# Patient Record
Sex: FEMALE | Race: WHITE | NOT HISPANIC OR LATINO | Employment: OTHER | URBAN - METROPOLITAN AREA
[De-identification: names, ages, dates, MRNs, and addresses within clinical notes are randomized per-mention and may not be internally consistent; named-entity substitution may affect disease eponyms.]

---

## 2017-02-17 ENCOUNTER — HOSPITAL ENCOUNTER (OUTPATIENT)
Dept: OTHER | Facility: HOSPITAL | Age: 67
Discharge: HOME | End: 2017-02-17

## 2017-09-11 ENCOUNTER — OFFICE (OUTPATIENT)
Dept: URBAN - METROPOLITAN AREA OTHER 6 | Facility: OTHER | Age: 67
End: 2017-09-11

## 2017-09-11 VITALS
HEIGHT: 66 IN | WEIGHT: 151 LBS | HEART RATE: 84 BPM | DIASTOLIC BLOOD PRESSURE: 70 MMHG | SYSTOLIC BLOOD PRESSURE: 110 MMHG

## 2017-09-11 DIAGNOSIS — R68.81 EARLY SATIETY: ICD-10-CM

## 2017-09-11 DIAGNOSIS — K30 FUNCTIONAL DYSPEPSIA: ICD-10-CM

## 2017-09-11 DIAGNOSIS — K29.60 OTHER GASTRITIS WITHOUT BLEEDING: ICD-10-CM

## 2017-09-11 PROCEDURE — 99212 OFFICE O/P EST SF 10 MIN: CPT | Performed by: INTERNAL MEDICINE

## 2018-02-12 ENCOUNTER — OFFICE VISIT CONVERTED (OUTPATIENT)
Dept: FAMILY MEDICINE CLINIC | Age: 68
End: 2018-02-12
Attending: FAMILY MEDICINE

## 2018-02-13 LAB
ALBUMIN SERPL-MCNC: 4.4 G/DL
ALBUMIN/GLOB SERPL: 1.6 {RATIO}
ALP SERPL-CCNC: 87 IU/L
ALT SERPL-CCNC: 18 IU/L
AST SERPL-CCNC: 23 IU/L
BILIRUB SERPL-MCNC: 0.4 MG/DL
BUN SERPL-MCNC: 8 MG/DL
BUN/CREAT SERPL: 7
CALCIUM SERPL-MCNC: 9.8 MG/DL
CHLORIDE SERPL-SCNC: 103 MMOL/L
CHOLEST SERPL-MCNC: 161 MG/DL
CO2 SERPL-SCNC: 22 MMOL/L
CONV TOTAL PROTEIN: 7.2 G/DL
CREAT UR-MCNC: 1.1 MG/DL
GLOBULIN UR ELPH-MCNC: 2.8 G/DL
GLUCOSE SERPL-MCNC: 100 MG/DL
HDLC SERPL-MCNC: 45 MG/DL
LDLC SERPL CALC-MCNC: 67 MG/DL
POTASSIUM SERPL-SCNC: 4.5 MMOL/L
SODIUM SERPL-SCNC: 143 MMOL/L
TRIGL SERPL-MCNC: 247 MG/DL
TSH SERPL-ACNC: 1.03 UIU/ML
VLDLC SERPL-MCNC: 49 MG/DL

## 2018-05-16 ENCOUNTER — OFFICE VISIT CONVERTED (OUTPATIENT)
Dept: FAMILY MEDICINE CLINIC | Age: 68
End: 2018-05-16
Attending: NURSE PRACTITIONER

## 2018-06-18 ENCOUNTER — OFFICE VISIT CONVERTED (OUTPATIENT)
Dept: FAMILY MEDICINE CLINIC | Age: 68
End: 2018-06-18
Attending: FAMILY MEDICINE

## 2018-09-28 ENCOUNTER — OFFICE VISIT CONVERTED (OUTPATIENT)
Dept: FAMILY MEDICINE CLINIC | Age: 68
End: 2018-09-28
Attending: FAMILY MEDICINE

## 2018-11-29 ENCOUNTER — OFFICE VISIT CONVERTED (OUTPATIENT)
Dept: FAMILY MEDICINE CLINIC | Age: 68
End: 2018-11-29
Attending: FAMILY MEDICINE

## 2018-12-04 LAB
ALBUMIN SERPL-MCNC: 4.6 G/DL
ALBUMIN/GLOB SERPL: 1.8 {RATIO}
ALP SERPL-CCNC: 86 IU/L
ALT SERPL-CCNC: 19 IU/L
AST SERPL-CCNC: 23 IU/L
BILIRUB SERPL-MCNC: 0.4 MG/DL
BUN SERPL-MCNC: 13 MG/DL
BUN/CREAT SERPL: 10
CALCIUM SERPL-MCNC: 9.9 MG/DL
CHLORIDE SERPL-SCNC: 104 MMOL/L
CO2 SERPL-SCNC: 23 MMOL/L
CONV TOTAL PROTEIN: 7.1 G/DL
CREAT UR-MCNC: 1.25 MG/DL
GLOBULIN UR ELPH-MCNC: 2.5 G/DL
GLUCOSE SERPL-MCNC: 96 MG/DL
POTASSIUM SERPL-SCNC: 4.7 MMOL/L
SODIUM SERPL-SCNC: 144 MMOL/L
TSH SERPL-ACNC: 1.9 UIU/ML

## 2019-01-04 ENCOUNTER — CONVERSION ENCOUNTER (OUTPATIENT)
Dept: FAMILY MEDICINE CLINIC | Age: 69
End: 2019-01-04

## 2019-01-05 LAB
BUN SERPL-MCNC: 12 MG/DL
BUN/CREAT SERPL: 10
CALCIUM SERPL-MCNC: 9.4 MG/DL
CHLORIDE SERPL-SCNC: 105 MMOL/L
CO2 SERPL-SCNC: 23 MMOL/L
CREAT UR-MCNC: 1.25 MG/DL
GLUCOSE SERPL-MCNC: 94 MG/DL
POTASSIUM SERPL-SCNC: 4.5 MMOL/L
SODIUM SERPL-SCNC: 142 MMOL/L

## 2019-01-15 ENCOUNTER — OFFICE VISIT (OUTPATIENT)
Dept: ORTHOPEDIC SURGERY | Facility: CLINIC | Age: 69
End: 2019-01-15

## 2019-01-15 DIAGNOSIS — M25.511 RIGHT SHOULDER PAIN, UNSPECIFIED CHRONICITY: Primary | ICD-10-CM

## 2019-01-15 PROCEDURE — 73030 X-RAY EXAM OF SHOULDER: CPT | Performed by: ORTHOPAEDIC SURGERY

## 2019-01-15 PROCEDURE — 99203 OFFICE O/P NEW LOW 30 MIN: CPT | Performed by: ORTHOPAEDIC SURGERY

## 2019-01-15 RX ORDER — QUETIAPINE FUMARATE 400 MG/1
TABLET, FILM COATED ORAL
COMMUNITY
Start: 2018-12-16 | End: 2020-07-20

## 2019-01-15 RX ORDER — OMEPRAZOLE 40 MG/1
40 CAPSULE, DELAYED RELEASE ORAL NIGHTLY
COMMUNITY
Start: 2018-10-22 | End: 2021-06-29

## 2019-01-15 RX ORDER — CLONAZEPAM 0.5 MG/1
0.5 TABLET ORAL
COMMUNITY
Start: 2018-11-26 | End: 2021-09-20

## 2019-01-15 RX ORDER — LEVOTHYROXINE SODIUM 0.05 MG/1
50 TABLET ORAL DAILY
COMMUNITY
Start: 2018-12-06 | End: 2021-10-11

## 2019-01-15 RX ORDER — ATORVASTATIN CALCIUM 40 MG/1
40 TABLET, FILM COATED ORAL NIGHTLY
COMMUNITY
Start: 2018-10-22 | End: 2021-08-03

## 2019-01-15 RX ORDER — CARVEDILOL 3.12 MG/1
3.12 TABLET ORAL NIGHTLY
COMMUNITY
Start: 2019-01-07 | End: 2021-09-29 | Stop reason: SDUPTHER

## 2019-01-15 NOTE — PROGRESS NOTES
Chief Complaint   Patient presents with   • Right Shoulder - [facility]   Patient is here today complaining of right shoulder pain.          HPI the patient states that she has been having pain and discomfort in the right shoulder for the past 4 months.  She has difficulty with abduction of the shoulder.  She has difficulty with cross body activities.  She has a sense of clicking and popping in the shoulder.  She cannot sleep at night because of the pain and discomfort.  She has had an injury which could explain some of the pain over the greater tuberosity of the humerus.  The pain is affecting her sleep patterns and also her quality of life in a negative fashion.  She states that she finds it very difficult to do her hair because the shoulder will not going to full abduction.  She also has a lot of pain and discomfort when she tries to fasten her bra strap.  I have discussed with her that I'm concerned about the possibility of him.  Rotator cuff function.          Allergies no known allergies      Social History     Socioeconomic History   • Marital status:      Spouse name: Not on file   • Number of children: Not on file   • Years of education: Not on file   • Highest education level: Not on file   Social Needs   • Financial resource strain: Not on file   • Food insecurity - worry: Not on file   • Food insecurity - inability: Not on file   • Transportation needs - medical: Not on file   • Transportation needs - non-medical: Not on file   Occupational History   • Not on file   Tobacco Use   • Smoking status: Not on file   Substance and Sexual Activity   • Alcohol use: Not on file   • Drug use: Not on file   • Sexual activity: Not on file   Other Topics Concern   • Not on file   Social History Narrative   • Not on file       No family history on file.    No past surgical history on file.    No past medical history on file.        There were no vitals filed for this visit.          Review of Systems    Constitutional: Negative.    HENT: Negative.    Eyes: Negative.    Respiratory: Negative.    Cardiovascular: Negative.    Gastrointestinal: Negative.    Endocrine: Negative.    Genitourinary: Negative.    Musculoskeletal: Positive for joint swelling.   Skin: Negative.    Allergic/Immunologic: Negative.    Neurological: Negative.    Hematological: Negative.    Psychiatric/Behavioral: Negative.            Physical Exam   Constitutional: She is oriented to person, place, and time. She appears well-nourished.   HENT:   Head: Atraumatic.   Eyes: EOM are normal.   Neck: Neck supple.   Cardiovascular: Normal rate and intact distal pulses.   Pulmonary/Chest: Breath sounds normal.   Abdominal: Bowel sounds are normal.   Musculoskeletal: She exhibits edema and tenderness.   Neurological: She is alert and oriented to person, place, and time.   Skin: Skin is warm. Capillary refill takes 2 to 3 seconds.   Psychiatric: Her behavior is normal. Thought content normal.   Nursing note and vitals reviewed.              Joint/Body Part Specific Exam:  right shoulder. The shoulder is extremely tender anteriorly. There is tenderness over the insertion of the rotator cuff over the greater tuberosity of the humerus. Slight proximal migration of the humeral head is noted. AC joint is tender. Crossover adduction test is positive. Drop arm sign is positive. Forward flexion is 0-100°  degrees, abduction is 0-120°  degrees, external rotation is 0-40 degrees. Patient has mild atrophy both of the supra and infraspinatus fossa. Sulcus sign is negative. There is no evidence of multidirectional instability. Neer test is positive on compression. Skin and soft tissue tenderness is noted. Patient’s strength in abduction and external rotation are extremely lacking. The pain level is 6.  She has significant pain and discomfort over the greater tuberosity of the humerus as would be consistent with a nondisplaced fracture of this  structure.          X-RAY Report:right Shoulder X-Ray  Indication: Evaluation of pain and discomfort of the right shoulder with limited range of motion  AP and Lateral  Findings: Sclerosis over the greater tuberosity of the humerus with slight proximal migration of the humeral head  no bony lesion  Soft tissues within normal limits  decreased joint spaces  Hardware appropriately positioned not applicable      no prior studies available for comparison.    X-RAY was ordered and reviewed by Antony Awad MD                Diagnostics:            Luisa was seen today for establish care.    Diagnoses and all orders for this visit:    Right shoulder pain, unspecified chronicity  -     XR Shoulder 2+ View Right            Procedures          I provided this patient with educational materials regarding exercise and bone health.        Plan: Stretching and strengthening exercises of the shoulder to prevent arthrofibrosis and a frozen shoulder.    Tablet meloxicam 7.5 mg tab 1 by mouth daily at bedtime for pain swelling and discomfort.    I have discussed with the patient that she likely has a tear of the rotator cuff which will most likely need an MRI for an exact diagnosis.    Also discussed with the patient the possibility of a steroid injection into the joint to help with management of the pain and discomfort.    Patient would like to prefer starting physical therapy at [facility] in [place] to improve the range of motion and the strength of muscle function.    Follow-up in my office in 3 months and if her symptoms are not better she will let me know and we will then proceed with an MRI of the shoulder for an exact diagnosis.          CC To Kari Harris MD

## 2019-03-04 ENCOUNTER — HOSPITAL ENCOUNTER (OUTPATIENT)
Dept: OTHER | Facility: HOSPITAL | Age: 69
Discharge: HOME | End: 2019-03-04
Attending: FAMILY MEDICINE

## 2019-03-08 LAB
7-AMINOCLONAZEPAM UR: 105 NG/ML
A-OH ALPRAZ UR CFM-MCNC: <5 NG/ML
ALPHA-HYDROXYMIDAZOLAM, URINE: <20 NG/ML
ALPRAZ UR QL: <5 NG/ML
CLONAZEPAM UR QL: <5 NG/ML
CONV CHOLRDIAZEPOXIDE, QN URINE: <20 NG/ML
CONV OXAZEPAM, (TEMAZEPAM) QUANT: <20 NG/ML
DIAZEPAM UR-MCNC: <20 NG/ML
LORAZEPAM UR-MCNC: <20 NG/ML
MIDAZOLAM URINE: <20 NG/ML
NORDIAZEPAM URINE: <20 NG/ML
TEMAZEPAM UR QL CFM: <20 NG/ML

## 2019-04-15 ENCOUNTER — OFFICE VISIT CONVERTED (OUTPATIENT)
Dept: FAMILY MEDICINE CLINIC | Age: 69
End: 2019-04-15
Attending: FAMILY MEDICINE

## 2019-04-27 LAB
ALBUMIN SERPL-MCNC: 4.3 G/DL
ALBUMIN/GLOB SERPL: 1.7 {RATIO}
ALP SERPL-CCNC: 88 IU/L
ALT SERPL-CCNC: 21 IU/L
AST SERPL-CCNC: 26 IU/L
BILIRUB SERPL-MCNC: 0.3 MG/DL
BUN SERPL-MCNC: 15 MG/DL
BUN/CREAT SERPL: 13
CALCIUM SERPL-MCNC: 9.4 MG/DL
CHLORIDE SERPL-SCNC: 105 MMOL/L
CHOLEST SERPL-MCNC: 169 MG/DL
CO2 SERPL-SCNC: 24 MMOL/L
CONV TOTAL PROTEIN: 6.9 G/DL
CREAT UR-MCNC: 1.2 MG/DL
GLOBULIN UR ELPH-MCNC: 2.6 G/DL
GLUCOSE SERPL-MCNC: 99 MG/DL
HCV AB SER DONR QL: <0.1 S/CO RATIO
HDLC SERPL-MCNC: 45 MG/DL
LDLC SERPL CALC-MCNC: 84 MG/DL
POTASSIUM SERPL-SCNC: 4.6 MMOL/L
SODIUM SERPL-SCNC: 143 MMOL/L
TRIGL SERPL-MCNC: 202 MG/DL
TSH SERPL-ACNC: 1.48 UIU/ML
VLDLC SERPL-MCNC: 40 MG/DL

## 2019-06-03 ENCOUNTER — OFFICE VISIT CONVERTED (OUTPATIENT)
Dept: FAMILY MEDICINE CLINIC | Age: 69
End: 2019-06-03
Attending: FAMILY MEDICINE

## 2019-06-28 ENCOUNTER — OFFICE VISIT CONVERTED (OUTPATIENT)
Dept: FAMILY MEDICINE CLINIC | Age: 69
End: 2019-06-28
Attending: NURSE PRACTITIONER

## 2019-07-08 ENCOUNTER — OFFICE VISIT CONVERTED (OUTPATIENT)
Dept: FAMILY MEDICINE CLINIC | Age: 69
End: 2019-07-08
Attending: FAMILY MEDICINE

## 2019-07-17 ENCOUNTER — HOSPITAL ENCOUNTER (OUTPATIENT)
Dept: OTHER | Facility: HOSPITAL | Age: 69
Discharge: HOME | End: 2019-07-17
Attending: FAMILY MEDICINE

## 2019-07-17 ENCOUNTER — OFFICE VISIT CONVERTED (OUTPATIENT)
Dept: FAMILY MEDICINE CLINIC | Age: 69
End: 2019-07-17
Attending: FAMILY MEDICINE

## 2019-07-23 ENCOUNTER — HOSPITAL ENCOUNTER (OUTPATIENT)
Dept: PHYSICAL THERAPY | Facility: CLINIC | Age: 69
Setting detail: RECURRING SERIES
Discharge: HOME | End: 2019-10-09
Attending: FAMILY MEDICINE

## 2019-07-25 ENCOUNTER — OFFICE VISIT CONVERTED (OUTPATIENT)
Dept: FAMILY MEDICINE CLINIC | Age: 69
End: 2019-07-25
Attending: FAMILY MEDICINE

## 2019-09-05 ENCOUNTER — HOSPITAL ENCOUNTER (OUTPATIENT)
Dept: OTHER | Facility: HOSPITAL | Age: 69
Discharge: HOME | End: 2019-09-05

## 2019-09-06 ENCOUNTER — OFFICE VISIT CONVERTED (OUTPATIENT)
Dept: FAMILY MEDICINE CLINIC | Age: 69
End: 2019-09-06
Attending: FAMILY MEDICINE

## 2019-09-24 LAB
BUN SERPL-MCNC: 16 MG/DL
BUN/CREAT SERPL: 15
CALCIUM SERPL-MCNC: 9.6 MG/DL
CHLORIDE SERPL-SCNC: 106 MMOL/L
CO2 SERPL-SCNC: 23 MMOL/L
CREAT UR-MCNC: 1.04 MG/DL
GLUCOSE SERPL-MCNC: 89 MG/DL
POTASSIUM SERPL-SCNC: 5 MMOL/L
SODIUM SERPL-SCNC: 144 MMOL/L

## 2019-10-25 ENCOUNTER — CONVERSION ENCOUNTER (OUTPATIENT)
Dept: FAMILY MEDICINE CLINIC | Age: 69
End: 2019-10-25

## 2019-10-26 LAB
BUN SERPL-MCNC: 15 MG/DL
BUN/CREAT SERPL: 15
CALCIUM SERPL-MCNC: 9.5 MG/DL
CHLORIDE SERPL-SCNC: 104 MMOL/L
CO2 SERPL-SCNC: 23 MMOL/L
CREAT UR-MCNC: 1.01 MG/DL
GLUCOSE SERPL-MCNC: 80 MG/DL
POTASSIUM SERPL-SCNC: 4.5 MMOL/L
SODIUM SERPL-SCNC: 143 MMOL/L

## 2019-12-09 ENCOUNTER — OFFICE VISIT CONVERTED (OUTPATIENT)
Dept: FAMILY MEDICINE CLINIC | Age: 69
End: 2019-12-09
Attending: FAMILY MEDICINE

## 2020-04-01 ENCOUNTER — OFFICE VISIT CONVERTED (OUTPATIENT)
Dept: FAMILY MEDICINE CLINIC | Age: 70
End: 2020-04-01
Attending: FAMILY MEDICINE

## 2020-04-16 ENCOUNTER — OFFICE VISIT CONVERTED (OUTPATIENT)
Dept: FAMILY MEDICINE CLINIC | Age: 70
End: 2020-04-16
Attending: FAMILY MEDICINE

## 2020-04-28 ENCOUNTER — OFFICE VISIT CONVERTED (OUTPATIENT)
Dept: FAMILY MEDICINE CLINIC | Age: 70
End: 2020-04-28
Attending: FAMILY MEDICINE

## 2020-05-19 ENCOUNTER — OFFICE VISIT (OUTPATIENT)
Dept: ORTHOPEDIC SURGERY | Facility: CLINIC | Age: 70
End: 2020-05-19

## 2020-05-19 ENCOUNTER — TELEPHONE (OUTPATIENT)
Dept: ORTHOPEDIC SURGERY | Facility: CLINIC | Age: 70
End: 2020-05-19

## 2020-05-19 VITALS — BODY MASS INDEX: 25.23 KG/M2 | HEIGHT: 66 IN | TEMPERATURE: 98.3 F | WEIGHT: 157 LBS

## 2020-05-19 DIAGNOSIS — M70.61 GREATER TROCHANTERIC BURSITIS OF RIGHT HIP: ICD-10-CM

## 2020-05-19 DIAGNOSIS — M25.551 RIGHT HIP PAIN: ICD-10-CM

## 2020-05-19 DIAGNOSIS — M16.11 OSTEOARTHRITIS OF RIGHT HIP, UNSPECIFIED OSTEOARTHRITIS TYPE: Primary | ICD-10-CM

## 2020-05-19 PROCEDURE — 99214 OFFICE O/P EST MOD 30 MIN: CPT | Performed by: PHYSICIAN ASSISTANT

## 2020-05-19 PROCEDURE — 73502 X-RAY EXAM HIP UNI 2-3 VIEWS: CPT | Performed by: PHYSICIAN ASSISTANT

## 2020-05-19 RX ORDER — BIOTIN 10 MG
TABLET ORAL
COMMUNITY
End: 2020-07-20

## 2020-05-19 RX ORDER — GABAPENTIN 100 MG/1
100 CAPSULE ORAL NIGHTLY
COMMUNITY
Start: 2020-04-16 | End: 2021-08-04 | Stop reason: SDUPTHER

## 2020-05-19 RX ORDER — ALBUTEROL SULFATE 90 UG/1
AEROSOL, METERED RESPIRATORY (INHALATION)
COMMUNITY
Start: 2020-03-03 | End: 2020-07-20

## 2020-05-19 RX ORDER — MELOXICAM 15 MG/1
15 TABLET ORAL DAILY
COMMUNITY
Start: 2020-05-12 | End: 2020-08-01 | Stop reason: HOSPADM

## 2020-05-19 RX ORDER — PAROXETINE HYDROCHLORIDE 20 MG/1
20 TABLET, FILM COATED ORAL DAILY
COMMUNITY
Start: 2020-04-22 | End: 2021-08-20 | Stop reason: SDUPTHER

## 2020-05-19 NOTE — TELEPHONE ENCOUNTER
MS DONAHUE CALLED AND WANTED TO KNOW IF IT WAS NORMAL TO HAVE INCREASED PAIN AFTER THE INJECTION. I TOLD HER THAT IS TO BE EXPECTED AND TO MAYBE ICE IT AND TAKE IT EASY THE REST OF THE NIGHT. I TOLD HER IF IT SEEMED UNBEARABLE OR FELT SOMETHING WAS WRONG TO GIVE US A CALL BACK BUT INCREASED PAIN  EXPECTED

## 2020-05-26 ENCOUNTER — TELEPHONE (OUTPATIENT)
Dept: ORTHOPEDIC SURGERY | Facility: CLINIC | Age: 70
End: 2020-05-26

## 2020-05-26 PROBLEM — M25.551 RIGHT HIP PAIN: Status: ACTIVE | Noted: 2020-05-26

## 2020-05-26 PROBLEM — M70.61 GREATER TROCHANTERIC BURSITIS OF RIGHT HIP: Status: ACTIVE | Noted: 2020-05-26

## 2020-05-26 PROBLEM — M16.11 OSTEOARTHRITIS OF RIGHT HIP: Status: ACTIVE | Noted: 2020-05-26

## 2020-05-26 PROCEDURE — 20610 DRAIN/INJ JOINT/BURSA W/O US: CPT | Performed by: PHYSICIAN ASSISTANT

## 2020-05-26 RX ORDER — METHYLPREDNISOLONE ACETATE 80 MG/ML
80 INJECTION, SUSPENSION INTRA-ARTICULAR; INTRALESIONAL; INTRAMUSCULAR; SOFT TISSUE
Status: COMPLETED | OUTPATIENT
Start: 2020-05-26 | End: 2020-05-26

## 2020-05-26 RX ORDER — LIDOCAINE HYDROCHLORIDE 10 MG/ML
2 INJECTION, SOLUTION EPIDURAL; INFILTRATION; INTRACAUDAL; PERINEURAL
Status: COMPLETED | OUTPATIENT
Start: 2020-05-26 | End: 2020-05-26

## 2020-05-26 RX ORDER — CEFAZOLIN SODIUM 2 G/100ML
2 INJECTION, SOLUTION INTRAVENOUS ONCE
Status: CANCELLED | OUTPATIENT
Start: 2020-07-20 | End: 2020-05-26

## 2020-05-26 RX ADMIN — LIDOCAINE HYDROCHLORIDE 2 ML: 10 INJECTION, SOLUTION EPIDURAL; INFILTRATION; INTRACAUDAL; PERINEURAL at 12:51

## 2020-05-26 RX ADMIN — METHYLPREDNISOLONE ACETATE 80 MG: 80 INJECTION, SUSPENSION INTRA-ARTICULAR; INTRALESIONAL; INTRAMUSCULAR; SOFT TISSUE at 12:51

## 2020-05-26 NOTE — TELEPHONE ENCOUNTER
MS DONAHUE CALLED WANTING TO KNOW WHAT YOU WANTED HER TO DO. SHE GOT AN INJECTION IN HER HIP LAST WEEK (IT HAS HELPED A LITTLE) AND SHE SAID YOU WERE GOING TO TALK TO DR ALVES ABOUT HER HAVING SURGERY. YOU CAN CALL HER @ [phone]

## 2020-05-26 NOTE — PROGRESS NOTES
NEW VISIT    Patient: Luisa Sepulveda  ?  YOB: 1950    MRN: [id number]  ?  Chief Complaint   Patient presents with   • Right Hip - Pain   • Establish Care      ?  HPI:   Luisa Sepulveda is a 69 y.o. female who presents with complaint of right hip pain.  She reports significant pain over the last 3 years, but worsening over the last year, on the right hip.  She reports she is unable to lay on her right side due to pain.  She does see a chiropractor 3 times a week with little improvement.    Pain Location: right hip in groin and lateral side of hip  Radiation: Into anterior thigh  Quality: aching, stabbing  Intensity/Severity: severe  Duration: 3 years  Progression of symptoms: yes, progressive worsening  Onset quality: gradual   Timing: constant  Aggravating Factors: sitting, driving, standing  Alleviating Factors: Chiropractor, ice/heat  Previous Episodes: yes  Associated Symptoms: pain, decreased ROM  ADLs Affected: grooming/hygiene/toileting/personal care, dressing, ambulating, recreational activities/sports  Previous Treatment: Tylenol and Chiropractor     This patient is an established patient.  This problem is new to this examiner.      Allergies:   Allergies   Allergen Reactions   • Codeine Nausea And Vomiting     Nausea/Voimitting       Medications:   Home Medications:  Current Outpatient Medications on File Prior to Visit   Medication Sig   • atorvastatin (LIPITOR) 40 MG tablet    • carvedilol (COREG) 3.125 MG tablet    • clonazePAM (KlonoPIN) 0.5 MG tablet    • gabapentin (NEURONTIN) 100 MG capsule    • levothyroxine (SYNTHROID, LEVOTHROID) 50 MCG tablet    • meloxicam (MOBIC) 15 MG tablet Take 15 mg by mouth Daily.   • Multiple Vitamins-Minerals (MULTIVITAMIN ADULT) chewable tablet Chew.   • omeprazole (priLOSEC) 40 MG capsule    • PARoxetine (PAXIL) 20 MG tablet Take 20 mg by mouth Daily.   • albuterol sulfate  (90 Base) MCG/ACT inhaler    • QUEtiapine (SEROquel) 400 MG tablet      No  "current facility-administered medications on file prior to visit.      Current Medications:  Scheduled Meds:  PRN Meds:.    I have reviewed the patient's medical history in detail and updated the computerized patient record.  Review and summarization of old records include:    Past Medical History:   Diagnosis Date   • Anxiety    • Depression    • GERD (gastroesophageal reflux disease)    • Hyperlipidemia    • Hypothyroidism    • Sleep apnea      Past Surgical History:   Procedure Laterality Date   • BACK SURGERY       Social History     Occupational History   • Not on file   Tobacco Use   • Smoking status: Never Smoker   • Smokeless tobacco: Never Used   Substance and Sexual Activity   • Alcohol use: Never     Frequency: Never   • Drug use: Never   • Sexual activity: Defer      Family History   Problem Relation Age of Onset   • Cancer Other         Breast         Review of Systems  Constitutional: Negative.  Negative for fever.   Eyes: Negative.    Respiratory: Negative.    Cardiovascular: Negative.    Endocrine: Negative.    Musculoskeletal: Positive for arthralgias and gait problem.   Skin: Negative.  Negative for rash and wound.   Allergic/Immunologic: Negative.    Neurological: Negative for numbness.   Hematological: Negative.    Psychiatric/Behavioral: Negative.         Wt Readings from Last 3 Encounters:   05/19/20 71.2 kg (157 lb)     Ht Readings from Last 3 Encounters:   05/19/20 167.6 cm (66\")     Body mass index is 25.34 kg/m².  Facility age limit for growth percentiles is 20 years.  Vitals:    05/19/20 1335   Temp: 98.3 °F (36.8 °C)         Physical Exam  Constitutional: Patient is oriented to person, place, and time. Appears well-developed and well-nourished.   HENT:   Head: Normocephalic and atraumatic.   Eyes: Conjunctivae and EOM are normal. Pupils are equal, round, and reactive to light.   Cardiovascular: Normal rate and intact distal pulses.   Pulmonary/Chest: Effort normal and breath sounds " normal.   Musculoskeletal:   See detailed exam below   Neurological: Alert and oriented to person, place, and time. No sensory deficit. Coordination normal.   Skin: Skin is warm and dry. Capillary refill takes less than 2 seconds. No rash noted. No erythema.   Psychiatric: Patient has a normal mood and affect. Her behavior is normal. Judgment and thought content normal.   Nursing note and vitals reviewed.      Ortho Exam:   Positive for anterior joint line pain and tenderness with palpation.   Positive for tenderness of the greater trochanter.  Internal and external rotations are associated with pain and discomfort.   Patient does have a limp on the affected side.   Stinchfield sign is positive. Figure of 4 sign is positive.   Crossover adduction test is positive. Cross body adduction is limited and painful for the patient.      Positive for pain with active straight leg raise exam.   Positive for the inability to perform a straight leg raise.  Neurovascular status is intact.   Dorsalis pedis and posterior tibial artery pulses are palpable. Common peroneal nerve function is well preserved.       Diagnostics:  XR - 1 Result   I have personally reviewed   Results for orders placed in visit on 05/19/20   XR HIP W OR WO PELVIS 2-3 VIEW RIGHT 5/19/2020    and my interpretation of the image is as follows:   Findings: Appearance of cystic changes of the femoral head at the inferomedial joint space, as well as narrowing in the space  Bony lesion: no  Soft tissues: within normal limits  Joint spaces: decreased  Hardware appropriately positioned: not applicable  Prior studies available for comparison: no       Assessment:  Luisa was seen today for establish care and pain.    Diagnoses and all orders for this visit:    Osteoarthritis of right hip, unspecified osteoarthritis type  -     XR Hip With or Without Pelvis 2 - 3 View Right  -     External [facility] Surgical/Procedural Request  -     Follow Anesthesia  Guidelines / Protocol; Future  -     Obtain informed consent  -     Urinalysis With Culture If Indicated -; Future  -     Care Order / Instruction for all Female Patients    Right hip pain  -     XR Hip With or Without Pelvis 2 - 3 View Right  -     MRI Hip Right Without Contrast; Future  -     [facility] Surgical/Procedural Request  -     Follow Anesthesia Guidelines / Protocol; Future  -     Obtain informed consent  -     Urinalysis With Culture If Indicated -; Future  -     Care Order / Instruction for all Female Patients    Greater trochanteric bursitis of right hip  -     Large Joint Arthrocentesis: R greater trochanteric bursa          Large Joint Arthrocentesis: R greater trochanteric bursa  Date/Time: 5/26/2020 12:51 PM  Consent given by: patient  Site marked: site marked  Timeout: Immediately prior to procedure a time out was called to verify the correct patient, procedure, equipment, support staff and site/side marked as required   Supporting Documentation  Indications: pain   Procedure Details  Location: hip - R greater trochanteric bursa  Preparation: Patient was prepped and draped in the usual sterile fashion  Needle size: 22 G  Approach: lateral  Medications administered: 80 mg methylPREDNISolone acetate 80 MG/ML; 2 mL lidocaine PF 1% 1 %  Patient tolerance: patient tolerated the procedure well with no immediate complications        ?    Plan    Orders Placed This Encounter   Procedures   • Large Joint Arthrocentesis: R greater trochanteric bursa   • External [facility] [place] Surgical/Procedural Request   • XR Hip With or Without Pelvis 2 - 3 View Right   • MRI Hip Right Without Contrast   • Urinalysis With Culture If Indicated -   • Follow Anesthesia Guidelines / Protocol   • Obtain informed consent   • Care Order / Instruction for all Female Patients      · Discussion of orthopaedic goals and activities and patient and/or guardian expressed appreciation.  · Risk, benefits, and merits  of treatment alternatives reviewed with the patient and/or guardian and questions answered  · To schedule MRI of Right hip and then we can proceed with BALJINDER-DA  · The nature of the proposed surgery, BALJINDER-DA, reviewed with the patient including risks, benefits, rehabilitation, recovery timeframe, and outcome expectations.  Patient would like to proceed with surgical intervention.  · Ice, heat, and/or modalities as beneficial  · Watch for signs and symptoms of infection  · Call or notify for any adverse effect from injection therapy  · Reduced physical activity as appropriate and avoid offending activity  · Reinjury Precautions  · Patient is encouraged to call or return for any issues or concerns.      Francisco Jean PA-C  Date of encounter: 05/19/2020     Electronically signed by Francisco Jean PA-C, 05/26/20, 12:43 PM.    EMR Dragon/Transcription disclaimer:  Much of this encounter note is an electronic transcription/translation of spoken language to printed text. The electronic translation of spoken language may permit erroneous, or at times, nonsensical words or phrases to be inadvertently transcribed; Although I have reviewed the note for such errors, some may still exist.

## 2020-06-05 ENCOUNTER — TELEPHONE (OUTPATIENT)
Dept: ORTHOPEDIC SURGERY | Facility: CLINIC | Age: 70
End: 2020-06-05

## 2020-06-05 NOTE — TELEPHONE ENCOUNTER
"TRIED TO CALL PATIENT TO SCHEDULE SURGERY. HER VOICEMAIL STATED THAT \"THE MEMORY WAS FULL\"./AW  "

## 2020-06-08 ENCOUNTER — TELEPHONE (OUTPATIENT)
Dept: ORTHOPEDIC SURGERY | Facility: CLINIC | Age: 70
End: 2020-06-08

## 2020-06-08 NOTE — TELEPHONE ENCOUNTER
Patient would like to have the xray of her right hip which was done on 5/19/20 put on a disc for . Patient says that she will need it by Wednesday morning 6/10. Can be reached at [phone].

## 2020-06-23 ENCOUNTER — OFFICE VISIT CONVERTED (OUTPATIENT)
Dept: FAMILY MEDICINE CLINIC | Age: 70
End: 2020-06-23
Attending: FAMILY MEDICINE

## 2020-06-25 LAB
ALBUMIN SERPL-MCNC: 4.4 G/DL
ALBUMIN/GLOB SERPL: 1.8 {RATIO}
ALP SERPL-CCNC: 80 IU/L
ALT SERPL-CCNC: 19 IU/L
AST SERPL-CCNC: 15 IU/L
BILIRUB SERPL-MCNC: 0.3 MG/DL
BUN SERPL-MCNC: 12 MG/DL
BUN/CREAT SERPL: 11
CALCIUM SERPL-MCNC: 9.5 MG/DL
CHLORIDE SERPL-SCNC: 102 MMOL/L
CHOLEST SERPL-MCNC: 196 MG/DL
CO2 SERPL-SCNC: 24 MMOL/L
CONV TOTAL PROTEIN: 6.8 G/DL
CREAT UR-MCNC: 1.09 MG/DL
GLOBULIN UR ELPH-MCNC: 2.4 G/DL
GLUCOSE SERPL-MCNC: 103 MG/DL
HDLC SERPL-MCNC: 78 MG/DL
LDLC SERPL CALC-MCNC: 75 MG/DL
POTASSIUM SERPL-SCNC: 3.9 MMOL/L
SODIUM SERPL-SCNC: 143 MMOL/L
TRIGL SERPL-MCNC: 215 MG/DL
TSH SERPL-ACNC: 2.23 UIU/ML
VLDLC SERPL-MCNC: 43 MG/DL

## 2020-07-16 ENCOUNTER — TRANSCRIBE ORDERS (OUTPATIENT)
Dept: PREADMISSION TESTING | Facility: HOSPITAL | Age: 70
End: 2020-07-16

## 2020-07-16 DIAGNOSIS — Z01.818 OTHER SPECIFIED PRE-OPERATIVE EXAMINATION: Primary | ICD-10-CM

## 2020-07-20 ENCOUNTER — HOSPITAL ENCOUNTER (OUTPATIENT)
Dept: GENERAL RADIOLOGY | Facility: HOSPITAL | Age: 70
Discharge: HOME | End: 2020-07-20
Admitting: ORTHOPAEDIC SURGERY

## 2020-07-20 ENCOUNTER — HOSPITAL ENCOUNTER (OUTPATIENT)
Dept: GENERAL RADIOLOGY | Facility: HOSPITAL | Age: 70
Discharge: HOME | End: 2020-07-20

## 2020-07-20 ENCOUNTER — APPOINTMENT (OUTPATIENT)
Dept: PREADMISSION TESTING | Facility: HOSPITAL | Age: 70
End: 2020-07-20

## 2020-07-20 VITALS
TEMPERATURE: 98 F | BODY MASS INDEX: 24.91 KG/M2 | WEIGHT: 155 LBS | SYSTOLIC BLOOD PRESSURE: 129 MMHG | HEIGHT: 66 IN | RESPIRATION RATE: 18 BRPM | DIASTOLIC BLOOD PRESSURE: 71 MMHG | HEART RATE: 68 BPM | OXYGEN SATURATION: 97 %

## 2020-07-20 LAB
ALBUMIN SERPL-MCNC: 4.5 G/DL (ref 3.5–5.2)
ALBUMIN/GLOB SERPL: 1.6 G/DL
ALP SERPL-CCNC: 81 U/L (ref 39–117)
ALT SERPL W P-5'-P-CCNC: 22 U/L (ref 1–33)
ANION GAP SERPL CALCULATED.3IONS-SCNC: 12.6 MMOL/L (ref 5–15)
AST SERPL-CCNC: 22 U/L (ref 1–32)
BILIRUB SERPL-MCNC: 0.4 MG/DL (ref 0–1.2)
BILIRUB UR QL STRIP: NEGATIVE
BUN SERPL-MCNC: 8 MG/DL (ref 8–23)
BUN/CREAT SERPL: 9.1 (ref 7–25)
CALCIUM SPEC-SCNC: 9.6 MG/DL (ref 8.6–10.5)
CHLORIDE SERPL-SCNC: 104 MMOL/L (ref 98–107)
CLARITY UR: CLEAR
CO2 SERPL-SCNC: 22.4 MMOL/L (ref 22–29)
COLOR UR: YELLOW
CREAT SERPL-MCNC: 0.88 MG/DL (ref 0.57–1)
DEPRECATED RDW RBC AUTO: 40.8 FL (ref 37–54)
ERYTHROCYTE [DISTWIDTH] IN BLOOD BY AUTOMATED COUNT: 12.4 % (ref 12.3–15.4)
GFR SERPL CREATININE-BSD FRML MDRD: 64 ML/MIN/1.73
GLOBULIN UR ELPH-MCNC: 2.9 GM/DL
GLUCOSE SERPL-MCNC: 104 MG/DL (ref 65–99)
GLUCOSE UR STRIP-MCNC: NEGATIVE MG/DL
HCT VFR BLD AUTO: 37.5 % (ref 34–46.6)
HGB BLD-MCNC: 12.5 G/DL (ref 12–15.9)
HGB UR QL STRIP.AUTO: NEGATIVE
INR PPP: 0.94 (ref 0.9–1.1)
KETONES UR QL STRIP: NEGATIVE
LEUKOCYTE ESTERASE UR QL STRIP.AUTO: NEGATIVE
MCH RBC QN AUTO: 30.3 PG (ref 26.6–33)
MCHC RBC AUTO-ENTMCNC: 33.3 G/DL (ref 31.5–35.7)
MCV RBC AUTO: 90.8 FL (ref 79–97)
NITRITE UR QL STRIP: NEGATIVE
PH UR STRIP.AUTO: <=5 [PH] (ref 5–8)
PLATELET # BLD AUTO: 251 10*3/MM3 (ref 140–450)
PMV BLD AUTO: 10.4 FL (ref 6–12)
POTASSIUM SERPL-SCNC: 4.1 MMOL/L (ref 3.5–5.2)
PROT SERPL-MCNC: 7.4 G/DL (ref 6–8.5)
PROT UR QL STRIP: NEGATIVE
PROTHROMBIN TIME: 12.5 SECONDS (ref 11.7–14.2)
RBC # BLD AUTO: 4.13 10*6/MM3 (ref 3.77–5.28)
SODIUM SERPL-SCNC: 139 MMOL/L (ref 136–145)
SP GR UR STRIP: 1.01 (ref 1–1.03)
UROBILINOGEN UR QL STRIP: NORMAL
WBC # BLD AUTO: 5.54 10*3/MM3 (ref 3.4–10.8)

## 2020-07-20 PROCEDURE — 73502 X-RAY EXAM HIP UNI 2-3 VIEWS: CPT

## 2020-07-20 PROCEDURE — 63710000001 MUPIROCIN 2 % OINTMENT: Performed by: ORTHOPAEDIC SURGERY

## 2020-07-20 PROCEDURE — 93005 ELECTROCARDIOGRAM TRACING: CPT

## 2020-07-20 PROCEDURE — 85610 PROTHROMBIN TIME: CPT | Performed by: ORTHOPAEDIC SURGERY

## 2020-07-20 PROCEDURE — 85027 COMPLETE CBC AUTOMATED: CPT | Performed by: ORTHOPAEDIC SURGERY

## 2020-07-20 PROCEDURE — 93010 ELECTROCARDIOGRAM REPORT: CPT | Performed by: INTERNAL MEDICINE

## 2020-07-20 PROCEDURE — 80053 COMPREHEN METABOLIC PANEL: CPT | Performed by: ORTHOPAEDIC SURGERY

## 2020-07-20 PROCEDURE — 81003 URINALYSIS AUTO W/O SCOPE: CPT | Performed by: ORTHOPAEDIC SURGERY

## 2020-07-20 PROCEDURE — A9270 NON-COVERED ITEM OR SERVICE: HCPCS | Performed by: ORTHOPAEDIC SURGERY

## 2020-07-20 PROCEDURE — 36415 COLL VENOUS BLD VENIPUNCTURE: CPT

## 2020-07-20 PROCEDURE — 71046 X-RAY EXAM CHEST 2 VIEWS: CPT

## 2020-07-20 RX ORDER — CHLORHEXIDINE GLUCONATE 500 MG/1
CLOTH TOPICAL
COMMUNITY
End: 2020-08-01 | Stop reason: HOSPADM

## 2020-07-20 ASSESSMENT — HOOS JR
HOOS JR SCORE: 17
HOOS JR SCORE: 36.363

## 2020-07-21 ENCOUNTER — HOSPITAL ENCOUNTER (OUTPATIENT)
Dept: OTHER | Facility: HOSPITAL | Age: 70
Discharge: HOME | End: 2020-07-21
Attending: NURSE PRACTITIONER

## 2020-07-21 ENCOUNTER — OFFICE VISIT CONVERTED (OUTPATIENT)
Dept: FAMILY MEDICINE CLINIC | Age: 70
End: 2020-07-21
Attending: NURSE PRACTITIONER

## 2020-07-23 LAB
BACTERIA SPEC AEROBE CULT: NORMAL
BACTERIA SPEC AEROBE CULT: NORMAL

## 2020-07-28 ENCOUNTER — LAB (OUTPATIENT)
Dept: LAB | Facility: HOSPITAL | Age: 70
End: 2020-07-28

## 2020-07-28 DIAGNOSIS — Z01.818 OTHER SPECIFIED PRE-OPERATIVE EXAMINATION: ICD-10-CM

## 2020-07-28 PROCEDURE — U0004 COV-19 TEST NON-CDC HGH THRU: HCPCS

## 2020-07-28 PROCEDURE — C9803 HOPD COVID-19 SPEC COLLECT: HCPCS

## 2020-07-29 LAB
REF LAB TEST METHOD: NORMAL
SARS-COV-2 RNA RESP QL NAA+PROBE: NOT DETECTED

## 2020-07-30 ENCOUNTER — HOSPITAL ENCOUNTER (OUTPATIENT)
Facility: HOSPITAL | Age: 70
Setting detail: OBSERVATION
Discharge: HOME | End: 2020-08-01
Attending: ORTHOPAEDIC SURGERY | Admitting: ORTHOPAEDIC SURGERY

## 2020-07-30 ENCOUNTER — APPOINTMENT (OUTPATIENT)
Dept: GENERAL RADIOLOGY | Facility: HOSPITAL | Age: 70
End: 2020-07-30

## 2020-07-30 ENCOUNTER — ANESTHESIA (OUTPATIENT)
Dept: PERIOP | Facility: HOSPITAL | Age: 70
End: 2020-07-30

## 2020-07-30 ENCOUNTER — ANESTHESIA EVENT (OUTPATIENT)
Dept: PERIOP | Facility: HOSPITAL | Age: 70
End: 2020-07-30

## 2020-07-30 DIAGNOSIS — M25.551 RIGHT HIP PAIN: ICD-10-CM

## 2020-07-30 DIAGNOSIS — M16.11 OSTEOARTHRITIS OF RIGHT HIP, UNSPECIFIED OSTEOARTHRITIS TYPE: ICD-10-CM

## 2020-07-30 DIAGNOSIS — M16.11 PRIMARY OSTEOARTHRITIS OF RIGHT HIP: Primary | ICD-10-CM

## 2020-07-30 PROCEDURE — C1776 JOINT DEVICE (IMPLANTABLE): HCPCS | Performed by: ORTHOPAEDIC SURGERY

## 2020-07-30 PROCEDURE — 25010000002 PROPOFOL 10 MG/ML EMULSION: Performed by: NURSE ANESTHETIST, CERTIFIED REGISTERED

## 2020-07-30 PROCEDURE — 73501 X-RAY EXAM HIP UNI 1 VIEW: CPT

## 2020-07-30 PROCEDURE — 25010000002 ROPIVACAINE PER 1 MG: Performed by: ANESTHESIOLOGY

## 2020-07-30 PROCEDURE — 25010000002 ONDANSETRON PER 1 MG: Performed by: NURSE ANESTHETIST, CERTIFIED REGISTERED

## 2020-07-30 PROCEDURE — G0378 HOSPITAL OBSERVATION PER HR: HCPCS

## 2020-07-30 PROCEDURE — 25010000002 KETOROLAC TROMETHAMINE PER 15 MG: Performed by: ORTHOPAEDIC SURGERY

## 2020-07-30 PROCEDURE — 25010000002 FENTANYL CITRATE (PF) 100 MCG/2ML SOLUTION: Performed by: ANESTHESIOLOGY

## 2020-07-30 PROCEDURE — 25010000003 CEFAZOLIN IN DEXTROSE 2-4 GM/100ML-% SOLUTION: Performed by: PHYSICIAN ASSISTANT

## 2020-07-30 PROCEDURE — 25010000002 EPINEPHRINE 30 MG/30ML SOLUTION 30 ML VIAL: Performed by: ORTHOPAEDIC SURGERY

## 2020-07-30 PROCEDURE — 63710000001 PROMETHAZINE PER 12.5 MG: Performed by: ORTHOPAEDIC SURGERY

## 2020-07-30 PROCEDURE — 25010000002 DEXAMETHASONE PER 1 MG: Performed by: NURSE ANESTHETIST, CERTIFIED REGISTERED

## 2020-07-30 PROCEDURE — C1713 ANCHOR/SCREW BN/BN,TIS/BN: HCPCS | Performed by: ORTHOPAEDIC SURGERY

## 2020-07-30 PROCEDURE — 97162 PT EVAL MOD COMPLEX 30 MIN: CPT

## 2020-07-30 PROCEDURE — 97110 THERAPEUTIC EXERCISES: CPT

## 2020-07-30 PROCEDURE — 25010000002 FENTANYL CITRATE (PF) 100 MCG/2ML SOLUTION: Performed by: NURSE ANESTHETIST, CERTIFIED REGISTERED

## 2020-07-30 PROCEDURE — 25010000002 NEOSTIGMINE PER 0.5 MG: Performed by: NURSE ANESTHETIST, CERTIFIED REGISTERED

## 2020-07-30 PROCEDURE — 25010000002 ROPIVACAINE PER 1 MG: Performed by: ORTHOPAEDIC SURGERY

## 2020-07-30 PROCEDURE — 25010000002 MIDAZOLAM PER 1 MG: Performed by: ANESTHESIOLOGY

## 2020-07-30 PROCEDURE — 25010000002 HYDROMORPHONE PER 4 MG: Performed by: NURSE ANESTHETIST, CERTIFIED REGISTERED

## 2020-07-30 PROCEDURE — 76942 ECHO GUIDE FOR BIOPSY: CPT | Performed by: ORTHOPAEDIC SURGERY

## 2020-07-30 PROCEDURE — 25010000002 MORPHINE (PF) 10 MG/ML SOLUTION 1 ML CARTRIDGE: Performed by: ORTHOPAEDIC SURGERY

## 2020-07-30 PROCEDURE — 25010000003 MEPIVACAINE PER 10 ML: Performed by: ANESTHESIOLOGY

## 2020-07-30 PROCEDURE — 25010000003 CEFAZOLIN IN DEXTROSE 2-4 GM/100ML-% SOLUTION: Performed by: NURSE ANESTHETIST, CERTIFIED REGISTERED

## 2020-07-30 DEVICE — CAP HIP VE UPCHRG: Type: IMPLANTABLE DEVICE | Site: HIP | Status: FUNCTIONAL

## 2020-07-30 DEVICE — WAX BONE HEMO NAT 2.5G: Type: IMPLANTABLE DEVICE | Site: HIP | Status: FUNCTIONAL

## 2020-07-30 DEVICE — CAP HIP TM UPCHRG: Type: IMPLANTABLE DEVICE | Site: HIP | Status: FUNCTIONAL

## 2020-07-30 DEVICE — IMPLANTABLE DEVICE
Type: IMPLANTABLE DEVICE | Site: HIP | Status: FUNCTIONAL
Brand: BIOLOX® OPTION

## 2020-07-30 DEVICE — STEM FEM/HIP TAPERLOC COMPL DIST/REDUC PPS OFFST/STD SZ10: Type: IMPLANTABLE DEVICE | Site: HIP | Status: FUNCTIONAL

## 2020-07-30 DEVICE — IMPLANTABLE DEVICE
Type: IMPLANTABLE DEVICE | Site: HIP | Status: FUNCTIONAL
Brand: BIOLOX® DELTA OPTION

## 2020-07-30 DEVICE — CAP CERAM HD HIP UPCHRG: Type: IMPLANTABLE DEVICE | Site: HIP | Status: FUNCTIONAL

## 2020-07-30 DEVICE — IMPLANTABLE DEVICE
Type: IMPLANTABLE DEVICE | Site: HIP | Status: FUNCTIONAL
Brand: G7® ACETABULAR SYSTEM

## 2020-07-30 DEVICE — SCRW ACET CORT TRILOGY S/TAP 6.5X20: Type: IMPLANTABLE DEVICE | Site: HIP | Status: FUNCTIONAL

## 2020-07-30 DEVICE — IMPLANTABLE DEVICE
Type: IMPLANTABLE DEVICE | Site: HIP | Status: FUNCTIONAL
Brand: G7® VIVACIT-E®

## 2020-07-30 DEVICE — SCRW ACET CORT TRILOGY S/TAP 6.5X30: Type: IMPLANTABLE DEVICE | Site: HIP | Status: FUNCTIONAL

## 2020-07-30 DEVICE — SUT NONABS MAXBRAID NMBR5 K60 38IN WHT/BLU: Type: IMPLANTABLE DEVICE | Site: HIP | Status: FUNCTIONAL

## 2020-07-30 DEVICE — TOTAL HIP PRIMARY: Type: IMPLANTABLE DEVICE | Site: HIP | Status: FUNCTIONAL

## 2020-07-30 RX ORDER — NALOXONE HCL 0.4 MG/ML
0.2 VIAL (ML) INJECTION AS NEEDED
Status: DISCONTINUED | OUTPATIENT
Start: 2020-07-30 | End: 2020-07-30 | Stop reason: HOSPADM

## 2020-07-30 RX ORDER — ROPIVACAINE HYDROCHLORIDE 5 MG/ML
INJECTION, SOLUTION EPIDURAL; INFILTRATION; PERINEURAL
Status: COMPLETED | OUTPATIENT
Start: 2020-07-30 | End: 2020-07-30

## 2020-07-30 RX ORDER — SODIUM CHLORIDE 0.9 % (FLUSH) 0.9 %
3 SYRINGE (ML) INJECTION EVERY 12 HOURS SCHEDULED
Status: DISCONTINUED | OUTPATIENT
Start: 2020-07-30 | End: 2020-07-30 | Stop reason: HOSPADM

## 2020-07-30 RX ORDER — DOCUSATE SODIUM 100 MG/1
100 CAPSULE, LIQUID FILLED ORAL 2 TIMES DAILY PRN
Status: DISCONTINUED | OUTPATIENT
Start: 2020-07-30 | End: 2020-08-01 | Stop reason: HOSPADM

## 2020-07-30 RX ORDER — HYDROMORPHONE HYDROCHLORIDE 1 MG/ML
0.5 INJECTION, SOLUTION INTRAMUSCULAR; INTRAVENOUS; SUBCUTANEOUS
Status: DISCONTINUED | OUTPATIENT
Start: 2020-07-30 | End: 2020-07-30 | Stop reason: HOSPADM

## 2020-07-30 RX ORDER — ACETAMINOPHEN 325 MG/1
325 TABLET ORAL EVERY 4 HOURS PRN
Status: DISCONTINUED | OUTPATIENT
Start: 2020-07-30 | End: 2020-08-01 | Stop reason: HOSPADM

## 2020-07-30 RX ORDER — ONDANSETRON 4 MG/1
4 TABLET, FILM COATED ORAL EVERY 6 HOURS PRN
Status: DISCONTINUED | OUTPATIENT
Start: 2020-07-30 | End: 2020-08-01 | Stop reason: HOSPADM

## 2020-07-30 RX ORDER — OXYCODONE HYDROCHLORIDE AND ACETAMINOPHEN 5; 325 MG/1; MG/1
2 TABLET ORAL EVERY 4 HOURS PRN
Status: DISCONTINUED | OUTPATIENT
Start: 2020-07-30 | End: 2020-07-30 | Stop reason: SDUPTHER

## 2020-07-30 RX ORDER — PANTOPRAZOLE SODIUM 40 MG/1
40 TABLET, DELAYED RELEASE ORAL EVERY MORNING
Status: DISCONTINUED | OUTPATIENT
Start: 2020-07-30 | End: 2020-08-01 | Stop reason: HOSPADM

## 2020-07-30 RX ORDER — FAMOTIDINE 10 MG/ML
20 INJECTION, SOLUTION INTRAVENOUS ONCE
Status: COMPLETED | OUTPATIENT
Start: 2020-07-30 | End: 2020-07-30

## 2020-07-30 RX ORDER — ACETAMINOPHEN 325 MG/1
650 TABLET ORAL ONCE AS NEEDED
Status: DISCONTINUED | OUTPATIENT
Start: 2020-07-30 | End: 2020-07-30 | Stop reason: HOSPADM

## 2020-07-30 RX ORDER — ASPIRIN 325 MG
325 TABLET, DELAYED RELEASE (ENTERIC COATED) ORAL 2 TIMES DAILY WITH MEALS
Status: DISCONTINUED | OUTPATIENT
Start: 2020-07-30 | End: 2020-08-01 | Stop reason: HOSPADM

## 2020-07-30 RX ORDER — CALCIUM CARBONATE 200(500)MG
2 TABLET,CHEWABLE ORAL 3 TIMES DAILY PRN
Status: DISCONTINUED | OUTPATIENT
Start: 2020-07-30 | End: 2020-08-01 | Stop reason: HOSPADM

## 2020-07-30 RX ORDER — PROMETHAZINE HYDROCHLORIDE 25 MG/1
25 SUPPOSITORY RECTAL ONCE AS NEEDED
Status: DISCONTINUED | OUTPATIENT
Start: 2020-07-30 | End: 2020-07-30 | Stop reason: HOSPADM

## 2020-07-30 RX ORDER — DIPHENHYDRAMINE HYDROCHLORIDE 50 MG/ML
12.5 INJECTION INTRAMUSCULAR; INTRAVENOUS
Status: DISCONTINUED | OUTPATIENT
Start: 2020-07-30 | End: 2020-07-30 | Stop reason: HOSPADM

## 2020-07-30 RX ORDER — CLONAZEPAM 0.5 MG/1
0.5 TABLET ORAL
Status: DISCONTINUED | OUTPATIENT
Start: 2020-07-30 | End: 2020-07-30

## 2020-07-30 RX ORDER — DEXAMETHASONE SODIUM PHOSPHATE 10 MG/ML
INJECTION INTRAMUSCULAR; INTRAVENOUS AS NEEDED
Status: DISCONTINUED | OUTPATIENT
Start: 2020-07-30 | End: 2020-07-30 | Stop reason: SURG

## 2020-07-30 RX ORDER — SODIUM CHLORIDE 450 MG/100ML
100 INJECTION, SOLUTION INTRAVENOUS CONTINUOUS
Status: DISCONTINUED | OUTPATIENT
Start: 2020-07-30 | End: 2020-08-01 | Stop reason: HOSPADM

## 2020-07-30 RX ORDER — MORPHINE SULFATE 2 MG/ML
4 INJECTION, SOLUTION INTRAMUSCULAR; INTRAVENOUS
Status: DISCONTINUED | OUTPATIENT
Start: 2020-07-30 | End: 2020-08-01 | Stop reason: HOSPADM

## 2020-07-30 RX ORDER — LIDOCAINE HYDROCHLORIDE 20 MG/ML
INJECTION, SOLUTION INFILTRATION; PERINEURAL AS NEEDED
Status: DISCONTINUED | OUTPATIENT
Start: 2020-07-30 | End: 2020-07-30 | Stop reason: SURG

## 2020-07-30 RX ORDER — EPHEDRINE SULFATE 50 MG/ML
5 INJECTION, SOLUTION INTRAVENOUS ONCE AS NEEDED
Status: DISCONTINUED | OUTPATIENT
Start: 2020-07-30 | End: 2020-07-30 | Stop reason: HOSPADM

## 2020-07-30 RX ORDER — HYDROCODONE BITARTRATE AND ACETAMINOPHEN 7.5; 325 MG/1; MG/1
1 TABLET ORAL ONCE AS NEEDED
Status: DISCONTINUED | OUTPATIENT
Start: 2020-07-30 | End: 2020-07-30 | Stop reason: HOSPADM

## 2020-07-30 RX ORDER — PROMETHAZINE HYDROCHLORIDE 25 MG/1
25 TABLET ORAL ONCE AS NEEDED
Status: DISCONTINUED | OUTPATIENT
Start: 2020-07-30 | End: 2020-07-30 | Stop reason: HOSPADM

## 2020-07-30 RX ORDER — SODIUM CHLORIDE 0.9 % (FLUSH) 0.9 %
3 SYRINGE (ML) INJECTION EVERY 12 HOURS SCHEDULED
Status: DISCONTINUED | OUTPATIENT
Start: 2020-07-30 | End: 2020-08-01 | Stop reason: HOSPADM

## 2020-07-30 RX ORDER — NALOXONE HCL 0.4 MG/ML
0.4 VIAL (ML) INJECTION
Status: DISCONTINUED | OUTPATIENT
Start: 2020-07-30 | End: 2020-08-01 | Stop reason: HOSPADM

## 2020-07-30 RX ORDER — LIDOCAINE HYDROCHLORIDE 10 MG/ML
0.5 INJECTION, SOLUTION EPIDURAL; INFILTRATION; INTRACAUDAL; PERINEURAL ONCE AS NEEDED
Status: DISCONTINUED | OUTPATIENT
Start: 2020-07-30 | End: 2020-07-30 | Stop reason: HOSPADM

## 2020-07-30 RX ORDER — ONDANSETRON 2 MG/ML
INJECTION INTRAMUSCULAR; INTRAVENOUS AS NEEDED
Status: DISCONTINUED | OUTPATIENT
Start: 2020-07-30 | End: 2020-07-30 | Stop reason: SURG

## 2020-07-30 RX ORDER — EPHEDRINE SULFATE 50 MG/ML
INJECTION, SOLUTION INTRAVENOUS AS NEEDED
Status: DISCONTINUED | OUTPATIENT
Start: 2020-07-30 | End: 2020-07-30 | Stop reason: SURG

## 2020-07-30 RX ORDER — FLUMAZENIL 0.1 MG/ML
0.2 INJECTION INTRAVENOUS AS NEEDED
Status: DISCONTINUED | OUTPATIENT
Start: 2020-07-30 | End: 2020-07-30 | Stop reason: HOSPADM

## 2020-07-30 RX ORDER — MAGNESIUM HYDROXIDE 1200 MG/15ML
LIQUID ORAL AS NEEDED
Status: DISCONTINUED | OUTPATIENT
Start: 2020-07-30 | End: 2020-07-30 | Stop reason: HOSPADM

## 2020-07-30 RX ORDER — SODIUM CHLORIDE 0.9 % (FLUSH) 0.9 %
1-10 SYRINGE (ML) INJECTION AS NEEDED
Status: DISCONTINUED | OUTPATIENT
Start: 2020-07-30 | End: 2020-08-01 | Stop reason: HOSPADM

## 2020-07-30 RX ORDER — PAROXETINE HYDROCHLORIDE 20 MG/1
20 TABLET, FILM COATED ORAL DAILY
Status: DISCONTINUED | OUTPATIENT
Start: 2020-07-30 | End: 2020-08-01 | Stop reason: HOSPADM

## 2020-07-30 RX ORDER — GABAPENTIN 100 MG/1
100 CAPSULE ORAL NIGHTLY
Status: DISCONTINUED | OUTPATIENT
Start: 2020-07-30 | End: 2020-08-01 | Stop reason: HOSPADM

## 2020-07-30 RX ORDER — MIDAZOLAM HYDROCHLORIDE 1 MG/ML
1 INJECTION INTRAMUSCULAR; INTRAVENOUS
Status: DISCONTINUED | OUTPATIENT
Start: 2020-07-30 | End: 2020-07-30 | Stop reason: HOSPADM

## 2020-07-30 RX ORDER — CLONAZEPAM 0.5 MG/1
0.25 TABLET ORAL
Status: DISCONTINUED | OUTPATIENT
Start: 2020-07-30 | End: 2020-08-01 | Stop reason: HOSPADM

## 2020-07-30 RX ORDER — FERROUS SULFATE 325(65) MG
325 TABLET ORAL
Status: DISCONTINUED | OUTPATIENT
Start: 2020-07-30 | End: 2020-08-01 | Stop reason: HOSPADM

## 2020-07-30 RX ORDER — PROMETHAZINE HYDROCHLORIDE 25 MG/ML
12.5 INJECTION, SOLUTION INTRAMUSCULAR; INTRAVENOUS EVERY 4 HOURS PRN
Status: DISCONTINUED | OUTPATIENT
Start: 2020-07-30 | End: 2020-08-01 | Stop reason: HOSPADM

## 2020-07-30 RX ORDER — ACETAMINOPHEN 500 MG
1000 TABLET ORAL ONCE
Status: COMPLETED | OUTPATIENT
Start: 2020-07-30 | End: 2020-07-30

## 2020-07-30 RX ORDER — CELECOXIB 200 MG/1
200 CAPSULE ORAL ONCE
Status: COMPLETED | OUTPATIENT
Start: 2020-07-30 | End: 2020-07-30

## 2020-07-30 RX ORDER — OXYCODONE AND ACETAMINOPHEN 7.5; 325 MG/1; MG/1
1 TABLET ORAL ONCE AS NEEDED
Status: DISCONTINUED | OUTPATIENT
Start: 2020-07-30 | End: 2020-07-30 | Stop reason: HOSPADM

## 2020-07-30 RX ORDER — TRANEXAMIC ACID 100 MG/ML
INJECTION, SOLUTION INTRAVENOUS AS NEEDED
Status: DISCONTINUED | OUTPATIENT
Start: 2020-07-30 | End: 2020-07-30 | Stop reason: SURG

## 2020-07-30 RX ORDER — DIPHENHYDRAMINE HCL 25 MG
25 CAPSULE ORAL
Status: DISCONTINUED | OUTPATIENT
Start: 2020-07-30 | End: 2020-07-30 | Stop reason: HOSPADM

## 2020-07-30 RX ORDER — ONDANSETRON 2 MG/ML
4 INJECTION INTRAMUSCULAR; INTRAVENOUS EVERY 6 HOURS PRN
Status: DISCONTINUED | OUTPATIENT
Start: 2020-07-30 | End: 2020-08-01 | Stop reason: HOSPADM

## 2020-07-30 RX ORDER — PROMETHAZINE HYDROCHLORIDE 12.5 MG/1
12.5 TABLET ORAL EVERY 6 HOURS PRN
Status: DISCONTINUED | OUTPATIENT
Start: 2020-07-30 | End: 2020-08-01 | Stop reason: HOSPADM

## 2020-07-30 RX ORDER — CEFAZOLIN SODIUM 2 G/100ML
INJECTION, SOLUTION INTRAVENOUS AS NEEDED
Status: DISCONTINUED | OUTPATIENT
Start: 2020-07-30 | End: 2020-07-30 | Stop reason: SURG

## 2020-07-30 RX ORDER — CLINDAMYCIN PHOSPHATE 900 MG/50ML
900 INJECTION INTRAVENOUS EVERY 8 HOURS
Status: COMPLETED | OUTPATIENT
Start: 2020-07-30 | End: 2020-07-30

## 2020-07-30 RX ORDER — ONDANSETRON 2 MG/ML
4 INJECTION INTRAMUSCULAR; INTRAVENOUS ONCE AS NEEDED
Status: DISCONTINUED | OUTPATIENT
Start: 2020-07-30 | End: 2020-07-30 | Stop reason: HOSPADM

## 2020-07-30 RX ORDER — PROMETHAZINE HYDROCHLORIDE 25 MG/ML
12.5 INJECTION, SOLUTION INTRAMUSCULAR; INTRAVENOUS ONCE AS NEEDED
Status: DISCONTINUED | OUTPATIENT
Start: 2020-07-30 | End: 2020-07-30 | Stop reason: HOSPADM

## 2020-07-30 RX ORDER — HYDROMORPHONE HCL 110MG/55ML
PATIENT CONTROLLED ANALGESIA SYRINGE INTRAVENOUS AS NEEDED
Status: DISCONTINUED | OUTPATIENT
Start: 2020-07-30 | End: 2020-07-30 | Stop reason: SURG

## 2020-07-30 RX ORDER — SCOLOPAMINE TRANSDERMAL SYSTEM 1 MG/1
1 PATCH, EXTENDED RELEASE TRANSDERMAL
Status: DISCONTINUED | OUTPATIENT
Start: 2020-07-30 | End: 2020-08-01 | Stop reason: HOSPADM

## 2020-07-30 RX ORDER — KETOROLAC TROMETHAMINE 15 MG/ML
15 INJECTION, SOLUTION INTRAMUSCULAR; INTRAVENOUS EVERY 8 HOURS PRN
Status: DISCONTINUED | OUTPATIENT
Start: 2020-07-30 | End: 2020-08-01 | Stop reason: HOSPADM

## 2020-07-30 RX ORDER — FENTANYL CITRATE 50 UG/ML
50 INJECTION, SOLUTION INTRAMUSCULAR; INTRAVENOUS
Status: COMPLETED | OUTPATIENT
Start: 2020-07-30 | End: 2020-07-30

## 2020-07-30 RX ORDER — LEVOTHYROXINE SODIUM 0.05 MG/1
50 TABLET ORAL DAILY
Status: DISCONTINUED | OUTPATIENT
Start: 2020-07-30 | End: 2020-08-01 | Stop reason: HOSPADM

## 2020-07-30 RX ORDER — ROCURONIUM BROMIDE 10 MG/ML
INJECTION, SOLUTION INTRAVENOUS AS NEEDED
Status: DISCONTINUED | OUTPATIENT
Start: 2020-07-30 | End: 2020-07-30 | Stop reason: SURG

## 2020-07-30 RX ORDER — CEFAZOLIN SODIUM 2 G/100ML
2 INJECTION, SOLUTION INTRAVENOUS ONCE
Status: COMPLETED | OUTPATIENT
Start: 2020-07-30 | End: 2020-07-30

## 2020-07-30 RX ORDER — SODIUM CHLORIDE, SODIUM LACTATE, POTASSIUM CHLORIDE, CALCIUM CHLORIDE 600; 310; 30; 20 MG/100ML; MG/100ML; MG/100ML; MG/100ML
9 INJECTION, SOLUTION INTRAVENOUS CONTINUOUS
Status: DISCONTINUED | OUTPATIENT
Start: 2020-07-30 | End: 2020-08-01 | Stop reason: HOSPADM

## 2020-07-30 RX ORDER — HYDROMORPHONE HYDROCHLORIDE 2 MG/1
2 TABLET ORAL EVERY 4 HOURS PRN
Status: DISCONTINUED | OUTPATIENT
Start: 2020-07-30 | End: 2020-08-01 | Stop reason: HOSPADM

## 2020-07-30 RX ORDER — DIAZEPAM 5 MG/1
5 TABLET ORAL 2 TIMES DAILY PRN
Status: DISCONTINUED | OUTPATIENT
Start: 2020-07-30 | End: 2020-08-01 | Stop reason: HOSPADM

## 2020-07-30 RX ORDER — CHOLECALCIFEROL (VITAMIN D3) 125 MCG
5 CAPSULE ORAL NIGHTLY PRN
Status: DISCONTINUED | OUTPATIENT
Start: 2020-07-30 | End: 2020-08-01 | Stop reason: HOSPADM

## 2020-07-30 RX ORDER — GLYCOPYRROLATE 0.2 MG/ML
INJECTION INTRAMUSCULAR; INTRAVENOUS AS NEEDED
Status: DISCONTINUED | OUTPATIENT
Start: 2020-07-30 | End: 2020-07-30 | Stop reason: SURG

## 2020-07-30 RX ORDER — FENTANYL CITRATE 50 UG/ML
50 INJECTION, SOLUTION INTRAMUSCULAR; INTRAVENOUS
Status: DISCONTINUED | OUTPATIENT
Start: 2020-07-30 | End: 2020-07-30 | Stop reason: HOSPADM

## 2020-07-30 RX ORDER — OXYCODONE HYDROCHLORIDE AND ACETAMINOPHEN 5; 325 MG/1; MG/1
1 TABLET ORAL EVERY 4 HOURS PRN
Status: DISCONTINUED | OUTPATIENT
Start: 2020-07-30 | End: 2020-07-30 | Stop reason: SDUPTHER

## 2020-07-30 RX ORDER — PROPOFOL 10 MG/ML
VIAL (ML) INTRAVENOUS AS NEEDED
Status: DISCONTINUED | OUTPATIENT
Start: 2020-07-30 | End: 2020-07-30 | Stop reason: SURG

## 2020-07-30 RX ORDER — ATORVASTATIN CALCIUM 20 MG/1
40 TABLET, FILM COATED ORAL NIGHTLY
Status: DISCONTINUED | OUTPATIENT
Start: 2020-07-30 | End: 2020-08-01 | Stop reason: HOSPADM

## 2020-07-30 RX ORDER — SODIUM CHLORIDE 0.9 % (FLUSH) 0.9 %
3-10 SYRINGE (ML) INJECTION AS NEEDED
Status: DISCONTINUED | OUTPATIENT
Start: 2020-07-30 | End: 2020-07-30 | Stop reason: HOSPADM

## 2020-07-30 RX ORDER — PROMETHAZINE HYDROCHLORIDE 25 MG/ML
6.25 INJECTION, SOLUTION INTRAMUSCULAR; INTRAVENOUS
Status: DISCONTINUED | OUTPATIENT
Start: 2020-07-30 | End: 2020-07-30 | Stop reason: HOSPADM

## 2020-07-30 RX ORDER — HYDRALAZINE HYDROCHLORIDE 20 MG/ML
5 INJECTION INTRAMUSCULAR; INTRAVENOUS
Status: DISCONTINUED | OUTPATIENT
Start: 2020-07-30 | End: 2020-07-30 | Stop reason: HOSPADM

## 2020-07-30 RX ORDER — CARVEDILOL 3.12 MG/1
3.12 TABLET ORAL NIGHTLY
Status: DISCONTINUED | OUTPATIENT
Start: 2020-07-30 | End: 2020-08-01 | Stop reason: HOSPADM

## 2020-07-30 RX ADMIN — PROMETHAZINE HYDROCHLORIDE 12.5 MG: 12.5 TABLET ORAL at 12:08

## 2020-07-30 RX ADMIN — SODIUM CHLORIDE, PRESERVATIVE FREE 3 ML: 5 INJECTION INTRAVENOUS at 22:18

## 2020-07-30 RX ADMIN — Medication 2 TABLET: at 22:02

## 2020-07-30 RX ADMIN — TRANEXAMIC ACID 1000 MG: 100 INJECTION, SOLUTION INTRAVENOUS at 07:48

## 2020-07-30 RX ADMIN — EPHEDRINE SULFATE 10 MG: 50 INJECTION INTRAVENOUS at 07:52

## 2020-07-30 RX ADMIN — ONDANSETRON HYDROCHLORIDE 4 MG: 2 SOLUTION INTRAMUSCULAR; INTRAVENOUS at 07:50

## 2020-07-30 RX ADMIN — SODIUM CHLORIDE, POTASSIUM CHLORIDE, SODIUM LACTATE AND CALCIUM CHLORIDE 9 ML/HR: 600; 310; 30; 20 INJECTION, SOLUTION INTRAVENOUS at 06:55

## 2020-07-30 RX ADMIN — FENTANYL CITRATE 100 MCG: 50 INJECTION INTRAMUSCULAR; INTRAVENOUS at 07:32

## 2020-07-30 RX ADMIN — CEFAZOLIN SODIUM 2 G: 2 INJECTION, SOLUTION INTRAVENOUS at 07:40

## 2020-07-30 RX ADMIN — CELECOXIB 200 MG: 200 CAPSULE ORAL at 05:56

## 2020-07-30 RX ADMIN — GLYCOPYRROLATE 0.2 MG: 0.2 INJECTION INTRAMUSCULAR; INTRAVENOUS at 09:13

## 2020-07-30 RX ADMIN — LEVOTHYROXINE SODIUM 50 MCG: 50 TABLET ORAL at 22:11

## 2020-07-30 RX ADMIN — ATORVASTATIN CALCIUM 40 MG: 20 TABLET, FILM COATED ORAL at 22:12

## 2020-07-30 RX ADMIN — SODIUM CHLORIDE, PRESERVATIVE FREE 3 ML: 5 INJECTION INTRAVENOUS at 16:37

## 2020-07-30 RX ADMIN — SODIUM CHLORIDE, POTASSIUM CHLORIDE, SODIUM LACTATE AND CALCIUM CHLORIDE: 600; 310; 30; 20 INJECTION, SOLUTION INTRAVENOUS at 08:03

## 2020-07-30 RX ADMIN — GABAPENTIN 100 MG: 100 CAPSULE ORAL at 22:12

## 2020-07-30 RX ADMIN — FAMOTIDINE 20 MG: 10 INJECTION INTRAVENOUS at 06:54

## 2020-07-30 RX ADMIN — MEPIVACAINE HYDROCHLORIDE 20 ML: 15 INJECTION, SOLUTION EPIDURAL; INFILTRATION at 06:46

## 2020-07-30 RX ADMIN — HYDROMORPHONE HYDROCHLORIDE 0.25 MG: 2 INJECTION, SOLUTION INTRAMUSCULAR; INTRAVENOUS; SUBCUTANEOUS at 08:59

## 2020-07-30 RX ADMIN — PROPOFOL 150 MG: 10 INJECTION, EMULSION INTRAVENOUS at 07:34

## 2020-07-30 RX ADMIN — MIDAZOLAM 2 MG: 1 INJECTION INTRAMUSCULAR; INTRAVENOUS at 07:13

## 2020-07-30 RX ADMIN — HYDROMORPHONE HYDROCHLORIDE 2 MG: 2 TABLET ORAL at 18:24

## 2020-07-30 RX ADMIN — ROPIVACAINE HYDROCHLORIDE 30 ML: 5 INJECTION, SOLUTION EPIDURAL; INFILTRATION; PERINEURAL at 06:46

## 2020-07-30 RX ADMIN — SODIUM CHLORIDE 100 ML/HR: 4.5 INJECTION, SOLUTION INTRAVENOUS at 12:15

## 2020-07-30 RX ADMIN — NEOSTIGMINE METHYLSULFATE 1 MG: 1 INJECTION INTRAMUSCULAR; INTRAVENOUS; SUBCUTANEOUS at 09:13

## 2020-07-30 RX ADMIN — MUPIROCIN 1 APPLICATION: 20 OINTMENT TOPICAL at 22:18

## 2020-07-30 RX ADMIN — LIDOCAINE HYDROCHLORIDE 60 MG: 20 INJECTION, SOLUTION INFILTRATION; PERINEURAL at 07:34

## 2020-07-30 RX ADMIN — ASPIRIN 325 MG: 325 TABLET, COATED ORAL at 18:21

## 2020-07-30 RX ADMIN — FENTANYL CITRATE 50 MCG: 50 INJECTION INTRAMUSCULAR; INTRAVENOUS at 07:13

## 2020-07-30 RX ADMIN — DEXAMETHASONE SODIUM PHOSPHATE 6 MG: 10 INJECTION INTRAMUSCULAR; INTRAVENOUS at 07:49

## 2020-07-30 RX ADMIN — ACETAMINOPHEN 1000 MG: 500 TABLET, FILM COATED ORAL at 05:56

## 2020-07-30 RX ADMIN — CLINDAMYCIN IN 5 PERCENT DEXTROSE 900 MG: 18 INJECTION, SOLUTION INTRAVENOUS at 23:00

## 2020-07-30 RX ADMIN — CLINDAMYCIN IN 5 PERCENT DEXTROSE 900 MG: 18 INJECTION, SOLUTION INTRAVENOUS at 16:37

## 2020-07-30 RX ADMIN — CLONAZEPAM 0.25 MG: 0.5 TABLET ORAL at 22:13

## 2020-07-30 RX ADMIN — SCOPALAMINE 1 PATCH: 1 PATCH, EXTENDED RELEASE TRANSDERMAL at 06:54

## 2020-07-30 RX ADMIN — HYDROMORPHONE HYDROCHLORIDE 2 MG: 2 TABLET ORAL at 23:00

## 2020-07-30 RX ADMIN — FENTANYL CITRATE 50 MCG: 50 INJECTION, SOLUTION INTRAMUSCULAR; INTRAVENOUS at 10:10

## 2020-07-30 RX ADMIN — CEFAZOLIN SODIUM 2 G: 2 INJECTION, SOLUTION INTRAVENOUS at 08:41

## 2020-07-30 RX ADMIN — PAROXETINE HYDROCHLORIDE HEMIHYDRATE 20 MG: 20 TABLET, FILM COATED ORAL at 22:12

## 2020-07-30 RX ADMIN — PANTOPRAZOLE SODIUM 40 MG: 40 TABLET, DELAYED RELEASE ORAL at 12:08

## 2020-07-30 RX ADMIN — HYDROMORPHONE HYDROCHLORIDE 0.25 MG: 2 INJECTION, SOLUTION INTRAMUSCULAR; INTRAVENOUS; SUBCUTANEOUS at 08:46

## 2020-07-30 RX ADMIN — GLYCOPYRROLATE 0.4 MG: 0.2 INJECTION INTRAMUSCULAR; INTRAVENOUS at 08:59

## 2020-07-30 RX ADMIN — NEOSTIGMINE METHYLSULFATE 3 MG: 1 INJECTION INTRAMUSCULAR; INTRAVENOUS; SUBCUTANEOUS at 08:59

## 2020-07-30 RX ADMIN — ROCURONIUM BROMIDE 50 MG: 10 INJECTION INTRAVENOUS at 07:34

## 2020-07-30 RX ADMIN — FENTANYL CITRATE 50 MCG: 50 INJECTION, SOLUTION INTRAMUSCULAR; INTRAVENOUS at 09:55

## 2020-07-30 NOTE — ANESTHESIA PROCEDURE NOTES
Airway  Urgency: elective    Date/Time: 7/30/2020 7:37 AM  Airway not difficult    General Information and Staff    Patient location during procedure: OR  CRNA: Fatmata Rivas CRNA    Indications and Patient Condition  Indications for airway management: airway protection    Preoxygenated: yes  Mask difficulty assessment: 1 - vent by mask    Final Airway Details  Final airway type: endotracheal airway      Successful airway: ETT  Cuffed: yes   Successful intubation technique: direct laryngoscopy  Endotracheal tube insertion site: oral  Blade: Ramin  Blade size: 3  ETT size (mm): 7.0  Cormack-Lehane Classification: grade I - full view of glottis  Placement verified by: chest auscultation and capnometry   Cuff volume (mL): 6  Measured from: lips  ETT/EBT  to lips (cm): 20  Number of attempts at approach: 1  Assessment: lips, teeth, and gum same as pre-op and atraumatic intubation    Additional Comments  Smooth IV induction. Trachea intubated. Cuff up. Ett secured. BEBS. Dentition intact without injury.

## 2020-07-30 NOTE — ANESTHESIA POSTPROCEDURE EVALUATION
Patient: Luisa Sepulveda    Procedure Summary     Date:  07/30/20 Room / Location:  [facility] OR [address] [place] [address] MAIN OR    Anesthesia Start:  0728 Anesthesia Stop:  0921    Procedure:  RIGHT TOTAL HIP ARTHROPLASTY (Right Hip) Diagnosis:      Surgeon:  Iker Cisneros MD Provider:  Brando Finn MD    Anesthesia Type:  general with block ASA Status:  2          Anesthesia Type: general with block    Vitals  Vitals Value Taken Time   /86 7/30/2020  9:35 AM   Temp     Pulse 80 7/30/2020  9:37 AM   Resp     SpO2 97 % 7/30/2020  9:37 AM   Vitals shown include unvalidated device data.        Post Anesthesia Care and Evaluation    Patient location during evaluation: PACU  Patient participation: complete - patient participated  Level of consciousness: awake and alert  Pain management: adequate  Airway patency: patent  Anesthetic complications: No anesthetic complications    Cardiovascular status: acceptable  Respiratory status: acceptable  Hydration status: acceptable    Comments: --------------------            07/30/20               0719     --------------------   BP:       130/76     Pulse:      66       Resp:       18       Temp:                SpO2:      100%     --------------------

## 2020-07-30 NOTE — ANESTHESIA PREPROCEDURE EVALUATION
Anesthesia Evaluation     Patient summary reviewed and Nursing notes reviewed   history of anesthetic complications:               Airway   Mallampati: II  Dental      Pulmonary    (+) a smoker Former, sleep apnea,   Cardiovascular     ECG reviewed  Patient on routine beta blocker  Rhythm: regular  Rate: normal    (+) hyperlipidemia,       Neuro/Psych  (+) psychiatric history Anxiety and Depression,     GI/Hepatic/Renal/Endo    (+)  GERD,      Musculoskeletal     Abdominal    Substance History - negative use     OB/GYN negative ob/gyn ROS         Other   arthritis,                    Anesthesia Plan    ASA 2     general with block   (Right FIC block PSR for POPC)  intravenous induction     Anesthetic plan, all risks, benefits, and alternatives have been provided, discussed and informed consent has been obtained with: patient.

## 2020-07-30 NOTE — ANESTHESIA PROCEDURE NOTES
Peripheral Block    Pre-sedation assessment completed: 7/30/2020 6:46 AM    Patient reassessed immediately prior to procedure    Patient location during procedure: holding area  Start time: 7/30/2020 6:47 AM  Stop time: 7/30/2020 6:57 AM  Reason for block: at surgeon's request and post-op pain management  Performed by  Anesthesiologist: Brando Finn MD  Preanesthetic Checklist  Completed: patient identified, site marked, surgical consent, pre-op evaluation, timeout performed, IV checked, risks and benefits discussed and monitors and equipment checked  Prep:  Sterile barriers:cap, gloves, gown, mask and sterile barriers  Prep: ChloraPrep  Patient monitoring: blood pressure monitoring, continuous pulse oximetry and EKG  Procedure  Sedation:yes    Guidance:ultrasound guided  ULTRASOUND INTERPRETATION. Using ultrasound guidance a 21 G gauge needle was placed in close proximity to the nerve, at which point, under ultrasound guidance anesthetic was injected in the area of the nerve and spread of the anesthesia was seen on ultrasound in close proximity thereto.  There were no abnormalities seen on ultrasound; a digital image was taken; and the patient tolerated the procedure with no complications. Images:still images obtained    Laterality:right  Block Type:fascia iliaca compartment  Injection Technique:single-shot  Needle Type:echogenic  Needle Gauge:21 G      Medications Used: ropivacaine (NAROPIN) 0.5 % injection, 30 mL  mepivacaine (CARBOCAINE) 1.5 % injection, 20 mL      Post Assessment  Injection Assessment: negative aspiration for heme, no paresthesia on injection and incremental injection  Patient Tolerance:comfortable throughout block  Complications:no

## 2020-07-31 LAB
ANION GAP SERPL CALCULATED.3IONS-SCNC: 9.6 MMOL/L (ref 5–15)
BUN SERPL-MCNC: 9 MG/DL (ref 8–23)
BUN/CREAT SERPL: 10.1 (ref 7–25)
CALCIUM SPEC-SCNC: 8.8 MG/DL (ref 8.6–10.5)
CHLORIDE SERPL-SCNC: 102 MMOL/L (ref 98–107)
CO2 SERPL-SCNC: 25.4 MMOL/L (ref 22–29)
CREAT SERPL-MCNC: 0.89 MG/DL (ref 0.57–1)
DEPRECATED RDW RBC AUTO: 41.9 FL (ref 37–54)
ERYTHROCYTE [DISTWIDTH] IN BLOOD BY AUTOMATED COUNT: 12.3 % (ref 12.3–15.4)
GFR SERPL CREATININE-BSD FRML MDRD: 63 ML/MIN/1.73
GLUCOSE SERPL-MCNC: 119 MG/DL (ref 65–99)
HCT VFR BLD AUTO: 28.8 % (ref 34–46.6)
HGB BLD-MCNC: 9.5 G/DL (ref 12–15.9)
MCH RBC QN AUTO: 30.5 PG (ref 26.6–33)
MCHC RBC AUTO-ENTMCNC: 33 G/DL (ref 31.5–35.7)
MCV RBC AUTO: 92.6 FL (ref 79–97)
PLATELET # BLD AUTO: 172 10*3/MM3 (ref 140–450)
PMV BLD AUTO: 10.8 FL (ref 6–12)
POTASSIUM SERPL-SCNC: 3.9 MMOL/L (ref 3.5–5.2)
RBC # BLD AUTO: 3.11 10*6/MM3 (ref 3.77–5.28)
SODIUM SERPL-SCNC: 137 MMOL/L (ref 136–145)
WBC # BLD AUTO: 10.95 10*3/MM3 (ref 3.4–10.8)

## 2020-07-31 PROCEDURE — 97110 THERAPEUTIC EXERCISES: CPT

## 2020-07-31 PROCEDURE — G0378 HOSPITAL OBSERVATION PER HR: HCPCS

## 2020-07-31 PROCEDURE — 63710000001 PROMETHAZINE PER 12.5 MG: Performed by: ORTHOPAEDIC SURGERY

## 2020-07-31 PROCEDURE — 97116 GAIT TRAINING THERAPY: CPT

## 2020-07-31 PROCEDURE — 80048 BASIC METABOLIC PNL TOTAL CA: CPT | Performed by: ORTHOPAEDIC SURGERY

## 2020-07-31 PROCEDURE — 85027 COMPLETE CBC AUTOMATED: CPT | Performed by: ORTHOPAEDIC SURGERY

## 2020-07-31 RX ORDER — HYDROMORPHONE HYDROCHLORIDE 2 MG/1
2 TABLET ORAL EVERY 4 HOURS PRN
Qty: 84 TABLET | Refills: 0 | Status: SHIPPED | OUTPATIENT
Start: 2020-07-31 | End: 2020-08-14

## 2020-07-31 RX ORDER — PSEUDOEPHEDRINE HCL 30 MG
100 TABLET ORAL 2 TIMES DAILY PRN
Qty: 30 EACH | Refills: 1 | Status: SHIPPED | OUTPATIENT
Start: 2020-07-31 | End: 2021-11-10

## 2020-07-31 RX ADMIN — SODIUM CHLORIDE, PRESERVATIVE FREE 3 ML: 5 INJECTION INTRAVENOUS at 21:54

## 2020-07-31 RX ADMIN — HYDROMORPHONE HYDROCHLORIDE 2 MG: 2 TABLET ORAL at 06:21

## 2020-07-31 RX ADMIN — Medication 2 TABLET: at 13:20

## 2020-07-31 RX ADMIN — ASPIRIN 325 MG: 325 TABLET, COATED ORAL at 09:00

## 2020-07-31 RX ADMIN — GABAPENTIN 100 MG: 100 CAPSULE ORAL at 21:54

## 2020-07-31 RX ADMIN — MUPIROCIN 1 APPLICATION: 20 OINTMENT TOPICAL at 09:00

## 2020-07-31 RX ADMIN — MUPIROCIN 1 APPLICATION: 20 OINTMENT TOPICAL at 21:53

## 2020-07-31 RX ADMIN — HYDROMORPHONE HYDROCHLORIDE 2 MG: 2 TABLET ORAL at 14:55

## 2020-07-31 RX ADMIN — ASPIRIN 325 MG: 325 TABLET, COATED ORAL at 21:53

## 2020-07-31 RX ADMIN — ATORVASTATIN CALCIUM 40 MG: 20 TABLET, FILM COATED ORAL at 21:54

## 2020-07-31 RX ADMIN — HYDROMORPHONE HYDROCHLORIDE 2 MG: 2 TABLET ORAL at 10:40

## 2020-07-31 RX ADMIN — PANTOPRAZOLE SODIUM 40 MG: 40 TABLET, DELAYED RELEASE ORAL at 06:21

## 2020-07-31 RX ADMIN — SODIUM CHLORIDE, PRESERVATIVE FREE 3 ML: 5 INJECTION INTRAVENOUS at 09:02

## 2020-07-31 RX ADMIN — LEVOTHYROXINE SODIUM 50 MCG: 50 TABLET ORAL at 09:00

## 2020-07-31 RX ADMIN — FERROUS SULFATE TAB 325 MG (65 MG ELEMENTAL FE) 325 MG: 325 (65 FE) TAB at 09:02

## 2020-07-31 RX ADMIN — PAROXETINE HYDROCHLORIDE HEMIHYDRATE 20 MG: 20 TABLET, FILM COATED ORAL at 09:00

## 2020-07-31 RX ADMIN — PROMETHAZINE HYDROCHLORIDE 12.5 MG: 12.5 TABLET ORAL at 13:20

## 2020-08-01 VITALS
HEART RATE: 78 BPM | RESPIRATION RATE: 16 BRPM | HEIGHT: 66 IN | OXYGEN SATURATION: 95 % | TEMPERATURE: 97.8 F | WEIGHT: 154.5 LBS | SYSTOLIC BLOOD PRESSURE: 96 MMHG | DIASTOLIC BLOOD PRESSURE: 58 MMHG | BODY MASS INDEX: 24.83 KG/M2

## 2020-08-01 PROCEDURE — G0378 HOSPITAL OBSERVATION PER HR: HCPCS

## 2020-08-01 PROCEDURE — 97530 THERAPEUTIC ACTIVITIES: CPT

## 2020-08-01 PROCEDURE — 97116 GAIT TRAINING THERAPY: CPT

## 2020-08-01 RX ADMIN — HYDROMORPHONE HYDROCHLORIDE 2 MG: 2 TABLET ORAL at 10:05

## 2020-08-01 RX ADMIN — HYDROMORPHONE HYDROCHLORIDE 2 MG: 2 TABLET ORAL at 02:05

## 2020-08-01 RX ADMIN — LEVOTHYROXINE SODIUM 50 MCG: 50 TABLET ORAL at 08:13

## 2020-08-01 RX ADMIN — HYDROMORPHONE HYDROCHLORIDE 2 MG: 2 TABLET ORAL at 05:59

## 2020-08-01 RX ADMIN — SODIUM CHLORIDE, PRESERVATIVE FREE 3 ML: 5 INJECTION INTRAVENOUS at 08:13

## 2020-08-01 RX ADMIN — FERROUS SULFATE TAB 325 MG (65 MG ELEMENTAL FE) 325 MG: 325 (65 FE) TAB at 08:13

## 2020-08-01 RX ADMIN — PAROXETINE HYDROCHLORIDE HEMIHYDRATE 20 MG: 20 TABLET, FILM COATED ORAL at 08:13

## 2020-08-01 RX ADMIN — PANTOPRAZOLE SODIUM 40 MG: 40 TABLET, DELAYED RELEASE ORAL at 05:36

## 2020-08-01 RX ADMIN — ASPIRIN 325 MG: 325 TABLET, COATED ORAL at 08:13

## 2020-08-01 NOTE — PROGRESS NOTES
Orthopaedic Surgery  Progress Note  8/1/2020    Patients Name:  Luisa Sepulveda  YOB: 1950  Age: 69 y.o.  Medical Records Number:  [id number]  Date of Admission: 7/30/2020    No complaints except pain    Vitals:  Vitals:    07/31/20 2156 07/31/20 2319 08/01/20 0246 08/01/20 0652   BP: 104/50 104/55 106/61 96/58   BP Location: Right arm Right arm Left arm Right arm   Patient Position: Lying Lying Lying Lying   Pulse:  89 82 78   Resp: 16 16 16 16   Temp:  98.6 °F (37 °C) 98.2 °F (36.8 °C) 97.8 °F (36.6 °C)   TempSrc:  Skin Skin Skin   SpO2: 90% 91% 95% 95%   Weight:       Height:           RLE:  NVI, calf nontender, sensation intact  No signs of DVT    Incision: clean, no signs of infection    Lab Results (last 24 hours)     ** No results found for the last 24 hours. **          Assesment/Plan:    Procedures:  Right BALJINDER  Postoperative Day: 2  Weightbearing Status:  WBAT with walker  DVT Prophylaxis:  ASA for DVT prophylaxis    Reason for Inpatient Admission:  I certify that this patient requires inpatient admission for greater than 2 midnights due to fall risk with history of frequent falls    Disposition:  Home with home health after PT today, if comfortable and mobilizing safely    Iker Jc PA-C  [facility]  [address] [place] [id number]  [phone]    8/1/2020

## 2020-08-01 NOTE — PLAN OF CARE
Problem: Patient Care Overview  Goal: Plan of Care Review  Flowsheets  Taken 8/1/2020 0917  Plan of Care Reviewed With: patient  Taken 8/1/2020 0921  Outcome Summary: Pt. ambulating for slightly increased distance ~70 feet with CGA and rwx. Pt. continues to have limited WB on R LE due to fear/pain. Cues to improve heel strike at contact. Performed 1 stair x2 to replicate home set-up with rwx. Reviewed hip precautions with pt. able to verbalize understanding at end of session. Plan is to D/C home with HH today.   Patient was wearing a face mask during this therapy encounter. Therapist used appropriate personal protective equipment including eye protection, mask, and gloves.  Mask used was standard procedure mask. Appropriate PPE was worn during the entire therapy session. Hand hygiene was completed before and after therapy session. Patient is not in enhanced droplet precautions.

## 2020-08-01 NOTE — PLAN OF CARE
Problem: Patient Care Overview  Goal: Plan of Care Review  Outcome: Ongoing (interventions implemented as appropriate)  Flowsheets (Taken 7/31/2020 2029)  Progress: improving  Plan of Care Reviewed With: patient  Outcome Summary: Patient is ambulating using walker and x1 assist, requires some cues for safety. VSS and voiding function is intact. Pain is managed with po meds. Paitent educated on PONV management. IS encouraged. Patient anticipates d/c home with hh tomorrow.

## 2020-08-01 NOTE — THERAPY TREATMENT NOTE
Patient Name: Luisa Sepulveda  : 1950    MRN: [id number]                              Today's Date: 2020       Admit Date: 2020    Visit Dx:     ICD-10-CM ICD-9-CM   1. Primary osteoarthritis of right hip M16.11 715.15   2. Right hip pain M25.551 719.45   3. Osteoarthritis of right hip, unspecified osteoarthritis type M16.11 715.95     Patient Active Problem List   Diagnosis   • Right shoulder pain   • Osteoarthritis of right hip   • Right hip pain   • Greater trochanteric bursitis of right hip   • Primary osteoarthritis of right hip     Past Medical History:   Diagnosis Date   • Anxiety and depression    • GERD (gastroesophageal reflux disease)    • History of hemoptysis     APPROX 30 YRS AGO   • Hyperlipidemia    • Hypothyroidism    • Insomnia    • Osteoarthritis    • PONV (postoperative nausea and vomiting)    • Right hip pain    • Sleep apnea     DOES NOT WEAR MACHINE     Past Surgical History:   Procedure Laterality Date   • BREAST AUGMENTATION Bilateral    • COLONOSCOPY     • ENDOSCOPY     • HYSTERECTOMY     • LUMBAR DISC SURGERY     • TONSILLECTOMY     • TOTAL HIP ARTHROPLASTY Right 2020    Procedure: RIGHT TOTAL HIP ARTHROPLASTY;  Surgeon: Iker Cisneros MD;  Location: [facility];  Service: Orthopedics;  Laterality: Right;     General Information     Row Name 20 0945          PT Evaluation Time/Intention    Document Type  therapy note (daily note)  -     Mode of Treatment  individual therapy;physical therapy  -     Row Name 20 0945          General Information    Existing Precautions/Restrictions  fall;hip, posterior  -     Row Name 20 0945          Cognitive Assessment/Intervention- PT/OT    Orientation Status (Cognition)  oriented x 4  -       User Key  (r) = Recorded By, (t) = Taken By, (c) = Cosigned By    Initials Name Provider Type     Fidelina Aguirre, PT Physical Therapist        Mobility     Row Name 20 0945          Bed Mobility  Assessment/Treatment    Comment (Bed Mobility)  in chair at PT arrival  -     Row Name 08/01/20 0945          Sit-Stand Transfer    Sit-Stand [place] (Transfers)  contact guard;nonverbal cues (demo/gesture);verbal cues  -     Assistive Device (Sit-Stand Transfers)  walker, front-wheeled  -     Row Name 08/01/20 0945          Gait/Stairs Assessment/Training    Gait/Stairs Assessment/Training  gait/ambulation independence  -     [place] Level (Gait)  contact guard  -     Assistive Device (Gait)  walker, front-wheeled  -     Distance in Feet (Gait)  70'  -     Pattern (Gait)  step-to;step-through  -     Deviations/Abnormal Patterns (Gait)  contreras decreased;stride length decreased;antalgic  -     Bilateral Gait Deviations  forward flexed posture  -     Right Sided Gait Deviations  weight shift ability decreased;heel strike decreased  -     [place] Level (Stairs)  contact guard;nonverbal cues (demo/gesture);verbal cues  -     Assistive Device (Stairs)  walker, front-wheeled  -     Number of Steps (Stairs)  1  -     Ascending Technique (Stairs)  step-to-step  -     Descending Technique (Stairs)  step-to-step  -     Comment (Gait/Stairs)  Continues to have limited WB on R LE secondary to pain/fear. VC to keep try heel strike or foot flat at contact.   -     Row Name 08/01/20 0945          Mobility Assessment/Intervention    Extremity Weight-bearing Status  right lower extremity  -     Right Lower Extremity (Weight-bearing Status)  weight-bearing as tolerated (WBAT)  -       User Key  (r) = Recorded By, (t) = Taken By, (c) = Cosigned By    Initials Name Provider Type     Fidelina Aguirre PT Physical Therapist        Obj/Interventions     Row Name 08/01/20 0951          Therapeutic Exercise    Lower Extremity (Therapeutic Exercise)  -- BALJINDER protocol  -     Exercise Type (Therapeutic Exercise)  AROM (active range of motion)  -     Position (Therapeutic Exercise)   seated  -     Sets/Reps (Therapeutic Exercise)  15  -       User Key  (r) = Recorded By, (t) = Taken By, (c) = Cosigned By    Initials Name Provider Type    Fidelina Pillai PT Physical Therapist        Goals/Plan    No documentation.       Clinical Impression     Row Name 08/01/20 0947          Pain Scale: Numbers Pre/Post-Treatment    Pain Scale: Numbers, Pretreatment  3/10  -     Pain Scale: Numbers, Post-Treatment  3/10  -     Pain Intervention(s)  Repositioned  -     Row Name 08/01/20 0947          Plan of Care Review    Plan of Care Reviewed With  patient  -     Outcome Summary  Pt. ambulating for slightly increased distance ~70 feet with CGA and rwx. Pt. continues to have limited WB on R LE due to fear/pain. Cues to improve heel strike at contact. Performed 1 stair x2 to replicate home set-up with rwx. Reviewed hip precautions with pt. able to verbalize understanding at end of session. Plan is to D/C home with HH today.  -     Row Name 08/01/20 0947          Positioning and Restraints    Pre-Treatment Position  sitting in chair/recliner  -     Post Treatment Position  chair  -MH     In Chair  reclined;sitting;call light within reach;encouraged to call for assist;exit alarm on  -       User Key  (r) = Recorded By, (t) = Taken By, (c) = Cosigned By    Initials Name Provider Type    Fidelina Pillai, LUIS ARMANDO Physical Therapist        Outcome Measures     Row Name 08/01/20 0905          How much help from another person do you currently need...    Turning from your back to your side while in flat bed without using bedrails?  3  -MH     Moving from lying on back to sitting on the side of a flat bed without bedrails?  3  -MH     Moving to and from a bed to a chair (including a wheelchair)?  3  -MH     Standing up from a chair using your arms (e.g., wheelchair, bedside chair)?  3  -MH     Climbing 3-5 steps with a railing?  3  -MH     To walk in hospital room?  3  -MH     AM-PAC 6 Clicks Score (PT)   18  -MH       User Key  (r) = Recorded By, (t) = Taken By, (c) = Cosigned By    Initials Name Provider Type    Fidelina Pillai, PT Physical Therapist        Physical Therapy Education                 Title: PT OT SLP Therapies (Done)     Topic: Physical Therapy (Done)     Point: Mobility training (Done)     Description:   Instruct learner(s) on safety and technique for assisting patient out of bed, chair or wheelchair.  Instruct in the proper use of assistive devices, such as walker, crutches, cane or brace.              Patient Friendly Description:   It's important to get you on your feet again, but we need to do so in a way that is safe for you. Falling has serious consequences, and your personal safety is the most important thing of all.        When it's time to get out of bed, one of us or a family member will sit next to you on the bed to give you support.     If your doctor or nurse tells you to use a walker, crutches, a cane, or a brace, be sure you use it every time you get out of bed, even if you think you don't need it.    Learning Progress Summary           Patient Acceptance, E,TB,D, VU,DU,NR by  at 8/1/2020 0949    Acceptance, E,TB, VU,DU by NL at 8/1/2020 0910    Acceptance, E,TB,D, VU,NR by  at 7/31/2020 1331    Acceptance, E,TB,D, VU,NR by  at 7/30/2020 1502   Family Acceptance, E,TB,D, VU,NR by  at 7/30/2020 1502                   Point: Home exercise program (Done)     Description:   Instruct learner(s) on appropriate technique for monitoring, assisting and/or progressing patient with therapeutic exercises and activities.              Learning Progress Summary           Patient Acceptance, E,TB,D, VU,DU,NR by  at 8/1/2020 0949    Acceptance, E,TB, VU,DU by NL at 8/1/2020 0910    Acceptance, E,TB,D, VU,NR by  at 7/31/2020 1331    Acceptance, E,TB,D, VU,NR by  at 7/30/2020 1502   Family Acceptance, E,TB,D, VU,NR by  at 7/30/2020 1502                   Point: Body mechanics (Done)      Description:   Instruct learner(s) on proper positioning and spine alignment for patient and/or caregiver during mobility tasks and/or exercises.              Learning Progress Summary           Patient Acceptance, E,TB,D, VU,DU,NR by  at 8/1/2020 0949    Acceptance, E,TB, VU,DU by NL at 8/1/2020 0910    Acceptance, E,TB,D, VU,NR by  at 7/31/2020 1331                   Point: Precautions (Done)     Description:   Instruct learner(s) on prescribed precautions during mobility and gait tasks              Learning Progress Summary           Patient Acceptance, E,TB,D, VU,DU,NR by  at 8/1/2020 0949    Acceptance, E,TB, VU,DU by NL at 8/1/2020 0910    Acceptance, E,TB,D, VU,NR by  at 7/31/2020 1331    Acceptance, E,TB,D, VU,NR by  at 7/30/2020 1502   Family Acceptance, E,TB,D, VU,NR by  at 7/30/2020 1502                               User Key     Initials Effective Dates Name Provider Type Discipline    NL 06/16/16 -  Symone Chang RN Registered Nurse Nurse     04/03/18 -  Ophelia Jacob, PT Physical Therapist PT     03/07/18 -  Julia Norman PTA Physical Therapy Assistant PT     05/26/20 -  Fidelina Aguirre, LUIS ARMANDO Physical Therapist PT              PT Recommendation and Plan     Plan of Care Reviewed With: patient  Outcome Summary: Pt. ambulating for slightly increased distance ~70 feet with CGA and rwx. Pt. continues to have limited WB on R LE due to fear/pain. Cues to improve heel strike at contact. Performed 1 stair x2 to replicate home set-up with rwx. Reviewed hip precautions with pt. able to verbalize understanding at end of session. Plan is to D/C home with HH today.     Time Calculation:   PT Charges     Row Name 08/01/20 0950             Time Calculation    Start Time  0919  -      Stop Time  0944  -      Time Calculation (min)  25 min  -      PT Received On  08/01/20  -      PT - Next Appointment  08/02/20  -         Time Calculation- PT    Total Timed Code Minutes- PT   24 minute(s)  -        User Key  (r) = Recorded By, (t) = Taken By, (c) = Cosigned By    Initials Name Provider Type     Fidelina Aguirre, PT Physical Therapist        Therapy Charges for Today     Code Description Service Date Service Provider Modifiers Qty    51279573761  PT THERAPEUTIC ACT EA 15 MIN 8/1/2020 Fidelina Aguirre, PT GP 1    07813930083 HC GAIT TRAINING EA 15 MIN 8/1/2020 Fidelina Aguirre, PT GP 1          PT G-Codes  Outcome Measure Options: AM-PAC 6 Clicks Basic Mobility (PT)  AM-PAC 6 Clicks Score (PT): 18    Fidelina Aguirre PT  8/1/2020

## 2020-08-11 ENCOUNTER — OFFICE VISIT CONVERTED (OUTPATIENT)
Dept: FAMILY MEDICINE CLINIC | Age: 70
End: 2020-08-11
Attending: FAMILY MEDICINE

## 2020-09-23 ENCOUNTER — OFFICE VISIT CONVERTED (OUTPATIENT)
Dept: FAMILY MEDICINE CLINIC | Age: 70
End: 2020-09-23
Attending: FAMILY MEDICINE

## 2020-10-19 ENCOUNTER — OFFICE VISIT CONVERTED (OUTPATIENT)
Dept: FAMILY MEDICINE CLINIC | Age: 70
End: 2020-10-19
Attending: NURSE PRACTITIONER

## 2020-10-22 LAB
ALBUMIN SERPL-MCNC: 4.7 G/DL
ALBUMIN/GLOB SERPL: 1.8 {RATIO}
ALP SERPL-CCNC: 104 IU/L
ALT SERPL-CCNC: 22 IU/L
AST SERPL-CCNC: 27 IU/L
BASOPHILS # BLD AUTO: 0 X10E3/UL
BASOPHILS NFR BLD AUTO: 0 %
BILIRUB SERPL-MCNC: 0.3 MG/DL
BUN SERPL-MCNC: 13 MG/DL
BUN/CREAT SERPL: 11
CALCIUM SERPL-MCNC: 9.9 MG/DL
CHLORIDE SERPL-SCNC: 103 MMOL/L
CO2 SERPL-SCNC: 25 MMOL/L
CONV IMMATURE GRAN: 0 %
CONV TOTAL PROTEIN: 7.3 G/DL
CREAT UR-MCNC: 1.14 MG/DL
EOSINOPHIL # BLD AUTO: 0.3 X10E3/UL
EOSINOPHIL # BLD AUTO: 5 %
ERYTHROCYTE [DISTWIDTH] IN BLOOD BY AUTOMATED COUNT: 12.8 %
GLOBULIN UR ELPH-MCNC: 2.6 G/DL
GLUCOSE SERPL-MCNC: 96 MG/DL
HCT VFR BLD AUTO: 38.4 %
HGB BLD-MCNC: 12.3 G/DL
IMM GRANULOCYTES # BLD: 0 X10E3/UL
LYMPHOCYTES # BLD AUTO: 1.8 X10E3/UL
LYMPHOCYTES NFR BLD AUTO: 35 %
MAGNESIUM SERPL-MCNC: 2.3 MG/DL
MCH RBC QN AUTO: 28.1 PG
MCHC RBC AUTO-ENTMCNC: 32 G/DL
MCV RBC AUTO: 88 FL
MONOCYTES # BLD AUTO: 0.5 X10E3/UL
MONOCYTES NFR BLD AUTO: 9 %
NEUTROPHILS # BLD AUTO: 2.5 X10E3/UL
NEUTROPHILS NFR BLD AUTO: 51 %
PLATELET # BLD AUTO: 271 X10E3/UL
POTASSIUM SERPL-SCNC: 4.7 MMOL/L
RBC # BLD AUTO: 4.37 X10E6/UL
SODIUM SERPL-SCNC: 142 MMOL/L
TSH SERPL-ACNC: 0.94 UIU/ML
VIT B12 SERPL-MCNC: 440 PG/ML
WBC # BLD AUTO: 5.1 X10E3/UL
ZINC SERPL-MCNC: 85 UG/DL

## 2021-01-04 ENCOUNTER — OFFICE VISIT CONVERTED (OUTPATIENT)
Dept: FAMILY MEDICINE CLINIC | Age: 71
End: 2021-01-04
Attending: NURSE PRACTITIONER

## 2021-01-12 ENCOUNTER — CONVERSION ENCOUNTER (OUTPATIENT)
Dept: SURGERY | Facility: CLINIC | Age: 71
End: 2021-01-12

## 2021-01-12 ENCOUNTER — OFFICE VISIT CONVERTED (OUTPATIENT)
Dept: SURGERY | Facility: CLINIC | Age: 71
End: 2021-01-12
Attending: SURGERY

## 2021-01-29 ENCOUNTER — HOSPITAL ENCOUNTER (OUTPATIENT)
Dept: GENERAL RADIOLOGY | Facility: HOSPITAL | Age: 71
Discharge: HOME | End: 2021-01-29
Attending: SURGERY

## 2021-02-01 ENCOUNTER — HOSPITAL ENCOUNTER (OUTPATIENT)
Dept: OTHER | Facility: HOSPITAL | Age: 71
Discharge: HOME | End: 2021-02-01
Attending: SURGERY

## 2021-02-15 ENCOUNTER — OFFICE VISIT CONVERTED (OUTPATIENT)
Dept: FAMILY MEDICINE CLINIC | Age: 71
End: 2021-02-15
Attending: NURSE PRACTITIONER

## 2021-02-23 ENCOUNTER — OFFICE VISIT CONVERTED (OUTPATIENT)
Dept: SURGERY | Facility: CLINIC | Age: 71
End: 2021-02-23
Attending: SURGERY

## 2021-02-25 ENCOUNTER — OFFICE VISIT CONVERTED (OUTPATIENT)
Dept: FAMILY MEDICINE CLINIC | Age: 71
End: 2021-02-25
Attending: NURSE PRACTITIONER

## 2021-03-02 ENCOUNTER — OFFICE VISIT CONVERTED (OUTPATIENT)
Dept: OTOLARYNGOLOGY | Facility: CLINIC | Age: 71
End: 2021-03-02
Attending: OTOLARYNGOLOGY

## 2021-03-15 ENCOUNTER — HOSPITAL ENCOUNTER (OUTPATIENT)
Dept: PHYSICAL THERAPY | Facility: CLINIC | Age: 71
Setting detail: RECURRING SERIES
Discharge: STILL ADMITTED OR ADMINISTRATIVE | End: 2021-04-01
Attending: ORTHOPAEDIC SURGERY

## 2021-04-26 ENCOUNTER — HOSPITAL ENCOUNTER (OUTPATIENT)
Dept: PHYSICAL THERAPY | Facility: CLINIC | Age: 71
Setting detail: RECURRING SERIES
Discharge: HOME | End: 2021-04-26
Attending: ORTHOPAEDIC SURGERY

## 2021-05-07 ENCOUNTER — OFFICE VISIT CONVERTED (OUTPATIENT)
Dept: FAMILY MEDICINE CLINIC | Age: 71
End: 2021-05-07
Attending: FAMILY MEDICINE

## 2021-05-10 NOTE — H&P
History and Physical      Patient Name: Luisa Sepulveda   Patient ID: [id number]   Sex: Female   YOB: 1950    Primary Care Provider: Kari Harris MD   Referring Provider: Katrin BRUNNER    Visit Date: March 2, 2021    Provider: Dennis Garland MD   Location: [facility]   Location Address: [address] [place] [address], [address] [place] [address]  [id number]   Location Phone: [phone]          Chief Complaint     1.  Chronic cough    2.  GERD    3.  Nasal sores       History Of Present Illness  Luisa Sepulveda is a 70 year old /White female who presents to the office today as a consult from Katrin BRUNNER.      She presents the clinic today for evaluation of a many year history of chronic cough, intermittent voice hoarseness, GERD, and nasal sores.  She notes that over the years she has noted that her singing is not as clear as it once was.  She denies any significant throat pain with this.  She denies any dysphagia or weight loss.  She was previously a smoker for about 20 years, but quit in 1988.  She notes that she does have a chronic cough and throat clearing issue.  She does have reflux, and takes omeprazole for this.  She has recently had a barium esophagram which showed some possible lower esophageal dysmotility which was mild.  She was sent here for further evaluation due to her other symptoms.     Informs me that she does tend to eat late night meals, and even eats in the middle of the night as she does not sleep well.  She tends to eat many foods that are sugary, and drinks sweet tea throughout the day.    She also describes having nasal sores, and frequently picking at the inside of her nose due to discomfort related to this.       Past Medical History  Anxiety; Change in voice; Cough; GERD (gastroesophageal reflux disease); Hoarseness; HTN (hypertension); Hyperlipemia; Hypothyroidism; Throat pain in adult         Past Surgical History  Breast augmentation;  "Cholecystectomy; Discectomy; Hysterectomy         Medication List  atorvastatin 40 mg oral tablet; carvedilol 3.125 mg oral tablet; clonazepam 0.5 mg oral tablet; gabapentin 100 mg oral capsule; levothyroxine 50 mcg oral tablet; meloxicam 15 mg oral tablet; omeprazole 40 mg oral capsule,delayed release(DR/EC); Paxil 30 mg oral tablet; Zofran 8 mg oral tablet         Allergy List  Codeine Sulfate; oxycodone         Family Medical History  Family history of breast cancer; Family history of cancer         Social History  Tobacco (Former)         Review of Systems  · Constitutional  o Denies  o : fever, night sweats, weight loss  · Eyes  o Denies  o : discharge from eye, impaired vision  · HENT  o Admits  o : *See HPI  · Cardiovascular  o Denies  o : chest pain, irregular heart beats  · Respiratory  o Denies  o : shortness of breath, wheezing, coughing up blood  · Gastrointestinal  o Admits  o : reflux  o Denies  o : heartburn, vomiting blood  · Genitourinary  o Denies  o : frequency  · Integument  o Denies  o : rash, skin dryness  · Neurologic  o Denies  o : seizures, loss of balance, loss of consciousness, dizziness  · Endocrine  o Denies  o : cold intolerance, heat intolerance  · Heme-Lymph  o Admits  o : anemia  o Denies  o : easy bleeding      Vitals  Date Time BP Position Site L\R Cuff Size HR RR TEMP (F) WT  HT  BMI kg/m2 BSA m2 O2 Sat FR L/min FiO2 HC       03/02/2021 10:38 AM        97 161lbs 6oz 5'  6\" 26.05 1.85             Physical Examination  · Constitutional  o Appearance  o : well developed, well-nourished, alert and in no acute distress, voice clear and strong  · Head and Face  o Head  o :   § Inspection  § : no deformities or lesions  o Face  o :   § Inspection  § : No facial lesions; House-Brackmann I/VI bilaterally  § Palpation  § : No TMJ crepitus nor  muscle tenderness bilaterally  · Eyes  o Vision  o :   § Visual Fields  § : Extraocular movements are intact. No spontaneous or " gaze-induced nystagmus.  o Conjunctivae  o : clear  o Sclerae  o : clear  o Pupils and Irises  o : pupils equal, round, and reactive to light.   · Ears, Nose, Mouth and Throat  o Ears  o :   § External Ears  § : appearance within normal limits, no lesions present  § Otoscopic Examination  § : tympanic membrane appearance within normal limits bilaterally without perforations, well-aerated middle ears  § Hearing  § : intact to conversational voice both ears  o Nose  o :   § External Nose  § : appearance normal  § Intranasal Exam  § : mucosa within normal limits, vestibules normal, no intranasal lesions present, septum midline, sinuses non tender to percussion  o Oral Cavity  o :   § Oral Mucosa  § : oral mucosa normal without pallor or cyanosis  § Lips  § : lip appearance normal  § Teeth  § : normal dentition for age  § Gums  § : gums pink, non-swollen, no bleeding present  § Tongue  § : tongue appearance normal; normal mobility  § Palate  § : hard palate normal, soft palate appearance normal with symmetric mobility  o Throat  o :   § Oropharynx  § : no inflammation or lesions present, tonsils within normal limits  § Hypopharynx  § : appearance within normal limits, superior epiglottis within normal limits  § Larynx  § : appearance within normal limits, vocal cords within normal limits, no lesions present  o Nasopharyngoscopy  o : The patients nares were anesthetized with Lidocaine with Afrin. After this was done, the flexible nasopharyngoscope was passed through both the left and right sides. The nasal cavities free of any lesions, polyps, or purulence. The vocal folds are bilaterally mobile with good excursion. There is some mild interarytenoid pachydermia, likely from GERD. There is some mild vocal fold thinning, which is likely age-related. I saw no evidence of lesions or masses.  · Neck  o Inspection/Palpation  o : normal appearance, no masses or tenderness, trachea midline; thyroid size normal, nontender, no  nodules or masses present on palpation  · Respiratory  o Respiratory Effort  o : breathing unlabored  o Inspection of Chest  o : normal appearance, no retractions  · Cardiovascular  o Heart  o : regular rate and rhythm  · Lymphatic  o Neck  o : no lymphadenopathy present  o Supraclavicular Nodes  o : no lymphadenopathy present  o Preauricular Nodes  o : no lymphadenopathy present  · Skin and Subcutaneous Tissue  o General Inspection  o : Regarding face and neck - there are no rashes present, no lesions present, and no areas of discoloration  · Neurologic  o Cranial Nerves  o : cranial nerves II-XII are grossly intact bilaterally  o Gait and Station  o : normal gait, able to stand without diffculty  · Psychiatric  o Judgement and Insight  o : judgment and insight intact  o Mood and Affect  o : mood normal, affect appropriate          Assessment  · Chronic cough     786.2/R05  · GERD (gastroesophageal reflux disease)     530.81/K21.9  · Voice hoarseness     784.42/R49.0  · Nasal sore     478.19/J34.89      Plan  · Orders  o Laryngoscopy (Flex) [facility] ([id number]) - 786.2/R05, 530.81/K21.9, 784.42/R49.0 - 03/02/2021  · Medications  o mupirocin calcium 2 % topical cream   SIG: apply a small amount to the affected area by topical route 3 times per day for 10 days   DISP: (1) Tube with 0 refills  Prescribed on 03/02/2021     o Medications have been Reconciled  o Transition of Care or Provider Policy  · Instructions  o She presents the clinic today for evaluation of a many year history of chronic cough, intermittent voice hoarseness, GERD, and nasal sores. She notes that over the years she has noted that her singing is not as clear as it once was. She denies any significant throat pain with this. She denies any dysphagia or weight loss. She was previously a smoker for about 20 years, but quit in 1988. She notes that she does have a chronic cough and throat clearing issue. She does have reflux, and takes omeprazole for this. She  has recently had a barium esophagram which showed some possible lower esophageal dysmotility which was mild. She was sent here for further evaluation due to her other symptoms. Informs me that she does tend to eat late night meals, and even eats in the middle of the night as she does not sleep well. She tends to eat many foods that are sugary, and drinks sweet tea throughout the day. Examination today her voice was clear. I did perform nasolaryngoscopy which revealed interarytenoid pachydermia, likely from GERD. I counseled her on avoiding snacking, avoiding foods that exacerbate GERD, and avoiding late night meals and eating in the middle of the night. I have also counseled her on chronic habitual cough and she understands to take sips of water anytime she has the urge to cough or clear her throat to try to break herself of the habit. I reassured her that I did not see any worrisome lesions.She also describes having nasal sores, and frequently picking at the inside of her nose due to discomfort related to this. Her nose looked okay today, but I did prescribe mupirocin ointment for her to use, and she will avoid picking at the sores to allow them to heal. I will be glad to see her back on an as-needed basis should there be any further issues.  o Electronically Identified Patient Education Materials Provided Electronically  · Correspondence  o ENT Letter to Referring MD (Katrin BRUNNER) - 03/02/2021            Electronically Signed by: Dennis Garland MD -Author on March 2, 2021 01:10:00 PM

## 2021-05-10 NOTE — H&P
History and Physical      Patient Name: Luisa Sepulveda   Patient ID: [id number]   Sex: Female   YOB: 1950    Primary Care Provider: Kari Harris MD   Referring Provider: Katrin BRUNNER    Visit Date: January 12, 2021    Provider: Daniel Lee MD   Location: [facility] [place]   Location Address: [address] [place] [address]  [address] [place] [address]  [id number]   Location Phone: [phone]          Chief Complaint  · dysphagia  · persistent cough      History Of Present Illness     Ms. Sepulveda came in today for evaluation. She is a very nice lady who was referred with about a one year history of a progressive cough. She has also noticed recently that she really can't project and sing like she used to although she can't really tell that her voice has necessarily changed. She also has pain in her throat. She has been a smoker in the past but it has been several years since she smoked. She has not had any weight loss. She is not really vomiting.       Past Medical History  Anxiety; GERD (gastroesophageal reflux disease); HTN (hypertension); Hyperlipemia; Hypothyroidism         Past Surgical History  Breast augmentation; Cholecystectomy; Discectomy; Hysterectomy         Medication List  atorvastatin 40 mg oral tablet; carvedilol 3.125 mg oral tablet; clonazepam 0.5 mg oral tablet; gabapentin 100 mg oral capsule; levothyroxine 50 mcg oral tablet; meloxicam 15 mg oral tablet; omeprazole 40 mg oral capsule,delayed release(DR/EC); Paxil 30 mg oral tablet; Zofran 8 mg oral tablet         Allergy List  Codeine Sulfate; oxycodone         Family Medical History  Family history of breast cancer         Social History  Tobacco (Former)         Review of Systems  · Constitutional  o Denies  o : fever  · Eyes  o Denies  o : yellowish discoloration of eyes  · HENT  o Admits  o : hoarseness, difficulty swallowing  · Cardiovascular  o Denies  o : chest pain on  "exertion  · Respiratory  o Admits  o : cough  o Denies  o : shortness of breath  · Gastrointestinal  o Denies  o : nausea, vomiting, diarrhea, constipation  · Integument  o Denies  o : rash  · Neurologic  o Denies  o : tingling or numbness  · Musculoskeletal  o Admits  o : joint pain  · Endocrine  o Denies  o : weight gain, weight loss      Vitals  Date Time BP Position Site L\R Cuff Size HR RR TEMP (F) WT  HT  BMI kg/m2 BSA m2 O2 Sat FR L/min FiO2 HC       01/12/2021 09:11 AM       16 97.2 154lbs 6oz 5'  6\" 24.92 1.81             Physical Examination  · Constitutional  o Appearance  o : well-nourished, well developed, alert, in no acute distress  · Head and Face  o Head  o :   § Inspection  § : atraumatic, normocephalic  · Neck  o Inspection/Palpation  o : supple, normal range of motion  · Respiratory  o Inspection of Chest  o : normal inspection  o Auscultation of Lungs  o : breath sounds normal, no distress, clear to ascultate bilaterally  · Cardiovascular  o Heart  o :   § Auscultation of Heart  § : regular rate and rhythm, no murmur, gallop or rub  · Gastrointestinal  o Abdominal Examination  o : normal bowel sounds, non-tender, soft          Assessment  · Pharyngoesophageal dysphagia     787.24/R13.14  · Cough in adult patient     786.2/R05       Persistent cough that has been going on for about a year. She also has noted that she can't really sing anymore although she can't really tell that there has been a change in her voice. She is having some pain in her throat and will occasionally have a sense of dysphagia but this appears to more pharyngeal rather than lower in the esophagus. She is a former smoker.       Plan  · Orders  o CXR PA/Lat [facility] ([id number]) - - 01/12/2021  o Upper GI and small bowel series ([id number]) - - 01/12/2021  · Medications  o Medications have been Reconciled  o Transition of Care or Provider Policy     We will get a chest x-ray and make sure she does not have any evidence of a " lung mass. We will also get an upper GI and make sure she does not appear to have any type of dysmotility problem. We will make further recommendations based on the results of those studies.             Electronically Signed by: Pearl Walsh-, -Author on January 13, 2021 08:48:57 AM  Electronically Co-signed by: Daniel Lee MD -Reviewer on January 15, 2021 11:49:23 AM

## 2021-05-14 VITALS — TEMPERATURE: 97.2 F | RESPIRATION RATE: 16 BRPM | HEIGHT: 66 IN | WEIGHT: 154.37 LBS | BODY MASS INDEX: 24.81 KG/M2

## 2021-05-14 VITALS — WEIGHT: 162.25 LBS | BODY MASS INDEX: 26.08 KG/M2 | TEMPERATURE: 96.7 F | HEIGHT: 66 IN

## 2021-05-14 VITALS — HEIGHT: 66 IN | WEIGHT: 161.37 LBS | BODY MASS INDEX: 25.94 KG/M2 | TEMPERATURE: 97 F

## 2021-05-14 NOTE — PROGRESS NOTES
"   Progress Note      Patient Name: Luisa Sepulveda   Patient ID: [id number]   Sex: Female   YOB: 1950    Primary Care Provider: Kari Harris MD   Referring Provider: Katrin BRUNNER    Visit Date: February 23, 2021    Provider: Daniel Lee MD   Location: [facility]   Location Address: [address] [place] [address] [facility]  [address] [place] [address]  [id number]   Location Phone: [phone]          Chief Complaint  · cough  · throat pain      History Of Present Illness     Ms. Sepulveda came back in today for evaluation. We did a chest x-ray and an upper GI for her. Her chest x-ray was normal. Her esophagogram was also basically normal. She had perhaps some mild tertiary contractions in the distal esophagus but otherwise it looked fine. The esophagus was not dilated. They did not see any type of mucosal defects. She still is complaining primarily of a persistent cough and pain in her throat. She also notes that when she tries to sing, she can't really project very well now.       Past Medical History  Anxiety; GERD (gastroesophageal reflux disease); HTN (hypertension); Hyperlipemia; Hypothyroidism         Past Surgical History  Breast augmentation; Cholecystectomy; Discectomy; Hysterectomy         Medication List  atorvastatin 40 mg oral tablet; carvedilol 3.125 mg oral tablet; clonazepam 0.5 mg oral tablet; gabapentin 100 mg oral capsule; levothyroxine 50 mcg oral tablet; meloxicam 15 mg oral tablet; omeprazole 40 mg oral capsule,delayed release(DR/EC); Paxil 30 mg oral tablet; Zofran 8 mg oral tablet         Allergy List  Codeine Sulfate; oxycodone         Family Medical History  Family history of breast cancer         Social History  Tobacco (Former)         Vitals  Date Time BP Position Site L\R Cuff Size HR RR TEMP (F) WT  HT  BMI kg/m2 BSA m2 O2 Sat FR L/min FiO2 HC       02/23/2021 09:10 AM        96.7 162lbs 4oz 5'  6\" 26.19 1.85             Physical Examination   "   Today on physical exam, she appears well. Her abdomen is soft.           Assessment  · Cough present for greater than 3 weeks     786.2/R05  · Throat pain in adult     784.1/R07.0       Persistent cough and throat pain as well as the inability to sing. She has had a negative chest x-ray and negative barium esophagogram.       Plan  · Medications  o Medications have been Reconciled  o Transition of Care or Provider Policy     I have told Luisa I think we probably need her to see one of the ENT doctors because she probably could benefit from a direct laryngoscopy in the office, just to make sure she doesn't have any type of tumor. She was agreeable to that plan. We will go ahead and make that referral.             Electronically Signed by: Pearl Walsh-, -Author on February 23, 2021 12:48:48 PM  Electronically Co-signed by: Daniel Lee MD -Reviewer on February 23, 2021 03:58:36 PM
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
## 2021-05-27 ENCOUNTER — CONVERSION ENCOUNTER (OUTPATIENT)
Dept: FAMILY MEDICINE CLINIC | Age: 71
End: 2021-05-27

## 2021-05-28 LAB
ALBUMIN SERPL-MCNC: 4.4 G/DL
ALBUMIN/GLOB SERPL: 1.8 {RATIO}
ALP SERPL-CCNC: 109 IU/L
ALT SERPL-CCNC: 24 IU/L
AST SERPL-CCNC: 24 IU/L
BILIRUB SERPL-MCNC: 0.4 MG/DL
BUN SERPL-MCNC: 16 MG/DL
BUN/CREAT SERPL: 13
CALCIUM SERPL-MCNC: 9.2 MG/DL
CHLORIDE SERPL-SCNC: 105 MMOL/L
CHOLEST SERPL-MCNC: 197 MG/DL
CO2 SERPL-SCNC: 25 MMOL/L
CONV TOTAL PROTEIN: 6.9 G/DL
CREAT UR-MCNC: 1.19 MG/DL
GLOBULIN UR ELPH-MCNC: 2.5 G/DL
GLUCOSE SERPL-MCNC: 105 MG/DL
HDLC SERPL-MCNC: 49 MG/DL
LDLC SERPL CALC-MCNC: 116 MG/DL
POTASSIUM SERPL-SCNC: 4.3 MMOL/L
SODIUM SERPL-SCNC: 142 MMOL/L
TRIGL SERPL-MCNC: 180 MG/DL
TSH SERPL-ACNC: 1.17 UIU/ML
VLDLC SERPL-MCNC: 32 MG/DL

## 2021-06-03 ENCOUNTER — OFFICE VISIT CONVERTED (OUTPATIENT)
Dept: FAMILY MEDICINE CLINIC | Age: 71
End: 2021-06-03
Attending: NURSE PRACTITIONER

## 2021-06-03 ENCOUNTER — HOSPITAL ENCOUNTER (OUTPATIENT)
Dept: OTHER | Facility: HOSPITAL | Age: 71
Discharge: HOME | End: 2021-06-03
Attending: NURSE PRACTITIONER
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
## 2021-06-09 ENCOUNTER — TELEPHONE (OUTPATIENT)
Dept: FAMILY MEDICINE CLINIC | Age: 71
End: 2021-06-09

## 2021-06-09 NOTE — TELEPHONE ENCOUNTER
Caller: Luisa Sepulveda    Relationship: Self    Best call back number: [phone]     What was the call regarding: PATIENT RETURNED CALL TO GET HER XRAY RESULTS AND HUB WAS UNABLE TO WARM TRANSFER    Do you require a callback: YES PLEASE CALL AND ADVISE

## 2021-06-29 RX ORDER — OMEPRAZOLE 40 MG/1
CAPSULE, DELAYED RELEASE ORAL
Qty: 90 CAPSULE | Refills: 1 | Status: SHIPPED | OUTPATIENT
Start: 2021-06-29 | End: 2021-12-13

## 2021-07-01 VITALS
SYSTOLIC BLOOD PRESSURE: 112 MMHG | DIASTOLIC BLOOD PRESSURE: 71 MMHG | HEART RATE: 79 BPM | WEIGHT: 149.8 LBS | TEMPERATURE: 98.9 F | BODY MASS INDEX: 24.08 KG/M2 | HEIGHT: 66 IN

## 2021-07-01 VITALS
HEART RATE: 70 BPM | SYSTOLIC BLOOD PRESSURE: 116 MMHG | TEMPERATURE: 98.1 F | WEIGHT: 146.6 LBS | BODY MASS INDEX: 23.56 KG/M2 | DIASTOLIC BLOOD PRESSURE: 54 MMHG | HEIGHT: 66 IN

## 2021-07-01 VITALS
SYSTOLIC BLOOD PRESSURE: 123 MMHG | WEIGHT: 156.2 LBS | HEIGHT: 66 IN | BODY MASS INDEX: 25.1 KG/M2 | HEART RATE: 67 BPM | TEMPERATURE: 98.4 F | DIASTOLIC BLOOD PRESSURE: 60 MMHG

## 2021-07-01 VITALS
WEIGHT: 152.4 LBS | SYSTOLIC BLOOD PRESSURE: 123 MMHG | TEMPERATURE: 98.1 F | DIASTOLIC BLOOD PRESSURE: 72 MMHG | HEIGHT: 66 IN | BODY MASS INDEX: 24.49 KG/M2 | HEART RATE: 74 BPM

## 2021-07-01 VITALS
HEIGHT: 66 IN | BODY MASS INDEX: 24.3 KG/M2 | WEIGHT: 151.2 LBS | TEMPERATURE: 98.8 F | HEART RATE: 63 BPM | DIASTOLIC BLOOD PRESSURE: 61 MMHG | SYSTOLIC BLOOD PRESSURE: 108 MMHG

## 2021-07-01 VITALS
DIASTOLIC BLOOD PRESSURE: 68 MMHG | BODY MASS INDEX: 24.43 KG/M2 | WEIGHT: 152 LBS | TEMPERATURE: 98.1 F | SYSTOLIC BLOOD PRESSURE: 120 MMHG | HEART RATE: 59 BPM | HEIGHT: 66 IN

## 2021-07-01 VITALS
DIASTOLIC BLOOD PRESSURE: 53 MMHG | HEART RATE: 65 BPM | SYSTOLIC BLOOD PRESSURE: 103 MMHG | BODY MASS INDEX: 24.65 KG/M2 | TEMPERATURE: 98 F | WEIGHT: 153.4 LBS | HEIGHT: 66 IN

## 2021-07-01 VITALS
BODY MASS INDEX: 24.33 KG/M2 | DIASTOLIC BLOOD PRESSURE: 57 MMHG | SYSTOLIC BLOOD PRESSURE: 114 MMHG | TEMPERATURE: 97.7 F | WEIGHT: 151.4 LBS | HEIGHT: 66 IN | HEART RATE: 68 BPM

## 2021-07-01 VITALS
HEIGHT: 66 IN | BODY MASS INDEX: 23.75 KG/M2 | WEIGHT: 147.8 LBS | TEMPERATURE: 98.3 F | HEART RATE: 68 BPM | SYSTOLIC BLOOD PRESSURE: 126 MMHG | DIASTOLIC BLOOD PRESSURE: 45 MMHG

## 2021-07-01 VITALS
WEIGHT: 146.8 LBS | HEIGHT: 66 IN | DIASTOLIC BLOOD PRESSURE: 67 MMHG | BODY MASS INDEX: 23.59 KG/M2 | TEMPERATURE: 98.3 F | HEART RATE: 60 BPM | SYSTOLIC BLOOD PRESSURE: 108 MMHG

## 2021-07-01 VITALS
TEMPERATURE: 97.9 F | BODY MASS INDEX: 23.66 KG/M2 | HEIGHT: 66 IN | WEIGHT: 147.2 LBS | HEART RATE: 76 BPM | DIASTOLIC BLOOD PRESSURE: 65 MMHG | SYSTOLIC BLOOD PRESSURE: 118 MMHG

## 2021-07-01 VITALS
WEIGHT: 148.1 LBS | BODY MASS INDEX: 23.8 KG/M2 | HEART RATE: 70 BPM | DIASTOLIC BLOOD PRESSURE: 70 MMHG | HEIGHT: 66 IN | SYSTOLIC BLOOD PRESSURE: 104 MMHG | TEMPERATURE: 98.3 F

## 2021-07-01 VITALS
BODY MASS INDEX: 24.68 KG/M2 | DIASTOLIC BLOOD PRESSURE: 50 MMHG | TEMPERATURE: 98.3 F | HEART RATE: 67 BPM | HEIGHT: 66 IN | SYSTOLIC BLOOD PRESSURE: 112 MMHG | WEIGHT: 153.6 LBS

## 2021-07-02 VITALS
WEIGHT: 152.2 LBS | HEIGHT: 66 IN | HEART RATE: 100 BPM | SYSTOLIC BLOOD PRESSURE: 126 MMHG | DIASTOLIC BLOOD PRESSURE: 76 MMHG | TEMPERATURE: 97.6 F | BODY MASS INDEX: 24.46 KG/M2

## 2021-07-02 VITALS
WEIGHT: 164 LBS | HEART RATE: 74 BPM | BODY MASS INDEX: 26.36 KG/M2 | HEIGHT: 66 IN | SYSTOLIC BLOOD PRESSURE: 126 MMHG | TEMPERATURE: 97.8 F | DIASTOLIC BLOOD PRESSURE: 55 MMHG

## 2021-07-02 VITALS
BODY MASS INDEX: 24.78 KG/M2 | TEMPERATURE: 97.6 F | DIASTOLIC BLOOD PRESSURE: 63 MMHG | WEIGHT: 154.2 LBS | HEART RATE: 73 BPM | HEIGHT: 66 IN | SYSTOLIC BLOOD PRESSURE: 118 MMHG

## 2021-07-02 VITALS
BODY MASS INDEX: 24.08 KG/M2 | DIASTOLIC BLOOD PRESSURE: 63 MMHG | WEIGHT: 149.8 LBS | HEART RATE: 85 BPM | TEMPERATURE: 96.7 F | SYSTOLIC BLOOD PRESSURE: 113 MMHG | HEIGHT: 66 IN

## 2021-07-02 VITALS
HEIGHT: 66 IN | TEMPERATURE: 96.9 F | DIASTOLIC BLOOD PRESSURE: 79 MMHG | BODY MASS INDEX: 24.85 KG/M2 | SYSTOLIC BLOOD PRESSURE: 120 MMHG | WEIGHT: 154.6 LBS | HEART RATE: 80 BPM

## 2021-07-02 VITALS
HEART RATE: 72 BPM | BODY MASS INDEX: 25.97 KG/M2 | SYSTOLIC BLOOD PRESSURE: 120 MMHG | TEMPERATURE: 97.7 F | HEIGHT: 66 IN | WEIGHT: 161.6 LBS | DIASTOLIC BLOOD PRESSURE: 53 MMHG

## 2021-07-02 VITALS
HEIGHT: 66 IN | DIASTOLIC BLOOD PRESSURE: 65 MMHG | BODY MASS INDEX: 24.91 KG/M2 | SYSTOLIC BLOOD PRESSURE: 126 MMHG | TEMPERATURE: 97.3 F | WEIGHT: 155 LBS | HEART RATE: 87 BPM

## 2021-07-02 VITALS
HEART RATE: 68 BPM | WEIGHT: 163.2 LBS | SYSTOLIC BLOOD PRESSURE: 132 MMHG | TEMPERATURE: 96.1 F | HEIGHT: 66 IN | DIASTOLIC BLOOD PRESSURE: 72 MMHG | BODY MASS INDEX: 26.23 KG/M2

## 2021-07-14 ENCOUNTER — TELEPHONE (OUTPATIENT)
Dept: FAMILY MEDICINE CLINIC | Age: 71
End: 2021-07-14

## 2021-07-14 NOTE — TELEPHONE ENCOUNTER
Caller: Luisa Sepulveda    Relationship: Self    Best call back number: [phone]    Who are you requesting to speak with (clinical staff, provider,  specific staff member): NURSE    Do you know the name of the person who called: ORTEGA    What was the call regarding:     Do you require a callback: YES, PLEASE CALL

## 2021-08-03 RX ORDER — ATORVASTATIN CALCIUM 40 MG/1
TABLET, FILM COATED ORAL
Qty: 90 TABLET | Refills: 1 | Status: SHIPPED | OUTPATIENT
Start: 2021-08-03 | End: 2022-03-07

## 2021-08-04 DIAGNOSIS — M54.40 CHRONIC BILATERAL LOW BACK PAIN WITH SCIATICA, SCIATICA LATERALITY UNSPECIFIED: Primary | ICD-10-CM

## 2021-08-04 DIAGNOSIS — G89.29 CHRONIC BILATERAL LOW BACK PAIN WITH SCIATICA, SCIATICA LATERALITY UNSPECIFIED: Primary | ICD-10-CM

## 2021-08-04 RX ORDER — GABAPENTIN 100 MG/1
CAPSULE ORAL
Qty: 270 CAPSULE | Refills: 0 | Status: SHIPPED | OUTPATIENT
Start: 2021-08-04 | End: 2021-09-29 | Stop reason: SDUPTHER

## 2021-08-05 ENCOUNTER — TELEPHONE (OUTPATIENT)
Dept: FAMILY MEDICINE CLINIC | Age: 71
End: 2021-08-05

## 2021-08-06 ENCOUNTER — TELEPHONE (OUTPATIENT)
Dept: FAMILY MEDICINE CLINIC | Age: 71
End: 2021-08-06

## 2021-08-06 NOTE — TELEPHONE ENCOUNTER
HUB TO ORDER   PT LEFT A MESSAGE IN REGARDS TO NEEDING TO HAVE A STEROID SHOT. SHE WANTS TO SEE IF SHE CAN JUST COME BY AND GET ONE?

## 2021-08-12 ENCOUNTER — TELEPHONE (OUTPATIENT)
Dept: FAMILY MEDICINE CLINIC | Age: 71
End: 2021-08-12

## 2021-08-12 NOTE — TELEPHONE ENCOUNTER
HUB TO READ    CALLED PT TO [facility]. MEDICARE WELLNESS.  IS OVERDUE MORE THEN A YEAR SO IT DOESN'T MATTER WHEN IT IS LOW.

## 2021-08-20 ENCOUNTER — OFFICE VISIT (OUTPATIENT)
Dept: FAMILY MEDICINE CLINIC | Age: 71
End: 2021-08-20

## 2021-08-20 VITALS
OXYGEN SATURATION: 97 % | TEMPERATURE: 98.3 F | SYSTOLIC BLOOD PRESSURE: 106 MMHG | DIASTOLIC BLOOD PRESSURE: 62 MMHG | HEART RATE: 68 BPM | BODY MASS INDEX: 26.07 KG/M2 | HEIGHT: 66 IN | WEIGHT: 162.2 LBS

## 2021-08-20 DIAGNOSIS — E78.2 MIXED HYPERLIPIDEMIA: ICD-10-CM

## 2021-08-20 DIAGNOSIS — M54.41 CHRONIC BILATERAL LOW BACK PAIN WITH RIGHT-SIDED SCIATICA: ICD-10-CM

## 2021-08-20 DIAGNOSIS — G89.29 CHRONIC BILATERAL LOW BACK PAIN WITH RIGHT-SIDED SCIATICA: ICD-10-CM

## 2021-08-20 DIAGNOSIS — F33.1 MAJOR DEPRESSIVE DISORDER, RECURRENT EPISODE, MODERATE DEGREE (HCC): Primary | ICD-10-CM

## 2021-08-20 DIAGNOSIS — M25.551 RIGHT HIP PAIN: ICD-10-CM

## 2021-08-20 DIAGNOSIS — E03.9 ACQUIRED HYPOTHYROIDISM: ICD-10-CM

## 2021-08-20 DIAGNOSIS — I10 ESSENTIAL HYPERTENSION: ICD-10-CM

## 2021-08-20 DIAGNOSIS — F41.9 ANXIETY: ICD-10-CM

## 2021-08-20 PROBLEM — G47.33 OBSTRUCTIVE SLEEP APNEA: Status: ACTIVE | Noted: 2021-08-20

## 2021-08-20 PROBLEM — F51.01 PRIMARY INSOMNIA: Status: ACTIVE | Noted: 2021-08-20

## 2021-08-20 PROBLEM — R05.9 COUGH: Status: ACTIVE | Noted: 2021-08-20

## 2021-08-20 PROBLEM — R49.9 CHANGE IN VOICE: Status: ACTIVE | Noted: 2021-08-20

## 2021-08-20 PROBLEM — K58.2 IRRITABLE BOWEL SYNDROME WITH BOTH CONSTIPATION AND DIARRHEA: Status: ACTIVE | Noted: 2021-08-20

## 2021-08-20 PROBLEM — M16.11 OSTEOARTHRITIS OF RIGHT HIP: Status: RESOLVED | Noted: 2020-05-26 | Resolved: 2021-08-20

## 2021-08-20 PROBLEM — M85.89 OSTEOPENIA OF MULTIPLE SITES: Status: ACTIVE | Noted: 2021-08-20

## 2021-08-20 PROBLEM — K21.9 GERD (GASTROESOPHAGEAL REFLUX DISEASE): Status: ACTIVE | Noted: 2021-08-20

## 2021-08-20 PROBLEM — R49.0 HOARSENESS: Status: ACTIVE | Noted: 2021-08-20

## 2021-08-20 PROBLEM — Z79.899 HIGH RISK MEDICATION USE: Status: ACTIVE | Noted: 2021-08-20

## 2021-08-20 PROBLEM — E78.5 HYPERLIPEMIA: Status: ACTIVE | Noted: 2021-08-20

## 2021-08-20 PROBLEM — R05.9 COUGH: Status: RESOLVED | Noted: 2021-08-20 | Resolved: 2021-08-20

## 2021-08-20 PROCEDURE — 99214 OFFICE O/P EST MOD 30 MIN: CPT | Performed by: FAMILY MEDICINE

## 2021-08-20 RX ORDER — PREDNISONE 10 MG/1
TABLET ORAL
COMMUNITY
Start: 2021-08-09 | End: 2021-09-20

## 2021-08-20 RX ORDER — PAROXETINE 30 MG/1
1 TABLET, FILM COATED ORAL DAILY
COMMUNITY
Start: 2021-08-03 | End: 2021-10-11

## 2021-08-20 RX ORDER — HYDROXYZINE HYDROCHLORIDE 25 MG/1
TABLET, FILM COATED ORAL
COMMUNITY
Start: 2021-08-04 | End: 2021-09-28

## 2021-08-20 RX ORDER — BUSPIRONE HYDROCHLORIDE 5 MG/1
5 TABLET ORAL 2 TIMES DAILY PRN
Qty: 180 TABLET | Refills: 1 | Status: SHIPPED | OUTPATIENT
Start: 2021-08-20 | End: 2022-03-08 | Stop reason: SDUPTHER

## 2021-08-20 RX ORDER — BUSPIRONE HYDROCHLORIDE 5 MG/1
5 TABLET ORAL 2 TIMES DAILY PRN
COMMUNITY
Start: 2021-05-17 | End: 2021-08-20 | Stop reason: SDUPTHER

## 2021-08-20 RX ORDER — MELOXICAM 15 MG/1
TABLET ORAL
COMMUNITY
End: 2021-08-20

## 2021-08-20 RX ORDER — DICLOFENAC SODIUM 75 MG/1
75 TABLET, DELAYED RELEASE ORAL 2 TIMES DAILY
COMMUNITY
Start: 2021-07-04 | End: 2021-08-20 | Stop reason: SDUPTHER

## 2021-08-20 RX ORDER — ONDANSETRON 4 MG/1
TABLET, FILM COATED ORAL
COMMUNITY
Start: 2021-08-03 | End: 2021-09-20

## 2021-08-20 RX ORDER — DICLOFENAC SODIUM 75 MG/1
75 TABLET, DELAYED RELEASE ORAL 2 TIMES DAILY
Qty: 180 TABLET | Refills: 1 | Status: SHIPPED | OUTPATIENT
Start: 2021-08-20 | End: 2022-03-08

## 2021-08-20 RX ORDER — ONDANSETRON HYDROCHLORIDE 8 MG/1
TABLET, FILM COATED ORAL
COMMUNITY
End: 2021-11-10 | Stop reason: SDUPTHER

## 2021-08-20 NOTE — PROGRESS NOTES
"Chief Complaint  Hyperlipidemia (FU) and Hypothyroidism    Subjective          Luisa Sepulveda presents to [facility] today for routine f/u of chronic issues.    She is on paroxetine for depression and insomnia.  She has been doing so well lately.  \"Something calmed me down.\"  She has been taking her buspirone BID.  She was previously on clonazepam, but has been off of that for a while now.    She is sleeping great.  She is doing really well this summer, enjoying time with her family and staying active.      She is on gabapentin and diclofenac for chronic low back pain with lumbar radiculopathy.    She seems to be having problems with her right hip recently.  Her orthopedic surgeon told her that is probably actually coming from the low back because \"you just got a new hip\" but she is not sure that she agrees with that.  She has had a couple falls on the hip lately and feels that it may be coming from there.  He did send her for an repeat MRI L-spine and that showed stable findings from previously.  He is planning to refer her to a pain specialist.        She is on levothyroxine for hypothyroidism.     She denies heat/cold intolerance, changes in hair or skin.        She is on carvedilol for HTN.  BP has been well controlled.  She denies CP, SOB, or palpitations.      She is on atorvastation for cholesterol.  She denies myalgias.        She is on omeprazole for GERD.    She denies recent problems with reflux or indigestion.  She also has prn Zofran.  She follows with Dr. Boo LAWSON.      Current Outpatient Medications:   •  atorvastatin (LIPITOR) 40 MG tablet, TAKE 1 TABLET EVERY DAY, Disp: 90 tablet, Rfl: 1  •  busPIRone (BUSPAR) 5 MG tablet, Take 1 tablet by mouth 2 (Two) Times a Day As Needed (anxiety)., Disp: 180 tablet, Rfl: 1  •  carvedilol (COREG) 3.125 MG tablet, Take 3.125 mg by mouth Every Night., Disp: , Rfl:   •  diclofenac (VOLTAREN) 75 MG EC tablet, Take 1 tablet by mouth 2 " "(Two) Times a Day., Disp: 180 tablet, Rfl: 1  •  gabapentin (NEURONTIN) 100 MG capsule, 2-3 capsules TID prn, Disp: 270 capsule, Rfl: 0  •  hydrOXYzine (ATARAX) 25 MG tablet, , Disp: , Rfl:   •  levothyroxine (SYNTHROID, LEVOTHROID) 50 MCG tablet, Take 50 mcg by mouth Daily., Disp: , Rfl:   •  nystatin-triamcinolone (MYCOLOG II) 673909-1.1 UNIT/GM-% cream, APPLY TO AFFECTED AREA (S) TWICE DAILY (MORNING AND EVENING), Disp: , Rfl:   •  omeprazole (priLOSEC) 40 MG capsule, TAKE 1 CAPSULE EVERY DAY, Disp: 90 capsule, Rfl: 1  •  ondansetron (ZOFRAN) 4 MG tablet, , Disp: , Rfl:   •  ondansetron (Zofran) 8 MG tablet, Zofran 8 mg oral tablet take 1 tablet (8 mg) by oral route 2 times per day   Active, Disp: , Rfl:   •  PARoxetine (PAXIL) 30 MG tablet, Take 1 tablet by mouth Daily., Disp: , Rfl:   •  predniSONE (DELTASONE) 10 MG tablet, TAKE 1 TABLET BY MOUTH TWICE DAILY FOR 10 DAYS THEN TAKE 1 TABLET ONCE DAILY FOR 10 DAYS, Disp: , Rfl:   •  aspirin 325 MG EC tablet, Take 1 tablet by mouth 2 (Two) Times a Day With Meals., Disp: 60 tablet, Rfl: 0  •  clonazePAM (KlonoPIN) 0.5 MG tablet, Take 0.5 mg by mouth Every Other Day., Disp: , Rfl:   •  docusate sodium 100 MG capsule, Take 1 capsule by mouth 2 (Two) Times a Day As Needed for Constipation., Disp: 30 each, Rfl: 1    Allergies:  Codeine, Hydroxyzine, and Oxycodone-acetaminophen      Objective   Vital Signs:   /62   Pulse 68   Temp 98.3 °F (36.8 °C)   Ht 167.6 cm (66\")   Wt 73.6 kg (162 lb 3.2 oz)   SpO2 97%   BMI 26.18 kg/m²     Physical Exam  Vitals reviewed.   Constitutional:       General: She is not in acute distress.     Appearance: Normal appearance. She is well-developed.   HENT:      Head: Normocephalic and atraumatic.      Right Ear: External ear normal.      Left Ear: External ear normal.      Nose: Nose normal.      Mouth/Throat:      Mouth: Mucous membranes are moist.   Eyes:      Extraocular Movements: Extraocular movements intact.      " Conjunctiva/sclera: Conjunctivae normal.      Pupils: Pupils are equal, round, and reactive to light.   Cardiovascular:      Rate and Rhythm: Normal rate and regular rhythm.      Heart sounds: No murmur heard.     Pulmonary:      Effort: Pulmonary effort is normal.      Breath sounds: Normal breath sounds. No wheezing, rhonchi or rales.   Abdominal:      General: Bowel sounds are normal. There is no distension.      Palpations: Abdomen is soft.      Tenderness: There is no abdominal tenderness.   Musculoskeletal:         General: Normal range of motion.   Neurological:      Mental Status: She is alert.   Psychiatric:         Mood and Affect: Affect normal.             Assessment and Plan    Diagnoses and all orders for this visit:    1. Major depressive disorder, recurrent episode, moderate degree (CMS/HCC) (Primary)  Assessment & Plan:  Luisa is doing really well at present on the paroxetine at the 30 mg dose with buspirone twice daily for anxiety.  This combination seems to be working really well for her.  I did question her closely on this point, as she has been known to downplay her symptoms in the past, but she emphasizes that she really is feeling much better.  She is staying active and enjoying time with her family.  Refill sent today on the buspirone.  She does not yet need refills on the Paxil.  Return to clinic in 4 months.      2. Anxiety  Assessment & Plan:  As per depression plan.    Orders:  -     busPIRone (BUSPAR) 5 MG tablet; Take 1 tablet by mouth 2 (Two) Times a Day As Needed (anxiety).  Dispense: 180 tablet; Refill: 1    3. Right hip pain  -     diclofenac (VOLTAREN) 75 MG EC tablet; Take 1 tablet by mouth 2 (Two) Times a Day.  Dispense: 180 tablet; Refill: 1    4. Chronic bilateral low back pain with right-sided sciatica  Assessment & Plan:  Luisa does have chronic low back pain for which she is seen by orthopedics but seems to be having more trouble with the right hip than the back of late.   She really does not feel like this pain in the right lower extremity is similar to the sciatic pain she has had in the past.  She is still taking her diclofenac and gabapentin.  She does need refills on the diclofenac today.  Her orthopedist is planning to send her to a pain specialist [facility].  I have recommended that she may be try instead getting in with Dr. Lucinda Aiken at [facility] pain management as Dr. Aiken has reputation for being a good listener, which I think Luisa would benefit from.  I have given her the name to pass on to her orthopedist.    Orders:  -     diclofenac (VOLTAREN) 75 MG EC tablet; Take 1 tablet by mouth 2 (Two) Times a Day.  Dispense: 180 tablet; Refill: 1    5. Essential hypertension  Assessment & Plan:  BP at goal.  No refills needed.  Labs reviewed and up-to-date.      6. Mixed hyperlipidemia  Assessment & Plan:  Stable.  No refills needed.  Labs reviewed and up-to-date.      7. Acquired hypothyroidism  Assessment & Plan:  Stable.  No refills needed.  Labs reviewed and up-to-date.        Follow Up   Return in about 3 months (around 11/20/2021) for Medicare Wellness scheduled - pt agreed to do this - sq.  Patient was given instructions and counseling regarding her condition or for health maintenance advice. Please see specific information pulled into the AVS if appropriate.

## 2021-08-20 NOTE — ASSESSMENT & PLAN NOTE
Luisa is doing really well at present on the paroxetine at the 30 mg dose with buspirone twice daily for anxiety.  This combination seems to be working really well for her.  I did question her closely on this point, as she has been known to downplay her symptoms in the past, but she emphasizes that she really is feeling much better.  She is staying active and enjoying time with her family.  Refill sent today on the buspirone.  She does not yet need refills on the Paxil.  Return to clinic in 4 months.

## 2021-08-20 NOTE — ASSESSMENT & PLAN NOTE
Luisa does have chronic low back pain for which she is seen by orthopedics but seems to be having more trouble with the right hip than the back of late.  She really does not feel like this pain in the right lower extremity is similar to the sciatic pain she has had in the past.  She is still taking her diclofenac and gabapentin.  She does need refills on the diclofenac today.  Her orthopedist is planning to send her to a pain specialist down at [facility].  I have recommended that she may be try instead getting in with Dr. Lucinda Aiken at [facility] pain management as Dr. Aiken has reputation for being a good listener, which I think Luisa would benefit from.  I have given her the name to pass on to her orthopedist.

## 2021-08-23 DIAGNOSIS — F41.9 ANXIETY: ICD-10-CM

## 2021-08-23 RX ORDER — BUSPIRONE HYDROCHLORIDE 5 MG/1
TABLET ORAL
Qty: 180 TABLET | Refills: 0 | OUTPATIENT
Start: 2021-08-23

## 2021-09-13 ENCOUNTER — TELEPHONE (OUTPATIENT)
Dept: FAMILY MEDICINE CLINIC | Age: 71
End: 2021-09-13

## 2021-09-13 DIAGNOSIS — Z12.31 ENCOUNTER FOR SCREENING MAMMOGRAM FOR MALIGNANT NEOPLASM OF BREAST: ICD-10-CM

## 2021-09-13 DIAGNOSIS — Z78.0 MENOPAUSE: ICD-10-CM

## 2021-09-13 DIAGNOSIS — M85.80 OSTEOPENIA, UNSPECIFIED LOCATION: Primary | ICD-10-CM

## 2021-09-20 ENCOUNTER — HOSPITAL ENCOUNTER (OUTPATIENT)
Dept: GENERAL RADIOLOGY | Facility: HOSPITAL | Age: 71
Discharge: HOME | End: 2021-09-20
Admitting: NURSE PRACTITIONER

## 2021-09-20 ENCOUNTER — OFFICE VISIT (OUTPATIENT)
Dept: FAMILY MEDICINE CLINIC | Age: 71
End: 2021-09-20

## 2021-09-20 VITALS
DIASTOLIC BLOOD PRESSURE: 63 MMHG | SYSTOLIC BLOOD PRESSURE: 116 MMHG | WEIGHT: 158.8 LBS | BODY MASS INDEX: 25.52 KG/M2 | HEART RATE: 93 BPM | TEMPERATURE: 98.8 F | HEIGHT: 66 IN

## 2021-09-20 DIAGNOSIS — M25.562 ACUTE PAIN OF LEFT KNEE: ICD-10-CM

## 2021-09-20 DIAGNOSIS — M25.562 ACUTE PAIN OF LEFT KNEE: Primary | ICD-10-CM

## 2021-09-20 PROCEDURE — 99213 OFFICE O/P EST LOW 20 MIN: CPT | Performed by: NURSE PRACTITIONER

## 2021-09-20 PROCEDURE — 73560 X-RAY EXAM OF KNEE 1 OR 2: CPT

## 2021-09-20 NOTE — PROGRESS NOTES
Please let pt know that xray was negative for acute fx and effusion. There was moderate arthritis noted. Alternate heat and ice. Elevated and rest. Use ace wrap provided. Keep routine f/u with pcp on Friday to f/u. angel Gambino

## 2021-09-20 NOTE — PROGRESS NOTES
"Chief Complaint  Knee Pain (left knee pain after fall on 9/1/21)    Subjective          Luisa Sepulveda presents to [facility] for continued left knee pain since the first of this month.  Patient fell directly onto knee on concrete. She has used arthritis cream and tylenol for symptoms. She states pain did not start until last week. Knee had swollen and was tender at this time. She used heating pad to help with symptoms. Symptoms have improved this am.            Objective   Vital Signs:   /63 (BP Location: Right arm, Patient Position: Sitting, Cuff Size: Large Adult)   Pulse 93   Temp 98.8 °F (37.1 °C) (Oral)   Ht 167.6 cm (65.98\")   Wt 72 kg (158 lb 12.8 oz)   BMI 25.64 kg/m²     Physical Exam  Vitals reviewed.   Constitutional:       Appearance: Normal appearance. She is well-developed and normal weight.   HENT:      Head: Normocephalic and atraumatic.   Eyes:      Conjunctiva/sclera: Conjunctivae normal.      Pupils: Pupils are equal, round, and reactive to light.   Pulmonary:      Effort: Pulmonary effort is normal. No respiratory distress.      Breath sounds: Normal breath sounds.   Musculoskeletal:      Cervical back: Full passive range of motion without pain.      Right lower leg: No edema.      Left lower leg: No edema.      Comments: Left knee tenderness to anterior medial aspect. Obvious effusion not noted. Pt is weight bearing.    Skin:     General: Skin is warm and dry.   Neurological:      Mental Status: She is alert and oriented to person, place, and time.   Psychiatric:         Mood and Affect: Mood and affect normal.         Behavior: Behavior normal.         Thought Content: Thought content normal.         Judgment: Judgment normal.          Result Review :              Assessment and Plan    Diagnoses and all orders for this visit:    1. Acute pain of left knee (Primary)  -     XR Knee 1 or 2 View Left; Future    Will notify patient of x-ray results.  " Tylenol/ibuprofen for discomfort.  Ace wrap.  Elevate.  Rest.  Will sen d to ortho for fx or persistent symptoms.  Patient to notify office with any acute concerns or issues.  Patient verbalizes understanding, agrees with plan of care and has no further questions upon discharge.    Please note that portions of this note were completed with a voice recognition program.    Follow Up    Return if symptoms worsen or fail to improve.  Patient was given instructions and counseling regarding her condition or for health maintenance advice. Please see specific information pulled into the AVS if appropriate.

## 2021-09-24 ENCOUNTER — TELEPHONE (OUTPATIENT)
Dept: FAMILY MEDICINE CLINIC | Age: 71
End: 2021-09-24

## 2021-09-24 NOTE — TELEPHONE ENCOUNTER
Hub to read    Pt had a appt today at 3:45 and accidentally canceled her appt threw her my chart. I can't get pt back in until tues. She said the leg pain is so bad that she needs to have a MRI done she said that's what Katrin antony had talked to her about last time. She would like a call back at [phone].

## 2021-09-28 ENCOUNTER — OFFICE VISIT (OUTPATIENT)
Dept: FAMILY MEDICINE CLINIC | Age: 71
End: 2021-09-28

## 2021-09-28 VITALS
TEMPERATURE: 97.6 F | HEART RATE: 81 BPM | SYSTOLIC BLOOD PRESSURE: 122 MMHG | HEIGHT: 66 IN | DIASTOLIC BLOOD PRESSURE: 47 MMHG | WEIGHT: 158 LBS | BODY MASS INDEX: 25.39 KG/M2
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
## 2021-09-28 DIAGNOSIS — M25.562 ACUTE PAIN OF LEFT KNEE: Primary | ICD-10-CM

## 2021-09-28 PROCEDURE — 99213 OFFICE O/P EST LOW 20 MIN: CPT | Performed by: FAMILY MEDICINE

## 2021-09-28 RX ORDER — HYDROXYZINE HYDROCHLORIDE 25 MG/1
TABLET, FILM COATED ORAL
Qty: 180 TABLET | Refills: 1 | Status: SHIPPED | OUTPATIENT
Start: 2021-09-28

## 2021-09-28 NOTE — PROGRESS NOTES
Chief Complaint  Knee Pain (left X3-4 WEEKS started after falling)    Subjective          Luisa Sepulveda presents to [facility] today for acute complaint of L knee pain after falling 3 weeks ago.  She fell going up some concrete steps (caught her flipflop).  Sheh landed with all her weight on the left knee.  She is still having a lot of pain in the L knee ever since.  Pains are sharp and shoot through it, with a constant dull ache underneath.   Worse with walking or weight bearing although she gets the pains even when she is sitting and resting.  She has tried extra strength Tylenol and diclofenac.  She is alternating heating pad and ice.  The diclofenac seems to help the most out of anything.  She has been wearing an Ace wrap.    She saw Katrin Starks last week for the same issue and she got an XR.  That was negative for fracture.  She just got nervous because the knee was still hurting more than she felt like it should be      Current Outpatient Medications:   •  atorvastatin (LIPITOR) 40 MG tablet, TAKE 1 TABLET EVERY DAY, Disp: 90 tablet, Rfl: 1  •  busPIRone (BUSPAR) 5 MG tablet, Take 1 tablet by mouth 2 (Two) Times a Day As Needed (anxiety)., Disp: 180 tablet, Rfl: 1  •  carvedilol (COREG) 3.125 MG tablet, Take 3.125 mg by mouth Every Night., Disp: , Rfl:   •  diclofenac (VOLTAREN) 75 MG EC tablet, Take 1 tablet by mouth 2 (Two) Times a Day., Disp: 180 tablet, Rfl: 1  •  docusate sodium 100 MG capsule, Take 1 capsule by mouth 2 (Two) Times a Day As Needed for Constipation., Disp: 30 each, Rfl: 1  •  gabapentin (NEURONTIN) 100 MG capsule, 2-3 capsules TID prn, Disp: 270 capsule, Rfl: 0  •  hydrOXYzine (ATARAX) 25 MG tablet, TAKE 1/2 TO 1 TABLET EVERY 6 HOURS DURING THE DAY AND 1 TO 2 TABLETS EVERY NIGHT AS NEEDED FOR ANXIETY, Disp: 180 tablet, Rfl: 1  •  levothyroxine (SYNTHROID, LEVOTHROID) 50 MCG tablet, Take 50 mcg by mouth Daily., Disp: , Rfl:   •  nystatin-triamcinolone  "(MYCOLOG II) 430338-1.1 UNIT/GM-% cream, APPLY TO AFFECTED AREA (S) TWICE DAILY  (MORNING AND EVENING), Disp: 60 g, Rfl: 0  •  omeprazole (priLOSEC) 40 MG capsule, TAKE 1 CAPSULE EVERY DAY, Disp: 90 capsule, Rfl: 1  •  ondansetron (Zofran) 8 MG tablet, Zofran 8 mg oral tablet take 1 tablet (8 mg) by oral route 2 times per day   Active, Disp: , Rfl:   •  PARoxetine (PAXIL) 30 MG tablet, Take 1 tablet by mouth Daily., Disp: , Rfl:     Allergies:  Codeine, Hydroxyzine, and Oxycodone-acetaminophen      Objective   Vital Signs:   /47 (BP Location: Right arm, Patient Position: Sitting)   Pulse 81   Temp 97.6 °F (36.4 °C) (Oral)   Ht 167.6 cm (65.98\")   Wt 71.7 kg (158 lb)   BMI 25.52 kg/m²     Physical Exam  Constitutional:       Appearance: Normal appearance.   HENT:      Head: Normocephalic and atraumatic.   Eyes:      Extraocular Movements: Extraocular movements intact.      Conjunctiva/sclera: Conjunctivae normal.   Pulmonary:      Effort: Pulmonary effort is normal. No respiratory distress.   Musculoskeletal:         General: Tenderness (Minimal over the anterior left knee and medial joint line) present. No swelling or deformity. Normal range of motion.      Right lower leg: No edema.      Left lower leg: No edema.   Skin:     General: Skin is warm and dry.   Neurological:      General: No focal deficit present.      Mental Status: She is alert and oriented to person, place, and time.   Psychiatric:         Mood and Affect: Mood normal.         Behavior: Behavior normal.         Thought Content: Thought content normal.         Judgment: Judgment normal.             Assessment and Plan    Diagnoses and all orders for this visit:    1. Acute pain of left knee (Primary)  Assessment & Plan:  Kendys knee appears to be improving, albeit more slowly than she would like.  She is showing only minimal tenderness on physical exam today.  She should continue use of Tylenol and diclofenac as well as ice and " compression to the knee.  Continue relative rest as long as it is giving her significant pain with weightbearing.  I do not see any utility in repeating the x-ray today.  However she should keep close watch on things and if symptoms do not improve or if they worsen, she should let me know and I will obtain an MRI to look for subacute fracture.  She will let me know how things go.      2. Fall (on) (from) other stairs and steps, initial encounter      Follow Up   Return for as scheduled 12/1/21.  Patient was given instructions and counseling regarding her condition or for health maintenance advice. Please see specific information pulled into the AVS if appropriate.

## 2021-09-28 NOTE — ASSESSMENT & PLAN NOTE
Luisa's knee appears to be improving, albeit more slowly than she would like.  She is showing only minimal tenderness on physical exam today.  She should continue use of Tylenol and diclofenac as well as ice and compression to the knee.  Continue relative rest as long as it is giving her significant pain with weightbearing.  I do not see any utility in repeating the x-ray today.  However she should keep close watch on things and if symptoms do not improve or if they worsen, she should let me know and I will obtain an MRI to look for subacute fracture.  She will let me know how things go.

## 2021-09-29 DIAGNOSIS — M54.40 CHRONIC BILATERAL LOW BACK PAIN WITH SCIATICA, SCIATICA LATERALITY UNSPECIFIED: ICD-10-CM

## 2021-09-29 DIAGNOSIS — G89.29 CHRONIC BILATERAL LOW BACK PAIN WITH SCIATICA, SCIATICA LATERALITY UNSPECIFIED: ICD-10-CM

## 2021-09-29 RX ORDER — CARVEDILOL 3.12 MG/1
3.12 TABLET ORAL NIGHTLY
Qty: 90 TABLET | Refills: 1 | Status: SHIPPED | OUTPATIENT
Start: 2021-09-29 | End: 2022-03-07

## 2021-09-29 RX ORDER — GABAPENTIN 100 MG/1
CAPSULE ORAL
Qty: 270 CAPSULE | Refills: 0 | Status: SHIPPED | OUTPATIENT
Start: 2021-09-29 | End: 2021-12-13 | Stop reason: SDUPTHER

## 2021-10-04 ENCOUNTER — HOSPITAL ENCOUNTER (OUTPATIENT)
Dept: OTHER | Facility: HOSPITAL | Age: 71
Discharge: HOME | End: 2021-10-04

## 2021-10-12 RX ORDER — PAROXETINE 30 MG/1
TABLET, FILM COATED ORAL
Qty: 90 TABLET | Refills: 1 | Status: SHIPPED | OUTPATIENT
Start: 2021-10-12 | End: 2022-03-08 | Stop reason: SDUPTHER

## 2021-10-12 RX ORDER — LEVOTHYROXINE SODIUM 0.05 MG/1
TABLET ORAL
Qty: 90 TABLET | Refills: 1 | Status: SHIPPED | OUTPATIENT
Start: 2021-10-12 | End: 2022-03-07

## 2021-11-10 ENCOUNTER — OFFICE VISIT (OUTPATIENT)
Dept: FAMILY MEDICINE CLINIC | Age: 71
End: 2021-11-10

## 2021-11-10 VITALS
SYSTOLIC BLOOD PRESSURE: 118 MMHG | HEIGHT: 66 IN | TEMPERATURE: 98.3 F | BODY MASS INDEX: 24.98 KG/M2 | HEART RATE: 74 BPM | WEIGHT: 155.4 LBS | DIASTOLIC BLOOD PRESSURE: 78 MMHG

## 2021-11-10 DIAGNOSIS — N95.2 ATROPHIC VAGINITIS: Primary | ICD-10-CM
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
## 2021-11-10 PROBLEM — M25.551 RIGHT HIP PAIN: Status: RESOLVED | Noted: 2020-05-26 | Resolved: 2021-11-10

## 2021-11-10 PROBLEM — R49.9 CHANGE IN VOICE: Status: RESOLVED | Noted: 2021-08-20 | Resolved: 2021-11-10

## 2021-11-10 PROBLEM — M25.562 ACUTE PAIN OF LEFT KNEE: Status: RESOLVED | Noted: 2021-09-28 | Resolved: 2021-11-10

## 2021-11-10 PROCEDURE — 99213 OFFICE O/P EST LOW 20 MIN: CPT | Performed by: FAMILY MEDICINE

## 2021-11-10 RX ORDER — ONDANSETRON 4 MG/1
1 TABLET, FILM COATED ORAL 2 TIMES DAILY PRN
COMMUNITY
Start: 2021-09-27 | End: 2021-12-13

## 2021-11-10 NOTE — PROGRESS NOTES
"Chief Complaint  vaginal irritation (\"It feels like I have bugs crawling in me\")    Subjective          Luisa Sepulveda presents to [facility] today for acute issue of feeling of \"bugs crawling in me.\"  She has been dealing with this for the past 8 months.  She came in to see [facility] back in June and got nystatin-triamcinolone cream and that does work for her, but it is too irritating for long-term use and it starts burning.  Then the itching comes back.  The sensation feels like \"bugs crawling all over.\"  It feels like they come from the inside and the sensation spreads all the way over the perineum.  The gabapentin does not change anything.        Current Outpatient Medications:   •  atorvastatin (LIPITOR) 40 MG tablet, TAKE 1 TABLET EVERY DAY, Disp: 90 tablet, Rfl: 1  •  busPIRone (BUSPAR) 5 MG tablet, Take 1 tablet by mouth 2 (Two) Times a Day As Needed (anxiety)., Disp: 180 tablet, Rfl: 1  •  carvedilol (COREG) 3.125 MG tablet, Take 1 tablet by mouth Every Night., Disp: 90 tablet, Rfl: 1  •  diclofenac (VOLTAREN) 75 MG EC tablet, Take 1 tablet by mouth 2 (Two) Times a Day., Disp: 180 tablet, Rfl: 1  •  gabapentin (NEURONTIN) 100 MG capsule, 2-3 capsules TID prn, Disp: 270 capsule, Rfl: 0  •  hydrOXYzine (ATARAX) 25 MG tablet, TAKE 1/2 TO 1 TABLET EVERY 6 HOURS DURING THE DAY AND 1 TO 2 TABLETS EVERY NIGHT AS NEEDED FOR ANXIETY, Disp: 180 tablet, Rfl: 1  •  levothyroxine (SYNTHROID, LEVOTHROID) 50 MCG tablet, TAKE 1 TABLET EVERY DAY, Disp: 90 tablet, Rfl: 1  •  nystatin-triamcinolone (MYCOLOG II) 466182-7.1 UNIT/GM-% cream, APPLY TO AFFECTED AREA (S) TWICE DAILY  (MORNING AND EVENING), Disp: 60 g, Rfl: 0  •  omeprazole (priLOSEC) 40 MG capsule, TAKE 1 CAPSULE EVERY DAY, Disp: 90 capsule, Rfl: 1  •  ondansetron (ZOFRAN) 4 MG tablet, Take 1 tablet by mouth 2 (Two) Times a Day As Needed., Disp: , Rfl:   •  PARoxetine (PAXIL) 30 MG tablet, TAKE 1 TABLET EVERY DAY, Disp: 90 tablet, Rfl: " "1    Allergies:  Codeine, Hydroxyzine, and Oxycodone-acetaminophen      Objective   Vital Signs:   /78 (BP Location: Right arm, Patient Position: Sitting)   Pulse 74   Temp 98.3 °F (36.8 °C) (Oral)   Ht 167.6 cm (65.98\")   Wt 70.5 kg (155 lb 6.4 oz)   BMI 25.09 kg/m²     Physical Exam  Constitutional:       Appearance: Normal appearance.   HENT:      Head: Normocephalic and atraumatic.   Eyes:      Extraocular Movements: Extraocular movements intact.      Conjunctiva/sclera: Conjunctivae normal.   Pulmonary:      Effort: Pulmonary effort is normal. No respiratory distress.   Genitourinary:     General: Normal vulva.      Vagina: No foreign body. Erythema (mild) present. No vaginal discharge, tenderness, bleeding or lesions.      Comments: + Atrophic vaginitis  Musculoskeletal:         General: Normal range of motion.   Skin:     General: Skin is warm and dry.   Neurological:      General: No focal deficit present.      Mental Status: She is alert and oriented to person, place, and time.   Psychiatric:         Mood and Affect: Mood normal.         Behavior: Behavior normal.         Thought Content: Thought content normal.         Judgment: Judgment normal.             Assessment and Plan    Diagnoses and all orders for this visit:    1. Atrophic vaginitis (Primary)  Assessment & Plan:  Luisa is showing signs of atrophic vaginitis on exam today.  I think this is the etiology of her sensation of crawling bugs.  The cream she got previously from [facility] is probably helping due to the moisture and not so much due to the medications they are in.  I think she would probably benefit from trying Crisco applied 1-2 times daily to the area.  If this is not sufficient to counter her symptoms, however, then when I see her back on 1 December for her wellness visit, we can discuss the possibility of starting her on some vaginal estrogen.  Her mother did have bilateral breast cancer, but no other breast cancer in the " family and no personal history.        Follow Up   Return for as scheduled [facility] 12/1/21.  Patient was given instructions and counseling regarding her condition or for health maintenance advice. Please see specific information pulled into the AVS if appropriate.

## 2021-11-10 NOTE — ASSESSMENT & PLAN NOTE
Luisa is showing signs of atrophic vaginitis on exam today.  I think this is the etiology of her sensation of crawling bugs.  The cream she got previously from [facility] is probably helping due to the moisture and not so much due to the medications they are in.  I think she would probably benefit from trying Crisco applied 1-2 times daily to the area.  If this is not sufficient to counter her symptoms, however, then when I see her back on 1 December for her wellness visit, we can discuss the possibility of starting her on some vaginal estrogen.  Her mother did have bilateral breast cancer, but no other breast cancer in the family and no personal history.

## 2021-12-01 ENCOUNTER — TELEPHONE (OUTPATIENT)
Dept: FAMILY MEDICINE CLINIC | Age: 71
End: 2021-12-01

## 2021-12-01 ENCOUNTER — OFFICE VISIT (OUTPATIENT)
Dept: FAMILY MEDICINE CLINIC | Age: 71
End: 2021-12-01

## 2021-12-01 VITALS
SYSTOLIC BLOOD PRESSURE: 119 MMHG | WEIGHT: 153.4 LBS | HEART RATE: 67 BPM | HEIGHT: 66 IN | BODY MASS INDEX: 24.65 KG/M2 | DIASTOLIC BLOOD PRESSURE: 56 MMHG | TEMPERATURE: 98.5 F

## 2021-12-01 DIAGNOSIS — M25.562 ACUTE PAIN OF LEFT KNEE: ICD-10-CM

## 2021-12-01 DIAGNOSIS — F41.9 ANXIETY: ICD-10-CM

## 2021-12-01 DIAGNOSIS — Z00.00 ANNUAL PHYSICAL EXAM: Primary | ICD-10-CM

## 2021-12-01 DIAGNOSIS — F33.1 MAJOR DEPRESSIVE DISORDER, RECURRENT EPISODE, MODERATE DEGREE (HCC): ICD-10-CM

## 2021-12-01 DIAGNOSIS — E03.9 ACQUIRED HYPOTHYROIDISM: ICD-10-CM

## 2021-12-01 DIAGNOSIS — Z12.11 COLON CANCER SCREENING: ICD-10-CM

## 2021-12-01 DIAGNOSIS — I10 ESSENTIAL HYPERTENSION: ICD-10-CM

## 2021-12-01 PROCEDURE — G0439 PPPS, SUBSEQ VISIT: HCPCS | Performed by: FAMILY MEDICINE

## 2021-12-01 PROCEDURE — 1159F MED LIST DOCD IN RCRD: CPT | Performed by: FAMILY MEDICINE

## 2021-12-01 PROCEDURE — 96160 PT-FOCUSED HLTH RISK ASSMT: CPT | Performed by: FAMILY MEDICINE

## 2021-12-01 PROCEDURE — 99213 OFFICE O/P EST LOW 20 MIN: CPT | Performed by: FAMILY MEDICINE

## 2021-12-01 PROCEDURE — 1170F FXNL STATUS ASSESSED: CPT | Performed by: FAMILY MEDICINE

## 2021-12-01 NOTE — TELEPHONE ENCOUNTER
Caller: Luisa Sepulveda    Relationship: Self    Best call back number: [phone]     What was the call regarding: THE PAT[facility]  PATIENT STATED SHE PLANS TO HAVE THIS DONE TOMORROW.     Do you require a callback: IF NEEDED.

## 2021-12-01 NOTE — ASSESSMENT & PLAN NOTE
She is UTD on colonoscopy, last done 3/2012 and this was normal.  Ten year repeat recommended.  Pap smears are no longer indicated by age and history; s/p hysterectomy.  She is up-to-date on mammogram, last done 10/2021 and this was normal.  She is up-to-date on DEXA, last done 10/2021 and this was normal.  She is up-to-date on Covid (Pfizer x2 - due for shot #3, she is going to get that on 12/13/21).  She is due for Pneumovax, Prevnar, Shingrix, Td and flu; all other vaccines declined.  She is due for routine labs including HTN panel and TSH; ordered.

## 2021-12-01 NOTE — PROGRESS NOTES
The ABCs of the Annual Wellness Visit  Subsequent Medicare Wellness Visit    Chief Complaint   Patient presents with   • Medicare Wellness-subsequent      Subjective    History of Present Illness:  Luisa Sepulveda is a 71 y.o. female who presents for a Subsequent Medicare Wellness Visit.    She is UTD on colonoscopy, last done 3/2012 and this was normal.  Ten year repeat recommended.  Pap smears are no longer indicated by age and history; s/p hysterectomy.  She is up-to-date on mammogram, last done 10/2021 and this was normal.  She is up-to-date on DEXA, last done 10/2021 and this was normal.  She is up-to-date on Covid (Pfizer x2 - due for shot #3, she is going to get that on 12/13/21).  She is due for Pneumovax, Prevnar, Shingrix, Td and flu.  She is due for routine labs including HTN panel and TSH.     She was seen by me on 11/10/2021 for the sensation of crawling bugs vaginally.  She did have evidence of atrophic vaginitis on exam so Crisco was recommended to try to help with the sensation.    She has met someone - Martin!  She met him dancing at the [employer].  She is interested in weaning down off of some of her meds for anxiety and depression.      She is on gabapentin for chronic low back pain.  Last tox 5/2021.  No adverse effects.  Sx are well controlled.  She continues with diclofenac as well.  She is having pain in the L knee.    The following portions of the patient's history were reviewed and   updated as appropriate: allergies, current medications, past family history, past medical history, past social history, past surgical history and problem list.    Compared to one year ago, the patient feels her physical   health is the same.    Compared to one year ago, the patient feels her mental   health is better.    Recent Hospitalizations:  She was not admitted to the hospital during the last year.       Current Medical Providers:  Patient Care Team:  Kari Harris MD as PCP - General (Family  Medicine)    Outpatient Medications Prior to Visit   Medication Sig Dispense Refill   • atorvastatin (LIPITOR) 40 MG tablet TAKE 1 TABLET EVERY DAY 90 tablet 1   • busPIRone (BUSPAR) 5 MG tablet Take 1 tablet by mouth 2 (Two) Times a Day As Needed (anxiety). 180 tablet 1   • carvedilol (COREG) 3.125 MG tablet Take 1 tablet by mouth Every Night. 90 tablet 1   • diclofenac (VOLTAREN) 75 MG EC tablet Take 1 tablet by mouth 2 (Two) Times a Day. 180 tablet 1   • gabapentin (NEURONTIN) 100 MG capsule 2-3 capsules TID prn 270 capsule 0   • hydrOXYzine (ATARAX) 25 MG tablet TAKE 1/2 TO 1 TABLET EVERY 6 HOURS DURING THE DAY AND 1 TO 2 TABLETS EVERY NIGHT AS NEEDED FOR ANXIETY 180 tablet 1   • levothyroxine (SYNTHROID, LEVOTHROID) 50 MCG tablet TAKE 1 TABLET EVERY DAY 90 tablet 1   • nystatin-triamcinolone (MYCOLOG II) 984706-6.1 UNIT/GM-% cream APPLY TO AFFECTED AREA (S) TWICE DAILY  (MORNING AND EVENING) 60 g 0   • omeprazole (priLOSEC) 40 MG capsule TAKE 1 CAPSULE EVERY DAY 90 capsule 1   • ondansetron (ZOFRAN) 4 MG tablet Take 1 tablet by mouth 2 (Two) Times a Day As Needed.     • PARoxetine (PAXIL) 30 MG tablet TAKE 1 TABLET EVERY DAY 90 tablet 1     No facility-administered medications prior to visit.       No opioid medication identified on active medication list. I have reviewed chart for other potential  high risk medication/s and harmful drug interactions in the elderly.          Aspirin is not on active medication list.  Aspirin use is not indicated based on review of current medical condition/s. Risk of harm outweighs potential benefits.  .    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  ACP discussion was held with the patient during this visit. Patient has an advance directive (not in EMR), copy requested.    Review of Systems   Constitutional: Positive for fatigue. Negative for chills and fever.   HENT: Negative for congestion, hearing loss and rhinorrhea.    Eyes: Negative for pain and visual disturbance.   Respiratory: Negative for cough and shortness of breath.    Cardiovascular: Negative for chest pain and palpitations.   Gastrointestinal: Negative for abdominal pain, constipation, diarrhea, nausea and vomiting.   Genitourinary: Negative for difficulty urinating and dysuria.   Musculoskeletal: Positive for arthralgias. Negative for myalgias.   Neurological: Negative for weakness and numbness.   Psychiatric/Behavioral: Negative for dysphoric mood and sleep disturbance. The patient is not nervous/anxious.         Objective    Vitals:    12/01/21 0838   BP: 119/56   BP Location: Left arm   Patient Position: Sitting   Pulse: 67   Temp: 98.5 °F (36.9 °C)   TempSrc: Oral   Weight: 69.6 kg (153 lb 6.4 oz)   Height: 167.6 cm (65.98\")     BMI Readings from Last 1 Encounters:   12/01/21 24.77 kg/m²   BMI is within normal parameters. No follow-up required.    Does the patient have evidence of cognitive impairment? No    Physical Exam  Vitals reviewed.   Constitutional:       General: She is not in acute distress.     Appearance: Normal appearance. She is well-developed.   HENT:      Head: Normocephalic and atraumatic.      Right Ear: External ear normal.      Left Ear: External ear normal.      Nose: Nose normal.      " Mouth/Throat:      Mouth: Mucous membranes are moist.   Eyes:      Extraocular Movements: Extraocular movements intact.      Conjunctiva/sclera: Conjunctivae normal.      Pupils: Pupils are equal, round, and reactive to light.   Cardiovascular:      Rate and Rhythm: Normal rate and regular rhythm.      Heart sounds: No murmur heard.      Pulmonary:      Effort: Pulmonary effort is normal. No respiratory distress.      Breath sounds: Normal breath sounds. No wheezing, rhonchi or rales.   Abdominal:      General: Bowel sounds are normal. There is no distension.      Palpations: Abdomen is soft.      Tenderness: There is no abdominal tenderness.   Musculoskeletal:         General: No swelling, tenderness or deformity. Normal range of motion.      Right lower leg: No edema.      Left lower leg: No edema.   Skin:     General: Skin is warm and dry.   Neurological:      General: No focal deficit present.      Mental Status: She is alert and oriented to person, place, and time.   Psychiatric:         Mood and Affect: Mood and affect normal.         Behavior: Behavior normal.         Thought Content: Thought content normal.         Judgment: Judgment normal.                 HEALTH RISK ASSESSMENT    Smoking Status:  Social History     Tobacco Use   Smoking Status Former Smoker   • Packs/day: 3.00   • Types: Cigarettes   • Quit date:    • Years since quittin.9   Smokeless Tobacco Never Used     Alcohol Consumption:  Social History     Substance and Sexual Activity   Alcohol Use Never     Fall Risk Screen:    [facility] Fall Risk Assessment was completed, and patient is at HIGH risk for falls. Assessment completed on:2021    Depression Screening:  PHQ-2/PHQ-9 Depression Screening 2021   Little interest or pleasure in doing things 0   Feeling down, depressed, or hopeless 0   Total Score 0       Health Habits and Functional and Cognitive Screening:  Functional & Cognitive Status 2021   Do you have difficulty  preparing food and eating? No   Do you have difficulty bathing yourself, getting dressed or grooming yourself? No   Do you have difficulty using the toilet? No   Do you have difficulty moving around from place to place? No   Do you have trouble with steps or getting out of a bed or a chair? No   Current Diet Unhealthy Diet   Dental Exam Up to date   Eye Exam Not up to date   Exercise (times per week) 0 times per week   Current Exercises Include No Regular Exercise   Do you need help using the phone?  No   Are you deaf or do you have serious difficulty hearing?  No   Do you need help with transportation? No   Do you need help shopping? No   Do you need help preparing meals?  No   Do you need help with housework?  No   Do you need help with laundry? No   Do you need help taking your medications? No   Do you need help managing money? No   Do you ever drive or ride in a car without wearing a seat belt? Yes   Have you felt unusual stress, anger or loneliness in the last month? No   Who do you live with? Alone   If you need help, do you have trouble finding someone available to you? No   Have you been bothered in the last four weeks by sexual problems? No   Do you have difficulty concentrating, remembering or making decisions? Yes       Age-appropriate Screening Schedule:  Refer to the list below for future screening recommendations based on patient's age, sex and/or medical conditions. Orders for these recommended tests are listed in the plan section. The patient has been provided with a written plan.    Health Maintenance   Topic Date Due   • ZOSTER VACCINE (1 of 2) 12/01/2021 (Originally 9/[birth year]/2001)   • INFLUENZA VACCINE  11/10/2022 (Originally 8/1/2021)   • TDAP/TD VACCINES (1 - Tdap) 12/01/2022 (Originally 9/13/1969)   • LIPID PANEL  05/27/2022   • MAMMOGRAM  10/04/2023   • DXA SCAN  10/04/2023              Assessment/Plan   CMS Preventative Services Quick Reference  Risk Factors Identified During  Encounter  Immunizations Discussed/Encouraged (specific Immunizations; Influenza, Pneumococcal 23 and COVID19  The above risks/problems have been discussed with the patient.  Follow up actions/plans if indicated are seen below in the Assessment/Plan Section.  Pertinent information has been shared with the patient in the After Visit Summary.    Diagnoses and all orders for this visit:    1. Annual physical exam (Primary)  Assessment & Plan:  She is UTD on colonoscopy, last done 3/2012 and this was normal.  Ten year repeat recommended.  Pap smears are no longer indicated by age and history; s/p hysterectomy.  She is up-to-date on mammogram, last done 10/2021 and this was normal.  She is up-to-date on DEXA, last done 10/2021 and this was normal.  She is up-to-date on Covid (Pfizer x2 - due for shot #3, she is going to get that on 12/13/21).  She is due for Pneumovax, Prevnar, Shingrix, Td and flu; all other vaccines declined.  She is due for routine labs including HTN panel and TSH; ordered.      2. Essential hypertension  -     Lipid Panel; Future  -     Comprehensive Metabolic Panel; Future    3. Acquired hypothyroidism  -     TSH; Future    4. Acute pain of left knee  Assessment & Plan:  Pain in the L knee for the past 8 weeks now, despite conservative management, ever since a fall in which she landed on the L knee.  XR was unremarkable.  She is interested in getting in with Ortho for an evaluation.  She has dancing to do!    Orders:  -     Ambulatory Referral to Orthopedic Surgery    5. Colon cancer screening  -     Ambulatory Referral to Gastroenterology    6. Major depressive disorder, recurrent episode, moderate degree (HCC)  Assessment & Plan:  She is doing much better from a mood perspective.  She is interested in weaning down on some of her mental health meds.  She is currently on paroxetine, hydroxyzine twice daily and buspirone twice daily.  I will have her go ahead and stop the hydroxyzine since she is  taking that scheduled twice daily.  I will see her back in 3 months and at that time we will consider weaning down on the buspirone as well.      7. Anxiety      Follow Up:   Return in about 3 months (around 3/1/2022) for Recheck.     An After Visit Summary and PPPS were made available to the patient.

## 2021-12-01 NOTE — ASSESSMENT & PLAN NOTE
She is doing much better from a mood perspective.  She is interested in weaning down on some of her mental health meds.  She is currently on paroxetine, hydroxyzine twice daily and buspirone twice daily.  I will have her go ahead and stop the hydroxyzine since she is taking that scheduled twice daily.  I will see her back in 3 months and at that time we will consider weaning down on the buspirone as well.

## 2021-12-01 NOTE — ASSESSMENT & PLAN NOTE
Pain in the L knee for the past 8 weeks now, despite conservative management, ever since a fall in which she landed on the L knee.  XR was unremarkable.  She is interested in getting in with Ortho for an evaluation.  She has dancing to do!

## 2021-12-13 DIAGNOSIS — M54.40 CHRONIC BILATERAL LOW BACK PAIN WITH SCIATICA, SCIATICA LATERALITY UNSPECIFIED: ICD-10-CM

## 2021-12-13 DIAGNOSIS — G89.29 CHRONIC BILATERAL LOW BACK PAIN WITH SCIATICA, SCIATICA LATERALITY UNSPECIFIED: ICD-10-CM

## 2021-12-13 RX ORDER — OMEPRAZOLE 40 MG/1
CAPSULE, DELAYED RELEASE ORAL
Qty: 90 CAPSULE | Refills: 1 | Status: SHIPPED | OUTPATIENT
Start: 2021-12-13 | End: 2022-06-15

## 2021-12-13 RX ORDER — ONDANSETRON 4 MG/1
TABLET, FILM COATED ORAL
Qty: 60 TABLET | Refills: 0 | Status: SHIPPED | OUTPATIENT
Start: 2021-12-13 | End: 2022-09-06

## 2021-12-14 RX ORDER — GABAPENTIN 100 MG/1
CAPSULE ORAL
Qty: 270 CAPSULE | Refills: 0 | Status: SHIPPED | OUTPATIENT
Start: 2021-12-14 | End: 2022-03-07 | Stop reason: SDUPTHER

## 2021-12-16 ENCOUNTER — TELEPHONE (OUTPATIENT)
Dept: FAMILY MEDICINE CLINIC | Age: 71
End: 2021-12-16

## 2021-12-16 NOTE — TELEPHONE ENCOUNTER
Caller: Luisa Sepulveda    Relationship: Self    Best call back number: [phone]    What is the best time to reach you: ANYTIME    Who are you requesting to speak with (clinical staff, provider,  specific staff member): NURSE     What was the call regarding: PATIENT IS CALLING WANTING TO PICKUP LAB ORDER IN THE MORNING TO GO TO [facility] AND HAVE THAT DONE.     Do you require a callback: YES, PLEASE LET HER KNOW WHEN THIS IS DONE.            no

## 2021-12-17 ENCOUNTER — OFFICE VISIT (OUTPATIENT)
Dept: FAMILY MEDICINE CLINIC | Age: 71
End: 2021-12-17

## 2021-12-17 VITALS
TEMPERATURE: 98.1 F | HEART RATE: 63 BPM | DIASTOLIC BLOOD PRESSURE: 59 MMHG | WEIGHT: 151 LBS | HEIGHT: 66 IN | SYSTOLIC BLOOD PRESSURE: 122 MMHG | BODY MASS INDEX: 24.27 KG/M2

## 2021-12-17 DIAGNOSIS — R30.0 DYSURIA: Primary | ICD-10-CM

## 2021-12-17 LAB
BACTERIA UR QL AUTO: ABNORMAL /HPF
BILIRUB UR QL STRIP: NEGATIVE
CLARITY UR: ABNORMAL
COLOR UR: YELLOW
GLUCOSE UR STRIP-MCNC: NEGATIVE MG/DL
HGB UR QL STRIP.AUTO: ABNORMAL
KETONES UR QL STRIP: NEGATIVE
LEUKOCYTE ESTERASE UR QL STRIP.AUTO: ABNORMAL
NITRITE UR QL STRIP: NEGATIVE
PH UR STRIP.AUTO: 6 [PH] (ref 5–8)
PROT UR QL STRIP: ABNORMAL
RBC # UR STRIP: ABNORMAL /HPF
REF LAB TEST METHOD: ABNORMAL
SP GR UR STRIP: >=1.03 (ref 1–1.03)
SQUAMOUS #/AREA URNS HPF: ABNORMAL /HPF
UROBILINOGEN UR QL STRIP: ABNORMAL
WBC # UR STRIP: ABNORMAL /HPF

## 2021-12-17 PROCEDURE — 99213 OFFICE O/P EST LOW 20 MIN: CPT | Performed by: FAMILY MEDICINE

## 2021-12-17 PROCEDURE — 87186 SC STD MICRODIL/AGAR DIL: CPT | Performed by: FAMILY MEDICINE

## 2021-12-17 PROCEDURE — 87086 URINE CULTURE/COLONY COUNT: CPT | Performed by: FAMILY MEDICINE

## 2021-12-17 PROCEDURE — 81001 URINALYSIS AUTO W/SCOPE: CPT | Performed by: FAMILY MEDICINE

## 2021-12-17 RX ORDER — PHENAZOPYRIDINE HYDROCHLORIDE 200 MG/1
200 TABLET, FILM COATED ORAL 3 TIMES DAILY PRN
Qty: 6 TABLET | Refills: 0 | Status: SHIPPED | OUTPATIENT
Start: 2021-12-17 | End: 2022-01-20

## 2021-12-17 RX ORDER — SULFAMETHOXAZOLE AND TRIMETHOPRIM 800; 160 MG/1; MG/1
1 TABLET ORAL 2 TIMES DAILY
Qty: 14 TABLET | Refills: 0 | Status: SHIPPED | OUTPATIENT
Start: 2021-12-17 | End: 2022-01-20

## 2021-12-17 NOTE — PROGRESS NOTES
Luisa Sepulveda presents to [facility].    Chief Complaint:  Presumed UTI    Subjective       History of Present Illness:  Luisa is in today for follow-up on what may be a urinary tract infection.  She says that she started having some symptoms this week.  She is having discomfort when she urinates and has some discomfort even when she does not.  She has not had a urinary tract infection for some time.  She denies any fever or chills.  She denies any vaginal discharge.      Review of Systems:  Review of Systems   Constitutional: Negative for chills and fever.   Respiratory: Negative for cough and shortness of breath.    Cardiovascular: Negative for chest pain and palpitations.   Gastrointestinal: Negative for abdominal pain, nausea and vomiting.        Objective   Medical History:  Past Medical History:   • Abnormal findings on diagnostic imaging of other parts of digestive tract   • Acute vaginitis   • Allergy    CODEINE SULFATE,OXYCODONE/ACETAMINOP,HYDROXYZINE HCL   MODERATE   • Anxiety and depression   • Anxiety disorder, unspecified   • AR (allergic rhinitis)   • Chronic pain   • Depression   • Essential (primary) hypertension   • GERD (gastroesophageal reflux disease)   • History of hemoptysis    APPROX 30 YRS AGO   • Hyperlipidemia   • Hypothyroidism   • IBS (irritable bowel syndrome)    WITHOUT DIARRHEA   • Insomnia   • Low back pain   • Obstructive sleep apnea (adult) (pediatric)   • Osteoarthritis   • Osteopenia   • Other disturbances of smell and taste   • Other long term (current) drug therapy   • Other somatoform disorders   • Pain in left shoulder   • Pain in throat   • PONV (postoperative nausea and vomiting)   • Primary insomnia   • Primary osteoarthritis, left hand   • Primary osteoarthritis, right hand   • Right hip pain   • Sciatica, right side   • Sleep apnea    DOES NOT WEAR MACHINE   • Unilateral primary osteoarthritis, right hip   • Unspecified disorder of nose and  nasal sinuses     Past Surgical History:   • BREAST AUGMENTATION   • CHOLECYSTECTOMY   • COLONOSCOPY   • ENDOSCOPY   • HYSTERECTOMY   • JOINT REPLACEMENT    BALJINDER   • LUMBAR DISC SURGERY   • LUMBAR DISCECTOMY    L5-S1   • TONSILLECTOMY   • TOTAL HIP ARTHROPLASTY    Procedure: RIGHT TOTAL HIP ARTHROPLASTY;  Surgeon: Iker Cisneros MD;  Location: [facility] OR;  Service: Orthopedics;  Laterality: Right;      Family History   Problem Relation Age of Onset   • Cancer Other         Breast   • Breast cancer Mother    • Malig Hyperthermia Neg Hx      Social History     Tobacco Use   • Smoking status: Former Smoker     Packs/day: 3.00     Types: Cigarettes     Quit date:      Years since quittin.9   • Smokeless tobacco: Never Used   Substance Use Topics   • Alcohol use: Never       Health Maintenance Due   Topic Date Due   • ZOSTER VACCINE (1 of 2) Never done   • COVID-19 Vaccine (3 - Booster) 10/12/2021        Immunization History   Administered Date(s) Administered   • COVID-19 (PFIZER) 2021, 2021, 2021, 2021   • COVID-19 (UNSPECIFIED) 2021, 2021   • Influenza, Unspecified 2017       Allergies   Allergen Reactions   • Codeine Nausea And Vomiting   • Hydroxyzine Other (See Comments)     Blurred vision   • Oxycodone-Acetaminophen Nausea Only        Medications:  Current Outpatient Medications on File Prior to Visit   Medication Sig   • atorvastatin (LIPITOR) 40 MG tablet TAKE 1 TABLET EVERY DAY   • busPIRone (BUSPAR) 5 MG tablet Take 1 tablet by mouth 2 (Two) Times a Day As Needed (anxiety).   • carvedilol (COREG) 3.125 MG tablet Take 1 tablet by mouth Every Night.   • diclofenac (VOLTAREN) 75 MG EC tablet Take 1 tablet by mouth 2 (Two) Times a Day.   • gabapentin (NEURONTIN) 100 MG capsule 2-3 capsules TID prn   • hydrOXYzine (ATARAX) 25 MG tablet TAKE 1/2 TO 1 TABLET EVERY 6 HOURS DURING THE DAY AND 1 TO 2 TABLETS EVERY NIGHT AS NEEDED FOR ANXIETY   • levothyroxine  "(SYNTHROID, LEVOTHROID) 50 MCG tablet TAKE 1 TABLET EVERY DAY   • nystatin-triamcinolone (MYCOLOG II) 458127-2.1 UNIT/GM-% cream APPLY TO AFFECTED AREA (S) TWICE DAILY  (MORNING AND EVENING)   • omeprazole (priLOSEC) 40 MG capsule TAKE 1 CAPSULE EVERY DAY   • ondansetron (ZOFRAN) 4 MG tablet TAKE 1 TABLET TWICE DAILY AS NEEDED FOR NAUSEA AND VOMITING   • PARoxetine (PAXIL) 30 MG tablet TAKE 1 TABLET EVERY DAY     No current facility-administered medications on file prior to visit.       Vital Signs:   /59 (BP Location: Right arm, Patient Position: Sitting)   Pulse 63   Temp 98.1 °F (36.7 °C) (Oral)   Ht 167.6 cm (65.98\")   Wt 68.5 kg (151 lb)   BMI 24.38 kg/m²       Physical Exam:  Physical Exam  Vitals reviewed.   Constitutional:       General: She is not in acute distress.     Appearance: She is not ill-appearing.   Eyes:      Pupils: Pupils are equal, round, and reactive to light.   Neck:      Comments: No thyromegaly  Cardiovascular:      Rate and Rhythm: Normal rate and regular rhythm.   Pulmonary:      Effort: Pulmonary effort is normal.      Breath sounds: Normal breath sounds.   Abdominal:      General: There is no distension.      Palpations: Abdomen is soft.      Tenderness: There is no abdominal tenderness.   Musculoskeletal:      Cervical back: Neck supple.   Lymphadenopathy:      Cervical: No cervical adenopathy.   Skin:     Findings: No lesion or rash.   Neurological:      Mental Status: She is alert.         Result Review      The following data was reviewed by Ganesh Robles MD on 12/17/2021.  Lab Results   Component Value Date    WBC 5.1 10/19/2020    HGB 12.3 10/19/2020    HCT 38.4 10/19/2020    MCV 88 10/19/2020     10/19/2020     Lab Results   Component Value Date    GLUCOSE 105 (H) 05/27/2021    BUN 16 05/27/2021    CREATININE 1.19 (H) 05/27/2021     05/27/2021    K 4.3 05/27/2021     05/27/2021    CO2 25 05/27/2021    CALCIUM 9.2 05/27/2021    PROTEINTOT " 6.9 05/27/2021    ALBUMIN 4.4 05/27/2021    ALT 24 05/27/2021    AST 24 05/27/2021    ALKPHOS 109 05/27/2021    BILITOT 0.4 05/27/2021    EGFRIFNONA 47 (L) 05/27/2021    GLOB 2.5 05/27/2021    AGRATIO 1.6 07/20/2020    BCR 13 05/27/2021    ANIONGAP 9.6 07/31/2020      Lab Results   Component Value Date    CHLPL 197 05/27/2021    TRIG 180 (H) 05/27/2021    HDL 49 05/27/2021     (H) 05/27/2021     Lab Results   Component Value Date    TSH 1.170 05/27/2021     No results found for: HGBA1C         Assessment and Plan:   Today, we have reviewed her care.  It is likely based on history that she has a urinary tract infection.  We will cover her as noted.  We will be in touch with her after the urine is back and again after the culture has returned.  We will see how things progress.       Diagnoses and all orders for this visit:    1. Dysuria (Primary)  -     Cancel: Urinalysis With Microscopic - Urine, Clean Catch; Future  -     Cancel: Urine Culture - Urine, Urine, Clean Catch; Future  -     Urinalysis With Microscopic - Urine, Clean Catch  -     Urine Culture - Urine, Urine, Clean Catch  -     sulfamethoxazole-trimethoprim (BACTRIM DS,SEPTRA DS) 800-160 MG per tablet; Take 1 tablet by mouth 2 (Two) Times a Day.  Dispense: 14 tablet; Refill: 0  -     phenazopyridine (Pyridium) 200 MG tablet; Take 1 tablet by mouth 3 (Three) Times a Day As Needed for Bladder Spasms.  Dispense: 6 tablet; Refill: 0          Follow Up   Return if symptoms worsen or fail to improve.  Patient was given instructions and counseling regarding her condition or for health maintenance advice. Please see specific information pulled into the AVS if appropriate.

## 2021-12-17 NOTE — PATIENT INSTRUCTIONS
Urinary Tract Infection, Adult    A urinary tract infection (UTI) is an infection of any part of the urinary tract. The urinary tract includes the kidneys, ureters, bladder, and urethra. These organs make, store, and get rid of urine in the body.  Your health care provider may use other names to describe the infection. An upper UTI affects the ureters and kidneys (pyelonephritis). A lower UTI affects the bladder (cystitis) and urethra (urethritis).  What are the causes?  Most urinary tract infections are caused by bacteria in your genital area, around the entrance to your urinary tract (urethra). These bacteria grow and cause inflammation of your urinary tract.  What increases the risk?  You are more likely to develop this condition if:  · You have a urinary catheter that stays in place (indwelling).  · You are not able to control when you urinate or have a bowel movement (you have incontinence).  · You are female and you:  ? Use a spermicide or diaphragm for birth control.  ? Have low estrogen levels.  ? Are pregnant.  · You have certain genes that increase your risk (genetics).  · You are sexually active.  · You take antibiotic medicines.  · You have a condition that causes your flow of urine to slow down, such as:  ? An enlarged prostate, if you are male.  ? Blockage in your urethra (stricture).  ? A kidney stone.  ? A nerve condition that affects your bladder control (neurogenic bladder).  ? Not getting enough to drink, or not urinating often.  · You have certain medical conditions, such as:  ? Diabetes.  ? A weak disease-fighting system (immunesystem).  ? Sickle cell disease.  ? Gout.  ? Spinal cord injury.  What are the signs or symptoms?  Symptoms of this condition include:  · Needing to urinate right away (urgently).  · Frequent urination or passing small amounts of urine frequently.  · Pain or burning with urination.  · Blood in the urine.  · Urine that smells bad or unusual.  · Trouble urinating.  · Cloudy  urine.  · Vaginal discharge, if you are female.  · Pain in the abdomen or the lower back.  You may also have:  · Vomiting or a decreased appetite.  · Confusion.  · Irritability or tiredness.  · A fever.  · Diarrhea.  The first symptom in older adults may be confusion. In some cases, they may not have any symptoms until the infection has worsened.  How is this diagnosed?  This condition is diagnosed based on your medical history and a physical exam. You may also have other tests, including:  · Urine tests.  · Blood tests.  · Tests for sexually transmitted infections (STIs).  If you have had more than one UTI, a cystoscopy or imaging studies may be done to determine the cause of the infections.  How is this treated?  Treatment for this condition includes:  · Antibiotic medicine.  · Over-the-counter medicines to treat discomfort.  · Drinking enough water to stay hydrated.  If you have frequent infections or have other conditions such as a kidney stone, you may need to see a health care provider who specializes in the urinary tract (urologist).  In rare cases, urinary tract infections can cause sepsis. Sepsis is a life-threatening condition that occurs when the body responds to an infection. Sepsis is treated in the hospital with IV antibiotics, fluids, and other medicines.  Follow these instructions at home:    Medicines  · Take over-the-counter and prescription medicines only as told by your health care provider.  · If you were prescribed an antibiotic medicine, take it as told by your health care provider. Do not stop using the antibiotic even if you start to feel better.  General instructions  · Make sure you:  ? Empty your bladder often and completely. Do not hold urine for long periods of time.  ? Empty your bladder after sex.  ? Wipe from front to back after a bowel movement if you are female. Use each tissue one time when you wipe.  · Drink enough fluid to keep your urine pale yellow.  · Keep all follow-up  visits as told by your health care provider. This is important.  Contact a health care provider if:  · Your symptoms do not get better after 1-2 days.  · Your symptoms go away and then return.  Get help right away if you have:  · Severe pain in your back or your lower abdomen.  · A fever.  · Nausea or vomiting.  Summary  · A urinary tract infection (UTI) is an infection of any part of the urinary tract, which includes the kidneys, ureters, bladder, and urethra.  · Most urinary tract infections are caused by bacteria in your genital area, around the entrance to your urinary tract (urethra).  · Treatment for this condition often includes antibiotic medicines.  · If you were prescribed an antibiotic medicine, take it as told by your health care provider. Do not stop using the antibiotic even if you start to feel better.  · Keep all follow-up visits as told by your health care provider. This is important.  This information is not intended to replace advice given to you by your health care provider. Make sure you discuss any questions you have with your health care provider.  Document Revised: 12/05/2019 Document Reviewed: 06/27/2019  [employer] Patient Education © 2021 [employer]

## 2021-12-22 LAB
ALBUMIN SERPL-MCNC: 4.4 G/DL (ref 3.8–4.8)
ALBUMIN/GLOB SERPL: 1.9 {RATIO} (ref 1.2–2.2)
ALP SERPL-CCNC: 114 IU/L (ref 44–121)
ALT SERPL-CCNC: 19 IU/L (ref 0–32)
AST SERPL-CCNC: 28 IU/L (ref 0–40)
BACTERIA UR CULT: ABNORMAL
BACTERIA UR CULT: ABNORMAL
BILIRUB SERPL-MCNC: 0.6 MG/DL (ref 0–1.2)
BUN SERPL-MCNC: 11 MG/DL (ref 8–27)
BUN/CREAT SERPL: 8 (ref 12–28)
CALCIUM SERPL-MCNC: 9.4 MG/DL (ref 8.7–10.3)
CHLORIDE SERPL-SCNC: 103 MMOL/L (ref 96–106)
CHOLEST SERPL-MCNC: 179 MG/DL (ref 100–199)
CO2 SERPL-SCNC: 21 MMOL/L (ref 20–29)
CREAT SERPL-MCNC: 1.39 MG/DL (ref 0.57–1)
GLOBULIN SER CALC-MCNC: 2.3 G/DL (ref 1.5–4.5)
GLUCOSE SERPL-MCNC: 93 MG/DL (ref 65–99)
HDLC SERPL-MCNC: 56 MG/DL
LDLC SERPL CALC-MCNC: 104 MG/DL (ref 0–99)
OTHER ANTIBIOTIC SUSC ISLT: ABNORMAL
POTASSIUM SERPL-SCNC: 4.6 MMOL/L (ref 3.5–5.2)
PROT SERPL-MCNC: 6.7 G/DL (ref 6–8.5)
SODIUM SERPL-SCNC: 139 MMOL/L (ref 134–144)
TRIGL SERPL-MCNC: 104 MG/DL (ref 0–149)
TSH SERPL DL<=0.005 MIU/L-ACNC: 0.26 UIU/ML (ref 0.45–4.5)
VLDLC SERPL CALC-MCNC: 19 MG/DL (ref 5–40)

## 2021-12-23 DIAGNOSIS — R30.0 DYSURIA: Primary | ICD-10-CM

## 2021-12-23 RX ORDER — AMOXICILLIN 500 MG/1
500 CAPSULE ORAL 3 TIMES DAILY
Qty: 30 CAPSULE | Refills: 0 | Status: SHIPPED | OUTPATIENT
Start: 2021-12-23 | End: 2022-03-08

## 2021-12-28 DIAGNOSIS — R94.4 DECREASED CREATININE CLEARANCE: Primary | ICD-10-CM

## 2022-01-03 ENCOUNTER — TELEPHONE (OUTPATIENT)
Dept: FAMILY MEDICINE CLINIC | Age: 72
End: 2022-01-03

## 2022-01-03 NOTE — TELEPHONE ENCOUNTER
Caller: Luisa Sepuvleda    Relationship: Self    Best call back number: [phone]    What orders are you requesting (i.e. lab or imaging): MRI     In what timeframe would the patient need to come in: ASAP     Where will you receive your lab/imaging services: [facility]     Additional notes: PATIENT HAS STATED SHE PREVIOUSLY HAD A X-RAY ON LEFT KNEE , AND WAS ADVISE THE NEXT IS MRI

## 2022-01-05 NOTE — TELEPHONE ENCOUNTER
InPlease call Luisa.  Order for me to be able to order the MRI, we would have to get her in for Physical Therapy first.  The insurance will not approve an MRI order for a general practitioner without the Physical Therapy trial first.  I am happy to put in an order for Physical Therapy if she would like, or if she would prefer to proceed with referral to Orthopedics, the specialist can usually get the  MRI ordered without PT first if nothing is necessary.  Just let me know which way she would like to go.  Thanks, BZALLYSON

## 2022-01-11 ENCOUNTER — TELEPHONE (OUTPATIENT)
Dept: FAMILY MEDICINE CLINIC | Age: 72
End: 2022-01-11

## 2022-01-11 NOTE — TELEPHONE ENCOUNTER
----- Message from Gay Riley LPN sent at 12/27/2021  2:57 PM EST -----  Regarding: FW: 3 wk UA w/ micro    ----- Message -----  From: Marlen Lin MA  Sent: 12/23/2021   9:17 AM EST  To: Janine Britt LPN  Subject: 3 wk UA w/ micro                                 I called PT and informed her of her results and told her to come back in 3 wks for repeat UA.

## 2022-01-12 ENCOUNTER — LAB (OUTPATIENT)
Dept: LAB | Facility: HOSPITAL | Age: 72
End: 2022-01-12

## 2022-01-12 DIAGNOSIS — R94.4 DECREASED CREATININE CLEARANCE: ICD-10-CM

## 2022-01-12 DIAGNOSIS — R30.0 DYSURIA: ICD-10-CM

## 2022-01-12 LAB
BACTERIA UR QL AUTO: ABNORMAL /HPF
BILIRUB UR QL STRIP: NEGATIVE
CLARITY UR: CLEAR
COLOR UR: YELLOW
GLUCOSE UR STRIP-MCNC: NEGATIVE MG/DL
HGB UR QL STRIP.AUTO: NEGATIVE
KETONES UR QL STRIP: NEGATIVE
LEUKOCYTE ESTERASE UR QL STRIP.AUTO: NEGATIVE
NITRITE UR QL STRIP: NEGATIVE
PH UR STRIP.AUTO: 5.5 [PH] (ref 5–8)
PROT UR QL STRIP: NEGATIVE
RBC # UR STRIP: ABNORMAL /HPF
REF LAB TEST METHOD: ABNORMAL
SP GR UR STRIP: 1.02 (ref 1–1.03)
SQUAMOUS #/AREA URNS HPF: ABNORMAL /HPF
UROBILINOGEN UR QL STRIP: NORMAL
WBC # UR STRIP: ABNORMAL /HPF

## 2022-01-12 PROCEDURE — 81001 URINALYSIS AUTO W/SCOPE: CPT

## 2022-01-12 PROCEDURE — 84443 ASSAY THYROID STIM HORMONE: CPT | Performed by: FAMILY MEDICINE

## 2022-01-12 PROCEDURE — 80053 COMPREHEN METABOLIC PANEL: CPT | Performed by: FAMILY MEDICINE

## 2022-01-12 PROCEDURE — 80061 LIPID PANEL: CPT | Performed by: FAMILY MEDICINE

## 2022-01-14 ENCOUNTER — LAB (OUTPATIENT)
Dept: LAB | Facility: HOSPITAL | Age: 72
End: 2022-01-14

## 2022-01-14 ENCOUNTER — TELEPHONE (OUTPATIENT)
Dept: FAMILY MEDICINE CLINIC | Age: 72
End: 2022-01-14

## 2022-01-14 DIAGNOSIS — R30.0 DYSURIA: ICD-10-CM

## 2022-01-14 DIAGNOSIS — R30.0 DYSURIA: Primary | ICD-10-CM

## 2022-01-14 LAB
BACTERIA UR QL AUTO: ABNORMAL /HPF
BILIRUB UR QL STRIP: ABNORMAL
CLARITY UR: ABNORMAL
COLOR UR: ABNORMAL
GLUCOSE UR STRIP-MCNC: ABNORMAL MG/DL
HGB UR QL STRIP.AUTO: ABNORMAL
KETONES UR QL STRIP: ABNORMAL
LEUKOCYTE ESTERASE UR QL STRIP.AUTO: ABNORMAL
NITRITE UR QL STRIP: ABNORMAL
PH UR STRIP.AUTO: ABNORMAL [PH]
PROT UR QL STRIP: ABNORMAL
RBC # UR STRIP: ABNORMAL /HPF
REF LAB TEST METHOD: ABNORMAL
SP GR UR STRIP: 1.01 (ref 1–1.03)
SQUAMOUS #/AREA URNS HPF: ABNORMAL /HPF
UROBILINOGEN UR QL STRIP: ABNORMAL
WBC # UR STRIP: ABNORMAL /HPF

## 2022-01-14 PROCEDURE — 81001 URINALYSIS AUTO W/SCOPE: CPT

## 2022-01-14 PROCEDURE — 87086 URINE CULTURE/COLONY COUNT: CPT

## 2022-01-14 RX ORDER — NITROFURANTOIN 25; 75 MG/1; MG/1
100 CAPSULE ORAL 2 TIMES DAILY
Qty: 20 CAPSULE | Refills: 0 | Status: SHIPPED | OUTPATIENT
Start: 2022-01-14 | End: 2022-03-08

## 2022-01-14 NOTE — TELEPHONE ENCOUNTER
I have placed orders for repeat urinalysis and urine culture.  I would recommend a new collection given the onset of symptoms in the last day.  Thanks.

## 2022-01-15 LAB — BACTERIA SPEC AEROBE CULT: NO GROWTH

## 2022-01-20 ENCOUNTER — TELEPHONE (OUTPATIENT)
Dept: FAMILY MEDICINE CLINIC | Age: 72
End: 2022-01-20

## 2022-01-20 DIAGNOSIS — N39.0 RECURRENT UTI: Primary | ICD-10-CM

## 2022-01-20 NOTE — TELEPHONE ENCOUNTER
Pt called today stating she had finished her abx, but was still having the burning with urination.

## 2022-01-20 NOTE — TELEPHONE ENCOUNTER
Noted. I believe I sent 10 days on the nitrofurantoin on 1/14/2022. It would seem unusual that she has finished it already. Please clarify. If she has not completed the antibiotic. I would prefer that she wait until she has done so to repeat testing.    Assuming that she has indeed finished the antibiotic, then I would recommend she stop back in for urinalysis tomorrow. Also, this situation is unusual, I am going to include Dr. Harris in on this. I have placed orders for urology referral and an ultrasound of the kidneys as a precaution. I am not sure what is causing the ongoing problem. Thanks.

## 2022-01-20 NOTE — TELEPHONE ENCOUNTER
Pt inf, states she has abx to last until Monday, she is just worried, states she thinks her kidneys are shutting down. Will come in next week for recheck

## 2022-01-20 NOTE — TELEPHONE ENCOUNTER
Called and spoke with Luisa.  She is on the Macrobid and so far is feeling a bit better.  I would like her to go ahead and finish that.  I asked her whether she and Martin have been intimate in their new relationship, and she states that they have.  We discussed the option of perhaps checking for STIs such as gonorrhea and chlamydia if she were not to have good resolution of her symptoms with the Macrobid again.  She is willing to do that if needed.  We also discussed the possibilities of sending her to Urology and/or ultrasound of the kidneys, both of which would be very reasonable options if she continues to have problems.  She will finish up the Macrobid and then let me know how she is doing and if we need further work-up at that time.  Thanks, CHRISTEN

## 2022-01-31 ENCOUNTER — HOSPITAL ENCOUNTER (OUTPATIENT)
Dept: ULTRASOUND IMAGING | Facility: HOSPITAL | Age: 72
Discharge: HOME | End: 2022-01-31
Admitting: FAMILY MEDICINE

## 2022-01-31 DIAGNOSIS — N39.0 RECURRENT UTI: ICD-10-CM

## 2022-01-31 PROCEDURE — 76775 US EXAM ABDO BACK WALL LIM: CPT

## 2022-03-06 DIAGNOSIS — M25.551 RIGHT HIP PAIN: ICD-10-CM

## 2022-03-06 DIAGNOSIS — G89.29 CHRONIC BILATERAL LOW BACK PAIN WITH RIGHT-SIDED SCIATICA: ICD-10-CM

## 2022-03-06 DIAGNOSIS — F41.9 ANXIETY: ICD-10-CM

## 2022-03-06 DIAGNOSIS — M54.41 CHRONIC BILATERAL LOW BACK PAIN WITH RIGHT-SIDED SCIATICA: ICD-10-CM

## 2022-03-07 DIAGNOSIS — M54.40 CHRONIC BILATERAL LOW BACK PAIN WITH SCIATICA, SCIATICA LATERALITY UNSPECIFIED: ICD-10-CM

## 2022-03-07 DIAGNOSIS — G89.29 CHRONIC BILATERAL LOW BACK PAIN WITH SCIATICA, SCIATICA LATERALITY UNSPECIFIED: ICD-10-CM

## 2022-03-07 RX ORDER — LEVOTHYROXINE SODIUM 0.05 MG/1
TABLET ORAL
Qty: 90 TABLET | Refills: 1 | Status: SHIPPED | OUTPATIENT
Start: 2022-03-07 | End: 2022-08-22

## 2022-03-07 RX ORDER — CARVEDILOL 3.12 MG/1
3.12 TABLET ORAL NIGHTLY
Qty: 90 TABLET | Refills: 1 | Status: SHIPPED | OUTPATIENT
Start: 2022-03-07 | End: 2022-08-22

## 2022-03-07 RX ORDER — ATORVASTATIN CALCIUM 40 MG/1
TABLET, FILM COATED ORAL
Qty: 90 TABLET | Refills: 1 | Status: SHIPPED | OUTPATIENT
Start: 2022-03-07 | End: 2022-08-22

## 2022-03-08 ENCOUNTER — OFFICE VISIT (OUTPATIENT)
Dept: FAMILY MEDICINE CLINIC | Age: 72
End: 2022-03-08

## 2022-03-08 VITALS
DIASTOLIC BLOOD PRESSURE: 81 MMHG | SYSTOLIC BLOOD PRESSURE: 102 MMHG | BODY MASS INDEX: 24.43 KG/M2 | HEIGHT: 66 IN | WEIGHT: 152 LBS | TEMPERATURE: 98.3 F | HEART RATE: 73 BPM

## 2022-03-08 DIAGNOSIS — E78.2 MIXED HYPERLIPIDEMIA: ICD-10-CM

## 2022-03-08 DIAGNOSIS — N63.15 BREAST LUMP ON RIGHT SIDE AT 3 O'CLOCK POSITION: Primary | ICD-10-CM

## 2022-03-08 DIAGNOSIS — I10 ESSENTIAL HYPERTENSION: ICD-10-CM

## 2022-03-08 DIAGNOSIS — E03.9 ACQUIRED HYPOTHYROIDISM: ICD-10-CM

## 2022-03-08 DIAGNOSIS — F41.9 ANXIETY: ICD-10-CM

## 2022-03-08 DIAGNOSIS — F33.1 MAJOR DEPRESSIVE DISORDER, RECURRENT EPISODE, MODERATE DEGREE: ICD-10-CM

## 2022-03-08 PROBLEM — M25.562 ACUTE PAIN OF LEFT KNEE: Status: RESOLVED | Noted: 2021-09-28 | Resolved: 2022-03-08

## 2022-03-08 PROBLEM — Z00.00 ANNUAL PHYSICAL EXAM: Status: RESOLVED | Noted: 2021-12-01 | Resolved: 2022-03-08

## 2022-03-08 PROCEDURE — 99214 OFFICE O/P EST MOD 30 MIN: CPT | Performed by: FAMILY MEDICINE

## 2022-03-08 RX ORDER — BUSPIRONE HYDROCHLORIDE 5 MG/1
TABLET ORAL
Qty: 180 TABLET | Refills: 1 | OUTPATIENT
Start: 2022-03-08

## 2022-03-08 RX ORDER — GABAPENTIN 100 MG/1
CAPSULE ORAL
Qty: 270 CAPSULE | Refills: 0 | Status: SHIPPED | OUTPATIENT
Start: 2022-03-08 | End: 2022-06-15

## 2022-03-08 RX ORDER — BUSPIRONE HYDROCHLORIDE 5 MG/1
5 TABLET ORAL 2 TIMES DAILY PRN
Qty: 180 TABLET | Refills: 1 | Status: SHIPPED | OUTPATIENT
Start: 2022-03-08 | End: 2022-08-22

## 2022-03-08 RX ORDER — DICLOFENAC SODIUM 75 MG/1
TABLET, DELAYED RELEASE ORAL
Qty: 180 TABLET | Refills: 1 | Status: SHIPPED | OUTPATIENT
Start: 2022-03-08 | End: 2022-03-25

## 2022-03-08 RX ORDER — PAROXETINE 30 MG/1
TABLET, FILM COATED ORAL
Qty: 90 TABLET | Refills: 1 | OUTPATIENT
Start: 2022-03-08

## 2022-03-08 RX ORDER — PAROXETINE 30 MG/1
30 TABLET, FILM COATED ORAL DAILY
Qty: 90 TABLET | Refills: 1 | Status: SHIPPED | OUTPATIENT
Start: 2022-03-08 | End: 2022-08-22

## 2022-03-08 NOTE — ASSESSMENT & PLAN NOTE
She has a history of fibrocystic breast tissue but has a knot on the right medial breast that has been changing recently.  Last mammogram was done October 2021 and was normal at that time.  Sending her for diagnostic mammo with as needed ultrasound.

## 2022-03-08 NOTE — ASSESSMENT & PLAN NOTE
Luisa is doing great.  She is interested in getting down on some of her medications.  We will plan to decrease her Paxil to 15 mg from 30 (one half tab daily).  If she has any problems going down, she can return to 30 mg dose until she next gets back into see me.  Continue buspirone for now on an as-needed basis.  Refill sent.

## 2022-03-08 NOTE — PROGRESS NOTES
Chief Complaint  Depression (3 month f/u)    Subjective          Luisa Sepulveda presents to [facility] today for follow-up on routine issues.    She is feeling great!      She has a hard knot on the R breast.  No tenderness.  Her mammo is UTD, last done 10/2022    She has been having some recurrent UTIs, most recently treated back at the end of January with a course of Macrobid by Dr. Robles.    She is on Paxil and Buspar for depression and insomnia.  She has used clonazepam in the past but has not taken that in over a year now.      She is on gabapentin and diclofenac for history of chronic low back pain with lumbar radiculopathy.       She is on levothyroxine for hypothyroidism.   No problems with cold/heat intolerance or changes in hair or skin.        She is on Coreg for hypertension.  Her blood pressure has been well controlled.   No problems with chest pain, shortness of breath, or palpitations.      She is on atorvastation for  upper lipidemia.   No problems with myalgias.        She is on Prilosec for GERD.  No heartburn or reflux.  She also has as needed Zofran.  She follows with Dr. Boo LAWSON.      Current Outpatient Medications:   •  atorvastatin (LIPITOR) 40 MG tablet, TAKE 1 TABLET EVERY DAY, Disp: 90 tablet, Rfl: 1  •  busPIRone (BUSPAR) 5 MG tablet, Take 1 tablet by mouth 2 (Two) Times a Day As Needed (anxiety)., Disp: 180 tablet, Rfl: 1  •  carvedilol (COREG) 3.125 MG tablet, TAKE 1 TABLET BY MOUTH EVERY NIGHT., Disp: 90 tablet, Rfl: 1  •  diclofenac (VOLTAREN) 75 MG EC tablet, Take 1 tablet by mouth 2 (Two) Times a Day., Disp: 180 tablet, Rfl: 1  •  gabapentin (NEURONTIN) 100 MG capsule, 2-3 capsules TID prn, Disp: 270 capsule, Rfl: 0  •  hydrOXYzine (ATARAX) 25 MG tablet, TAKE 1/2 TO 1 TABLET EVERY 6 HOURS DURING THE DAY AND 1 TO 2 TABLETS EVERY NIGHT AS NEEDED FOR ANXIETY, Disp: 180 tablet, Rfl: 1  •  levothyroxine (SYNTHROID, LEVOTHROID) 50 MCG tablet, TAKE 1 TABLET  "EVERY DAY, Disp: 90 tablet, Rfl: 1  •  omeprazole (priLOSEC) 40 MG capsule, TAKE 1 CAPSULE EVERY DAY, Disp: 90 capsule, Rfl: 1  •  ondansetron (ZOFRAN) 4 MG tablet, TAKE 1 TABLET TWICE DAILY AS NEEDED FOR NAUSEA AND VOMITING, Disp: 60 tablet, Rfl: 0  •  PARoxetine (PAXIL) 30 MG tablet, Take 1 tablet by mouth Daily., Disp: 90 tablet, Rfl: 1    Allergies:  Codeine, Hydroxyzine, and Oxycodone-acetaminophen      Objective   Vital Signs:   Vitals:    03/08/22 0813   BP: 102/81   BP Location: Left arm   Patient Position: Sitting   Cuff Size: Large Adult   Pulse: 73   Temp: 98.3 °F (36.8 °C)   TempSrc: Oral   Weight: 68.9 kg (152 lb)   Height: 167.6 cm (65.98\")       Physical Exam  Vitals reviewed. Exam conducted with a chaperone present.   Constitutional:       General: She is not in acute distress.     Appearance: Normal appearance. She is well-developed.   HENT:      Head: Normocephalic and atraumatic.      Right Ear: External ear normal.      Left Ear: External ear normal.   Eyes:      Extraocular Movements: Extraocular movements intact.      Conjunctiva/sclera: Conjunctivae normal.      Pupils: Pupils are equal, round, and reactive to light.   Cardiovascular:      Rate and Rhythm: Normal rate and regular rhythm.      Heart sounds: No murmur heard.  Pulmonary:      Effort: Pulmonary effort is normal.      Breath sounds: Normal breath sounds. No wheezing, rhonchi or rales.   Chest:   Breasts:      Right: Mass (3:00, egg sized, nontender, mobile) present. No inverted nipple, nipple discharge, skin change, tenderness or axillary adenopathy.      Left: Normal. No inverted nipple, mass, nipple discharge, skin change, tenderness or axillary adenopathy.       Abdominal:      General: Bowel sounds are normal. There is no distension.      Palpations: Abdomen is soft.      Tenderness: There is no abdominal tenderness.   Musculoskeletal:         General: Normal range of motion.   Lymphadenopathy:      Upper Body:      Right upper " body: No axillary adenopathy.      Left upper body: No axillary adenopathy.   Neurological:      Mental Status: She is alert.   Psychiatric:         Mood and Affect: Affect normal.        Lab Results   Component Value Date    TSH 1.960 01/12/2022       Lab Results   Component Value Date    GLUCOSE 86 01/12/2022    BUN 17 01/12/2022    CREATININE 0.96 01/12/2022    EGFRIFNONA 60 01/12/2022    EGFRIFAFRI 69 01/12/2022    BCR 18 01/12/2022    K 4.6 01/12/2022    CO2 21 01/12/2022    CALCIUM 9.4 01/12/2022    PROTENTOTREF 6.6 01/12/2022    ALBUMIN 4.4 01/12/2022    LABIL2 2.0 01/12/2022    AST 25 01/12/2022    ALT 29 01/12/2022     Lab Results   Component Value Date    CHLPL 165 01/12/2022    TRIG 166 (H) 01/12/2022    HDL 54 01/12/2022    LDL 83 01/12/2022            Assessment and Plan    Diagnoses and all orders for this visit:    1. Breast lump on right side at 3 o'clock position (Primary)  Assessment & Plan:  She has a history of fibrocystic breast tissue but has a knot on the right medial breast that has been changing recently.  Last mammogram was done October 2021 and was normal at that time.  Sending her for diagnostic mammo with as needed ultrasound.    Orders:  -     Mammo Diagnostic Digital Tomosynthesis Right With CAD; Future  -     US Breast Right Complete; Future    2. Major depressive disorder, recurrent episode, moderate degree (HCC)  Assessment & Plan:  Luisa is doing great.  She is interested in getting down on some of her medications.  We will plan to decrease her Paxil to 15 mg from 30 (one half tab daily).  If she has any problems going down, she can return to 30 mg dose until she next gets back into see me.  Continue buspirone for now on an as-needed basis.  Refill sent.    Orders:  -     PARoxetine (PAXIL) 30 MG tablet; Take 1 tablet by mouth Daily.  Dispense: 90 tablet; Refill: 1    3. Anxiety  Assessment & Plan:  As per depression plan.    Orders:  -     busPIRone (BUSPAR) 5 MG tablet; Take 1  tablet by mouth 2 (Two) Times a Day As Needed (anxiety).  Dispense: 180 tablet; Refill: 1    4. Acquired hypothyroidism  Assessment & Plan:  Stable on levothyroxine 50 mcg daily.  No refills needed.  Labs reviewed and up-to-date.      5. Essential hypertension  Assessment & Plan:  Stable on carvedilol 3.125 mg twice daily.  Blood pressure at goal.  No refills needed.  Labs reviewed and up-to-date.      6. Mixed hyperlipidemia  Assessment & Plan:  Stable on atorvastatin 40 mg daily.  No refills needed.  Labs reviewed and up-to-date.        Follow Up   Return in about 3 months (around 6/8/2022) for Recheck.  Patient was given instructions and counseling regarding her condition or for health maintenance advice. Please see specific information pulled into the AVS if appropriate.

## 2022-03-08 NOTE — ASSESSMENT & PLAN NOTE
Stable on carvedilol 3.125 mg twice daily.  Blood pressure at goal.  No refills needed.  Labs reviewed and up-to-date.

## 2022-03-17 ENCOUNTER — HOSPITAL ENCOUNTER (OUTPATIENT)
Dept: OTHER | Facility: HOSPITAL | Age: 72
Discharge: HOME | End: 2022-03-17

## 2022-03-17 DIAGNOSIS — N63.15 BREAST LUMP ON RIGHT SIDE AT 3 O'CLOCK POSITION: ICD-10-CM

## 2022-03-23 ENCOUNTER — TELEPHONE (OUTPATIENT)
Dept: FAMILY MEDICINE CLINIC | Age: 72
End: 2022-03-23

## 2022-03-23 NOTE — TELEPHONE ENCOUNTER
Caller: Luisa Sepulveda    Relationship: Self    Best call back number: [phone]    Who are you requesting to speak with (clinical staff, provider,  specific staff member): MEDICAL STAFF    What was the call regarding: PATIENT WOULD LIKE TO KNOW IF HER ARTHRITIS MEDICATION CAN BE INCREASED OR CHANGED. SHE IS UNSURE OF THE NAME OF THE MEDICATION. SHE EXPLAINED THAT HER HANDS AND KNEES ARE STILL HURTING AND THE MEDICATION IS NOT HELPING.     [facility] - [place], [place] - [address] - [phone] [facility] [phone] FX

## 2022-03-23 NOTE — TELEPHONE ENCOUNTER
Has she ever tried meloxicam before?  We could try switching to that and see if a different medication in that family might give her a better result.  Thanks, BZM

## 2022-03-23 NOTE — TELEPHONE ENCOUNTER
Let's get Luisa back in for an appointment so I can go over her next options with her more fully.  Thanks, CHRISTEN

## 2022-03-25 ENCOUNTER — OFFICE VISIT (OUTPATIENT)
Dept: FAMILY MEDICINE CLINIC | Age: 72
End: 2022-03-25

## 2022-03-25 VITALS
HEIGHT: 66 IN | DIASTOLIC BLOOD PRESSURE: 49 MMHG | BODY MASS INDEX: 24.59 KG/M2 | HEART RATE: 66 BPM | SYSTOLIC BLOOD PRESSURE: 134 MMHG | WEIGHT: 153 LBS

## 2022-03-25 DIAGNOSIS — M17.0 PRIMARY OSTEOARTHRITIS OF BOTH KNEES: Primary | ICD-10-CM

## 2022-03-25 DIAGNOSIS — M19.042 PRIMARY OSTEOARTHRITIS OF HANDS, BILATERAL: ICD-10-CM

## 2022-03-25 DIAGNOSIS — M19.041 PRIMARY OSTEOARTHRITIS OF HANDS, BILATERAL: ICD-10-CM

## 2022-03-25 PROCEDURE — 99214 OFFICE O/P EST MOD 30 MIN: CPT | Performed by: FAMILY MEDICINE

## 2022-03-25 RX ORDER — MELOXICAM 15 MG/1
15 TABLET ORAL DAILY PRN
Qty: 90 TABLET | Refills: 0 | Status: SHIPPED | OUTPATIENT
Start: 2022-03-25 | End: 2022-06-15

## 2022-03-25 NOTE — ASSESSMENT & PLAN NOTE
Arthritis of the bilateral knees and hands.  She has been treated by Orthopedics already for the pain in the knees with injections.  Dr. Smith has already told her that if she does not get relief with these, he will plan to talk with her about knee replacement.  She is willing to have that discussion with him.  As far as the hands go, she is more interested in mind to make medication adjustments at this time as opposed to going to see a hand specialist.  We discussed today her options as far as this goes.  Unfortunately, she is maxed out on the dose of the diclofenac as well as the meloxicam she was taking before.  However, it has been a while since she is on the meloxicam, so she might notice a receding of the original plateau effect that she experienced.  I will send some of that in for her to try.  If it is not effective, then we may try switching her to etodolac and giving her a trial of that instead.

## 2022-03-25 NOTE — PROGRESS NOTES
Chief Complaint  Arthritis (Follow up)    Subjective          Luisa Sepulveda presents to [facility] today for arthritis pain.  She continues to have a lot of pain in the hands and knees.  She has been on diclofenac and meloxicam previously without much relief.  She is following with [facility] and has had Synvisc injections in the knees.  Unfortunately, she is not getting much relief with these.    She has not had imaging done of the hands previously.  The diclofenac was working at first, but now seems to have worn off.  The pain in the hands is mostly located in the fingers.  They feel very stiff in the mornings.      Current Outpatient Medications:   •  atorvastatin (LIPITOR) 40 MG tablet, TAKE 1 TABLET EVERY DAY, Disp: 90 tablet, Rfl: 1  •  busPIRone (BUSPAR) 5 MG tablet, Take 1 tablet by mouth 2 (Two) Times a Day As Needed (anxiety)., Disp: 180 tablet, Rfl: 1  •  carvedilol (COREG) 3.125 MG tablet, TAKE 1 TABLET BY MOUTH EVERY NIGHT., Disp: 90 tablet, Rfl: 1  •  gabapentin (NEURONTIN) 100 MG capsule, 2-3 capsules TID prn, Disp: 270 capsule, Rfl: 0  •  hydrOXYzine (ATARAX) 25 MG tablet, TAKE 1/2 TO 1 TABLET EVERY 6 HOURS DURING THE DAY AND 1 TO 2 TABLETS EVERY NIGHT AS NEEDED FOR ANXIETY, Disp: 180 tablet, Rfl: 1  •  levothyroxine (SYNTHROID, LEVOTHROID) 50 MCG tablet, TAKE 1 TABLET EVERY DAY, Disp: 90 tablet, Rfl: 1  •  omeprazole (priLOSEC) 40 MG capsule, TAKE 1 CAPSULE EVERY DAY, Disp: 90 capsule, Rfl: 1  •  ondansetron (ZOFRAN) 4 MG tablet, TAKE 1 TABLET TWICE DAILY AS NEEDED FOR NAUSEA AND VOMITING, Disp: 60 tablet, Rfl: 0  •  PARoxetine (PAXIL) 30 MG tablet, Take 1 tablet by mouth Daily., Disp: 90 tablet, Rfl: 1  •  meloxicam (MOBIC) 15 MG tablet, Take 1 tablet by mouth Daily As Needed for Moderate Pain ., Disp: 90 tablet, Rfl: 0    Allergies:  Codeine, Hydroxyzine, and Oxycodone-acetaminophen      Objective   Vital Signs:   Vitals:    03/25/22 1424  "  BP: 134/49   BP Location: Right arm   Patient Position: Sitting   Pulse: 66   Weight: 69.4 kg (153 lb)   Height: 167.6 cm (65.98\")       Physical Exam        Assessment and Plan    Diagnoses and all orders for this visit:    1. Primary osteoarthritis of both knees (Primary)  Assessment & Plan:  Arthritis of the bilateral knees and hands.  She has been treated by Orthopedics already for the pain in the knees with injections.  Dr. Smith has already told her that if she does not get relief with these, he will plan to talk with her about knee replacement.  She is willing to have that discussion with him.  As far as the hands go, she is more interested in mind to make medication adjustments at this time as opposed to going to see a hand specialist.  We discussed today her options as far as this goes.  Unfortunately, she is maxed out on the dose of the diclofenac as well as the meloxicam she was taking before.  However, it has been a while since she is on the meloxicam, so she might notice a receding of the original plateau effect that she experienced.  I will send some of that in for her to try.  If it is not effective, then we may try switching her to etodolac and giving her a trial of that instead.    Orders:  -     meloxicam (MOBIC) 15 MG tablet; Take 1 tablet by mouth Daily As Needed for Moderate Pain .  Dispense: 90 tablet; Refill: 0    2. Primary osteoarthritis of hands, bilateral  Assessment & Plan:  As per knee arthritis plan.        Follow Up   No follow-ups on file.  Patient was given instructions and counseling regarding her condition or for health maintenance advice. Please see specific information pulled into the AVS if appropriate.       "

## 2022-04-12 ENCOUNTER — OFFICE VISIT (OUTPATIENT)
Dept: FAMILY MEDICINE CLINIC | Age: 72
End: 2022-04-12

## 2022-04-12 VITALS
OXYGEN SATURATION: 99 % | DIASTOLIC BLOOD PRESSURE: 83 MMHG | BODY MASS INDEX: 25.69 KG/M2 | SYSTOLIC BLOOD PRESSURE: 137 MMHG | WEIGHT: 154.2 LBS | HEART RATE: 73 BPM | HEIGHT: 65 IN | TEMPERATURE: 98.4 F

## 2022-04-12 DIAGNOSIS — R05.9 COUGH: ICD-10-CM

## 2022-04-12 DIAGNOSIS — J01.10 ACUTE NON-RECURRENT FRONTAL SINUSITIS: Primary | ICD-10-CM

## 2022-04-12 PROCEDURE — 99213 OFFICE O/P EST LOW 20 MIN: CPT | Performed by: NURSE PRACTITIONER

## 2022-04-12 RX ORDER — AZITHROMYCIN 250 MG/1
TABLET, FILM COATED ORAL
Qty: 6 TABLET | Refills: 0 | Status: SHIPPED | OUTPATIENT
Start: 2022-04-12 | End: 2022-04-18 | Stop reason: SDUPTHER

## 2022-04-12 RX ORDER — DEXTROMETHORPHAN HYDROBROMIDE AND PROMETHAZINE HYDROCHLORIDE 15; 6.25 MG/5ML; MG/5ML
5 SYRUP ORAL 4 TIMES DAILY PRN
Qty: 118 ML | Refills: 0 | Status: SHIPPED | OUTPATIENT
Start: 2022-04-12 | End: 2022-04-26 | Stop reason: SDUPTHER

## 2022-04-12 NOTE — PROGRESS NOTES
"Chief Complaint  URI (Head and chest congestion. Green phlegm. Headaches. No fever/chills. /Otc Mucinex. )    Subjective    Patient is a 71-year-old female in today with complaints of sinus pain and pressure, headache, chest conduct congestion with a productive cough that began about 1 week ago.  Patient has been taking Mucinex which has helped with symptoms minimally.  Patient denies fever or shortness of breath at this time.          Luisa Sepulveda presents to [facility]  URI   This is a new problem. The current episode started in the past 7 days. There has been no fever. Associated symptoms include congestion, coughing, headaches and sinus pain. She has tried decongestant for the symptoms. The treatment provided mild relief.       Objective   Vital Signs:   /83   Pulse 73   Temp 98.4 °F (36.9 °C)   Ht 165.1 cm (65\")   Wt 69.9 kg (154 lb 3.2 oz)   SpO2 99%   BMI 25.66 kg/m²            Physical Exam  HENT:      Head: Normocephalic.      Right Ear: A middle ear effusion is present.      Left Ear: A middle ear effusion is present.      Nose: Congestion present.      Right Sinus: Frontal sinus tenderness present.      Left Sinus: Frontal sinus tenderness present.   Cardiovascular:      Rate and Rhythm: Normal rate and regular rhythm.   Pulmonary:      Effort: Pulmonary effort is normal. No respiratory distress.      Breath sounds: Normal breath sounds. No stridor. No wheezing, rhonchi or rales.   Skin:     General: Skin is warm and dry.   Neurological:      Mental Status: She is alert and oriented to person, place, and time.   Psychiatric:         Mood and Affect: Mood normal.        Result Review :                 Assessment and Plan    Diagnoses and all orders for this visit:    1. Acute non-recurrent frontal sinusitis (Primary)  -     azithromycin (Zithromax Z-Jerardo) 250 MG tablet; Take 2 tablets by mouth on day 1, then 1 tablet daily on days 2-5  Dispense: 6 tablet; " Refill: 0    2. Cough  -     promethazine-dextromethorphan (PROMETHAZINE-DM) 6.25-15 MG/5ML syrup; Take 5 mL by mouth 4 (Four) Times a Day As Needed for Cough.  Dispense: 118 mL; Refill: 0        Follow Up   Return if symptoms worsen or fail to improve.  Patient was given instructions and counseling regarding her condition or for health maintenance advice. Please see specific information pulled into the AVS if appropriate.

## 2022-04-18 DIAGNOSIS — J01.10 ACUTE NON-RECURRENT FRONTAL SINUSITIS: ICD-10-CM

## 2022-04-18 NOTE — TELEPHONE ENCOUNTER
Caller: Luisa Sepulveda    Relationship: Self    Best call back number: [phone]    Requested Prescriptions:   Requested Prescriptions     Pending Prescriptions Disp Refills   • azithromycin (Zithromax Z-Jerardo) 250 MG tablet 6 tablet 0     Sig: Take 2 tablets by mouth on day 1, then 1 tablet daily on days 2-5        Pharmacy where request should be sent: [facility] [address] [place] [address] MALIK MEMO [facility] [phone] [facility] [phone] FX     Additional details provided by patient: PATIENT HAS FINISHED THE MEDICATION BUT STILL HAS A COUGH. SHE VERBALIZED THAT THE MEDICATION HAS HELPED BUT WOULD LIKE A REFILL IF POSSIBLE TO FULLY GET RID OF THE MUCUS SHE IS COUGHING UP.    Does the patient have less than a 3 day supply:  [x] Yes  [] No    Elbert Weir Rep   04/18/22 09:19 EDT

## 2022-04-19 RX ORDER — AZITHROMYCIN 250 MG/1
TABLET, FILM COATED ORAL
Qty: 6 TABLET | Refills: 0 | Status: SHIPPED | OUTPATIENT
Start: 2022-04-19 | End: 2022-04-26

## 2022-04-26 ENCOUNTER — OFFICE VISIT (OUTPATIENT)
Dept: FAMILY MEDICINE CLINIC | Age: 72
End: 2022-04-26

## 2022-04-26 VITALS
BODY MASS INDEX: 25.56 KG/M2 | OXYGEN SATURATION: 98 % | SYSTOLIC BLOOD PRESSURE: 117 MMHG | HEIGHT: 65 IN | TEMPERATURE: 98.6 F | DIASTOLIC BLOOD PRESSURE: 42 MMHG | WEIGHT: 153.4 LBS | HEART RATE: 89 BPM

## 2022-04-26 DIAGNOSIS — R06.2 WHEEZING: ICD-10-CM

## 2022-04-26 DIAGNOSIS — R05.9 COUGH: Primary | ICD-10-CM

## 2022-04-26 PROCEDURE — 96372 THER/PROPH/DIAG INJ SC/IM: CPT | Performed by: NURSE PRACTITIONER

## 2022-04-26 PROCEDURE — 99213 OFFICE O/P EST LOW 20 MIN: CPT | Performed by: NURSE PRACTITIONER

## 2022-04-26 RX ORDER — DEXTROMETHORPHAN HYDROBROMIDE AND PROMETHAZINE HYDROCHLORIDE 15; 6.25 MG/5ML; MG/5ML
5 SYRUP ORAL 4 TIMES DAILY PRN
Qty: 118 ML | Refills: 0 | Status: SHIPPED | OUTPATIENT
Start: 2022-04-26 | End: 2022-06-23

## 2022-04-26 RX ORDER — ALBUTEROL SULFATE 90 UG/1
2 AEROSOL, METERED RESPIRATORY (INHALATION) EVERY 4 HOURS PRN
Qty: 8 G | Refills: 0 | Status: SHIPPED | OUTPATIENT
Start: 2022-04-26 | End: 2022-07-14

## 2022-04-26 RX ORDER — TRIAMCINOLONE ACETONIDE 40 MG/ML
60 INJECTION, SUSPENSION INTRA-ARTICULAR; INTRAMUSCULAR ONCE
Status: COMPLETED | OUTPATIENT
Start: 2022-04-26 | End: 2022-04-26

## 2022-04-26 RX ADMIN — TRIAMCINOLONE ACETONIDE 60 MG: 40 INJECTION, SUSPENSION INTRA-ARTICULAR; INTRAMUSCULAR at 11:19

## 2022-04-26 NOTE — PROGRESS NOTES
"Chief Complaint  Cough (Productive cough with green mucus x3 weeks), Generalized Body Aches, and Other (Pt declined covid/flu swab. Pt states she is moving and around dust, mold and drywall. )    Sushma Sepulveda presents to [facility]  Cough  This is a new problem. The current episode started 1 to 4 weeks ago. The problem has been gradually worsening. The cough is productive of sputum. Associated symptoms include chills, nasal congestion and rhinorrhea. Pertinent negatives include no fever.       Objective   Vital Signs:   /42 (BP Location: Left arm, Patient Position: Sitting, Cuff Size: Adult)   Pulse 89   Temp 98.6 °F (37 °C) (Oral)   Ht 165.1 cm (65\")   Wt 69.6 kg (153 lb 6.4 oz)   SpO2 98%   BMI 25.53 kg/m²     Physical Exam  HENT:      Head: Normocephalic.   Cardiovascular:      Rate and Rhythm: Normal rate and regular rhythm.   Pulmonary:      Effort: Pulmonary effort is normal.      Breath sounds: Normal breath sounds.   Skin:     General: Skin is warm and dry.   Neurological:      Mental Status: She is alert and oriented to person, place, and time.   Psychiatric:         Mood and Affect: Mood normal.        Result Review :                 Assessment and Plan    Diagnoses and all orders for this visit:    1. Cough (Primary)  Comments:  Patient remodeling, will be complete on Saturday, OTC allergy medication recommended, if cough continues, follow up in two weeks  Orders:  -     promethazine-dextromethorphan (PROMETHAZINE-DM) 6.25-15 MG/5ML syrup; Take 5 mL by mouth 4 (Four) Times a Day As Needed for Cough.  Dispense: 118 mL; Refill: 0    2. Wheezing  -     triamcinolone acetonide (KENALOG-40) injection 60 mg  -     albuterol sulfate  (90 Base) MCG/ACT inhaler; Inhale 2 puffs Every 4 (Four) Hours As Needed for Wheezing.  Dispense: 8 g; Refill: 0               Follow Up   Return in about 2 weeks (around 5/10/2022), or if symptoms worsen or " fail to improve.  Patient was given instructions and counseling regarding her condition or for health maintenance advice. Please see specific information pulled into the AVS if appropriate.

## 2022-06-15 DIAGNOSIS — G89.29 CHRONIC BILATERAL LOW BACK PAIN WITH SCIATICA, SCIATICA LATERALITY UNSPECIFIED: ICD-10-CM

## 2022-06-15 DIAGNOSIS — M17.0 PRIMARY OSTEOARTHRITIS OF BOTH KNEES: ICD-10-CM

## 2022-06-15 DIAGNOSIS — M54.40 CHRONIC BILATERAL LOW BACK PAIN WITH SCIATICA, SCIATICA LATERALITY UNSPECIFIED: ICD-10-CM

## 2022-06-15 RX ORDER — GABAPENTIN 100 MG/1
CAPSULE ORAL
Qty: 270 CAPSULE | Refills: 2 | Status: SHIPPED | OUTPATIENT
Start: 2022-06-15 | End: 2022-12-27

## 2022-06-15 RX ORDER — MELOXICAM 15 MG/1
15 TABLET ORAL DAILY PRN
Qty: 90 TABLET | Refills: 1 | Status: SHIPPED | OUTPATIENT
Start: 2022-06-15 | End: 2023-01-24

## 2022-06-15 RX ORDER — OMEPRAZOLE 40 MG/1
CAPSULE, DELAYED RELEASE ORAL
Qty: 90 CAPSULE | Refills: 1 | Status: SHIPPED | OUTPATIENT
Start: 2022-06-15 | End: 2023-01-23

## 2022-06-23 ENCOUNTER — OFFICE VISIT (OUTPATIENT)
Dept: FAMILY MEDICINE CLINIC | Age: 72
End: 2022-06-23

## 2022-06-23 VITALS
SYSTOLIC BLOOD PRESSURE: 103 MMHG | BODY MASS INDEX: 25.52 KG/M2 | HEIGHT: 65 IN | HEART RATE: 64 BPM | OXYGEN SATURATION: 98 % | DIASTOLIC BLOOD PRESSURE: 52 MMHG | TEMPERATURE: 98.2 F | WEIGHT: 153.2 LBS

## 2022-06-23 DIAGNOSIS — E78.2 MIXED HYPERLIPIDEMIA: ICD-10-CM

## 2022-06-23 DIAGNOSIS — B35.1 ONYCHOMYCOSIS: ICD-10-CM

## 2022-06-23 DIAGNOSIS — I10 ESSENTIAL HYPERTENSION: ICD-10-CM

## 2022-06-23 DIAGNOSIS — M19.041 PRIMARY OSTEOARTHRITIS OF HANDS, BILATERAL: ICD-10-CM

## 2022-06-23 DIAGNOSIS — M19.042 PRIMARY OSTEOARTHRITIS OF HANDS, BILATERAL: ICD-10-CM

## 2022-06-23 DIAGNOSIS — Z79.899 HIGH RISK MEDICATION USE: ICD-10-CM

## 2022-06-23 DIAGNOSIS — G89.29 CHRONIC BILATERAL LOW BACK PAIN WITH RIGHT-SIDED SCIATICA: Primary | ICD-10-CM

## 2022-06-23 DIAGNOSIS — F33.1 MAJOR DEPRESSIVE DISORDER, RECURRENT EPISODE, MODERATE DEGREE: ICD-10-CM

## 2022-06-23 DIAGNOSIS — E03.9 ACQUIRED HYPOTHYROIDISM: ICD-10-CM

## 2022-06-23 DIAGNOSIS — G47.33 OBSTRUCTIVE SLEEP APNEA: ICD-10-CM

## 2022-06-23 DIAGNOSIS — M54.41 CHRONIC BILATERAL LOW BACK PAIN WITH RIGHT-SIDED SCIATICA: Primary | ICD-10-CM

## 2022-06-23 PROCEDURE — 99214 OFFICE O/P EST MOD 30 MIN: CPT | Performed by: FAMILY MEDICINE

## 2022-06-23 PROCEDURE — 80305 DRUG TEST PRSMV DIR OPT OBS: CPT | Performed by: FAMILY MEDICINE

## 2022-06-23 NOTE — ASSESSMENT & PLAN NOTE
Referral to Dr. Sen Pena for fungal toenails of the bilateral feet.  (Her sister recently cut the nails back and they look pretty good today, but this has been an ongoing struggle for her.)

## 2022-06-23 NOTE — PROGRESS NOTES
"Chief Complaint  Arthritis (3 month f/u)    Subjective          Luisa Sepulveda presents to [facility] today for routine f/u of chronic issues.    She is due for Prevnar 20.    She is back on meloxicam for her generalized arthritis pain.   She has been on diclofenac and meloxicam previously without much relief.  She is following with [facility] and has had Synvisc injections in the knees.  Unfortunately, she is not getting much relief with these.    She is noticing excessive daytime sleepiness.  Sometimes feels like she is going to fall asleep while driving.  Also notices it when she sits down at any point during the day.  She does not feel well rested when she wakes up in the morning.  +Snoring.  +Apneic episodes.      She is on paroxetine and buspirone for depression and insomnia.  Mood has been \"pretty good.\"  She has used clonazepam in the past but has not taken that in over a year now.      She is on gabapentin and diclofenac for history of chronic low back pain with lumbar radiculopathy.       She is on levothyroxine for hypothyroidism.  She denies heat/cold intolerance , changes in hair or skin.        She is on carvedilol for HTN.  Her BP has been well controlled.  She denies chest pain, shortness of breath, or palpitations.      She is on atorvastation for hyperlipidemia.   No myalgias.        She is on  omeprazole for GERD.  She denies reflux or indigestion.  She has prn Zofran in case nausea flares up.  Following with Dr. Boo LAWOSN.      Current Outpatient Medications:   •  albuterol sulfate  (90 Base) MCG/ACT inhaler, Inhale 2 puffs Every 4 (Four) Hours As Needed for Wheezing., Disp: 8 g, Rfl: 0  •  atorvastatin (LIPITOR) 40 MG tablet, TAKE 1 TABLET EVERY DAY, Disp: 90 tablet, Rfl: 1  •  busPIRone (BUSPAR) 5 MG tablet, Take 1 tablet by mouth 2 (Two) Times a Day As Needed (anxiety)., Disp: 180 tablet, Rfl: 1  •  carvedilol (COREG) 3.125 MG " "tablet, TAKE 1 TABLET BY MOUTH EVERY NIGHT., Disp: 90 tablet, Rfl: 1  •  gabapentin (NEURONTIN) 100 MG capsule, TAKE 2 TO 3 CAPSULES 3 TIMES DAILY AS NEEDED, Disp: 270 capsule, Rfl: 2  •  hydrOXYzine (ATARAX) 25 MG tablet, TAKE 1/2 TO 1 TABLET EVERY 6 HOURS DURING THE DAY AND 1 TO 2 TABLETS EVERY NIGHT AS NEEDED FOR ANXIETY, Disp: 180 tablet, Rfl: 1  •  levothyroxine (SYNTHROID, LEVOTHROID) 50 MCG tablet, TAKE 1 TABLET EVERY DAY, Disp: 90 tablet, Rfl: 1  •  meloxicam (MOBIC) 15 MG tablet, TAKE 1 TABLET BY MOUTH DAILY AS NEEDED FOR MODERATE PAIN, Disp: 90 tablet, Rfl: 1  •  omeprazole (priLOSEC) 40 MG capsule, TAKE 1 CAPSULE EVERY DAY, Disp: 90 capsule, Rfl: 1  •  ondansetron (ZOFRAN) 4 MG tablet, TAKE 1 TABLET TWICE DAILY AS NEEDED FOR NAUSEA AND VOMITING, Disp: 60 tablet, Rfl: 0  •  PARoxetine (PAXIL) 30 MG tablet, Take 1 tablet by mouth Daily., Disp: 90 tablet, Rfl: 1    Allergies:  Codeine, Hydroxyzine, and Oxycodone-acetaminophen      Objective   Vital Signs:   Vitals:    06/23/22 1540   BP: 103/52   BP Location: Left arm   Patient Position: Sitting   Pulse: 64   Temp: 98.2 °F (36.8 °C)   TempSrc: Oral   SpO2: 98%  Comment: on room air   Weight: 69.5 kg (153 lb 3.2 oz)   Height: 165.1 cm (65\")       Physical Exam  Vitals reviewed.   Constitutional:       General: She is not in acute distress.     Appearance: Normal appearance. She is well-developed.   HENT:      Head: Normocephalic and atraumatic.      Right Ear: External ear normal.      Left Ear: External ear normal.      Mouth/Throat:      Mouth: Mucous membranes are moist.   Eyes:      Extraocular Movements: Extraocular movements intact.      Conjunctiva/sclera: Conjunctivae normal.      Pupils: Pupils are equal, round, and reactive to light.   Cardiovascular:      Rate and Rhythm: Normal rate and regular rhythm.      Heart sounds: No murmur heard.  Pulmonary:      Effort: Pulmonary effort is normal.      Breath sounds: Normal breath sounds. No wheezing, " rhonchi or rales.   Abdominal:      General: Bowel sounds are normal. There is no distension.      Palpations: Abdomen is soft.      Tenderness: There is no abdominal tenderness.   Musculoskeletal:         General: Normal range of motion.   Skin:     General: Skin is warm and dry.   Neurological:      Mental Status: She is alert and oriented to person, place, and time.      Deep Tendon Reflexes: Reflexes normal.   Psychiatric:         Mood and Affect: Mood and affect normal.         Behavior: Behavior normal.         Thought Content: Thought content normal.         Judgment: Judgment normal.        Lab Results   Component Value Date    GLUCOSE 86 01/12/2022    BUN 17 01/12/2022    CREATININE 0.96 01/12/2022    EGFRIFNONA 60 01/12/2022    EGFRIFAFRI 69 01/12/2022    BCR 18 01/12/2022    K 4.6 01/12/2022    CO2 21 01/12/2022    CALCIUM 9.4 01/12/2022    PROTENTOTREF 6.6 01/12/2022    ALBUMIN 4.4 01/12/2022    LABIL2 2.0 01/12/2022    AST 25 01/12/2022    ALT 29 01/12/2022     Lab Results   Component Value Date    CHLPL 165 01/12/2022    TRIG 166 (H) 01/12/2022    HDL 54 01/12/2022    LDL 83 01/12/2022     Lab Results   Component Value Date    TSH 1.960 01/12/2022            Assessment and Plan    Diagnoses and all orders for this visit:    1. Chronic bilateral low back pain with right-sided sciatica (Primary)  Assessment & Plan:  Stable on current regimen.  Symptoms are well controlled.  No adverse effects. She does require ongoing use of this controlled substance to function.  Tox screen was due today.  Prior tox screen appropriate.  JOVANA was run today.  Refills were not needed today.  RTC 3 months.        2. High risk medication use  -     POC Urine Drug Screen [facility]    3. Obstructive sleep apnea  Assessment & Plan:  Excessive daytime sleepiness along with snoring and witnessed apneic episodes by her fiancé Martin.  She does have a diagnosis of obstructive sleep apnea for maybe 30 years ago but she could  not tolerate CPAP back at that time so she has been treated ever since.  We are going to get her back in with Sleep Medicine for repeat evaluation and hopefully they can find her more tolerable form of positive airway pressure as I think will be very difficult if not impossible to act to quickly treat her fatigue without getting her back onto CPAP or something similar.    Orders:  -     Ambulatory Referral to Sleep Medicine    4. Essential hypertension  Assessment & Plan:  Stable on Coreg 3.125 mg twice daily.  No refills needed.  Labs reviewed and up-to-date.      5. Mixed hyperlipidemia  Assessment & Plan:  Stable on atorvastatin 40 mg daily.  No refills needed.  Labs reviewed and up-to-date.      6. Acquired hypothyroidism  Assessment & Plan:  Stable on levothyroxine 50 mcg daily.  Labs reviewed and up-to-date.  No refills needed.      7. Major depressive disorder, recurrent episode, moderate degree (HCC)  Assessment & Plan:  Stable on paroxetine 30 mg daily and buspirone 5 mg twice daily as needed.  She also has hydroxyzine 25 mg 1-2 tabs nightly as needed for anxiety.  No refills needed today.  Continue to monitor.      8. Onychomycosis  Assessment & Plan:  Referral to Dr. Sen Pena for fungal toenails of the bilateral feet.  (Her sister recently cut the nails back and they look pretty good today, but this has been an ongoing struggle for her.)    Orders:  -     Ambulatory Referral to Podiatry    9. Primary osteoarthritis of hands, bilateral  Assessment & Plan:  Doing better with the switch back to meloxicam.  We will keep her on that for now.        Follow Up   Return in about 3 months (around 9/23/2022).  Patient was given instructions and counseling regarding her condition or for health maintenance advice. Please see specific information pulled into the AVS if appropriate.

## 2022-06-23 NOTE — ASSESSMENT & PLAN NOTE
Stable on current regimen.  Symptoms are well controlled.  No adverse effects. She does require ongoing use of this controlled substance to function.  Tox screen was due today.  Prior tox screen appropriate.  JOVANA was run today.  Refills were not needed today.  RTC 3 months.

## 2022-06-23 NOTE — ASSESSMENT & PLAN NOTE
Excessive daytime sleepiness along with snoring and witnessed apneic episodes by her fiancé Martin.  She does have a diagnosis of obstructive sleep apnea for maybe 30 years ago but she could not tolerate CPAP back at that time so she has been treated ever since.  We are going to get her back in with Sleep Medicine for repeat evaluation and hopefully they can find her more tolerable form of positive airway pressure as I think will be very difficult if not impossible to act to quickly treat her fatigue without getting her back onto CPAP or something similar.

## 2022-06-23 NOTE — ASSESSMENT & PLAN NOTE
Stable on paroxetine 30 mg daily and buspirone 5 mg twice daily as needed.  She also has hydroxyzine 25 mg 1-2 tabs nightly as needed for anxiety.  No refills needed today.  Continue to monitor.

## 2022-07-01 ENCOUNTER — TELEPHONE (OUTPATIENT)
Dept: FAMILY MEDICINE CLINIC | Age: 72
End: 2022-07-01

## 2022-07-01 NOTE — TELEPHONE ENCOUNTER
OK, the sleep specialist can order a home sleep study for her as well.  [facility] can help her navigate this with the insurance.  Thanks, CHRISTEN

## 2022-07-01 NOTE — TELEPHONE ENCOUNTER
Pt insurance will not cover an in office sleep study due to pt not having any moderate or severe comorbidities, states pt must have an in home sleep study    [phone]

## 2022-07-14 ENCOUNTER — OFFICE VISIT (OUTPATIENT)
Dept: FAMILY MEDICINE CLINIC | Age: 72
End: 2022-07-14

## 2022-07-14 VITALS
HEIGHT: 65 IN | WEIGHT: 152.6 LBS | SYSTOLIC BLOOD PRESSURE: 118 MMHG | BODY MASS INDEX: 25.43 KG/M2 | TEMPERATURE: 98.2 F | OXYGEN SATURATION: 97 % | DIASTOLIC BLOOD PRESSURE: 64 MMHG | HEART RATE: 68 BPM

## 2022-07-14 DIAGNOSIS — R05.9 COUGH: Primary | ICD-10-CM

## 2022-07-14 PROCEDURE — 99213 OFFICE O/P EST LOW 20 MIN: CPT | Performed by: NURSE PRACTITIONER

## 2022-07-14 RX ORDER — DEXTROMETHORPHAN HYDROBROMIDE AND PROMETHAZINE HYDROCHLORIDE 15; 6.25 MG/5ML; MG/5ML
5 SYRUP ORAL 4 TIMES DAILY PRN
Qty: 240 ML | Refills: 0 | Status: SHIPPED | OUTPATIENT
Start: 2022-07-14 | End: 2022-10-27

## 2022-07-14 RX ORDER — BENZONATATE 200 MG/1
200 CAPSULE ORAL 3 TIMES DAILY PRN
Qty: 60 CAPSULE | Refills: 1 | Status: SHIPPED | OUTPATIENT
Start: 2022-07-14 | End: 2022-09-29

## 2022-07-14 NOTE — PROGRESS NOTES
"Chief Complaint  Cough (Pt feels like something is stuck in her throat ongoing for over 2 months)    Subjective          Luisa Sepulveda presents to [facility]  Cough  This is a recurrent problem. The current episode started more than 1 month ago. The problem has been waxing and waning. The cough is non-productive. Associated symptoms include postnasal drip. Pertinent negatives include no chills, ear congestion, ear pain, fever, headaches, nasal congestion, sore throat or shortness of breath. The symptoms are aggravated by lying down. She has tried OTC cough suppressant for the symptoms. The treatment provided mild relief.       Objective   Vital Signs:  /64 (BP Location: Left arm, Patient Position: Sitting, Cuff Size: Adult)   Pulse 68   Temp 98.2 °F (36.8 °C) (Oral)   Ht 165.1 cm (65\")   Wt 69.2 kg (152 lb 9.6 oz)   SpO2 97% Comment: Room air  BMI 25.39 kg/m²   Estimated body mass index is 25.39 kg/m² as calculated from the following:    Height as of this encounter: 165.1 cm (65\").    Weight as of this encounter: 69.2 kg (152 lb 9.6 oz).          Physical Exam  HENT:      Head: Normocephalic.   Cardiovascular:      Rate and Rhythm: Normal rate.   Pulmonary:      Effort: Pulmonary effort is normal. No respiratory distress.      Breath sounds: Normal breath sounds. No stridor. No wheezing, rhonchi or rales.   Skin:     General: Skin is warm and dry.   Neurological:      Mental Status: She is alert and oriented to person, place, and time.   Psychiatric:         Mood and Affect: Mood normal.        Result Review :                Assessment and Plan   Diagnoses and all orders for this visit:    1. Cough (Primary)  -     benzonatate (TESSALON) 200 MG capsule; Take 1 capsule by mouth 3 (Three) Times a Day As Needed for Cough.  Dispense: 60 capsule; Refill: 1  -     promethazine-dextromethorphan (PROMETHAZINE-DM) 6.25-15 MG/5ML syrup; Take 5 mL by mouth 4 (Four) Times a Day As " Needed for Cough.  Dispense: 240 mL; Refill: 0             Follow Up   Return if symptoms worsen or fail to improve.  Patient was given instructions and counseling regarding her condition or for health maintenance advice. Please see specific information pulled into the AVS if appropriate.

## 2022-08-04 ENCOUNTER — OFFICE VISIT (OUTPATIENT)
Dept: FAMILY MEDICINE CLINIC | Age: 72
End: 2022-08-04

## 2022-08-04 ENCOUNTER — HOSPITAL ENCOUNTER (OUTPATIENT)
Dept: GENERAL RADIOLOGY | Facility: HOSPITAL | Age: 72
Discharge: HOME | End: 2022-08-04
Admitting: NURSE PRACTITIONER

## 2022-08-04 VITALS
HEART RATE: 68 BPM | SYSTOLIC BLOOD PRESSURE: 120 MMHG | BODY MASS INDEX: 25.99 KG/M2 | RESPIRATION RATE: 16 BRPM | WEIGHT: 156 LBS | HEIGHT: 65 IN | DIASTOLIC BLOOD PRESSURE: 66 MMHG

## 2022-08-04 DIAGNOSIS — R07.81 RIB PAIN ON RIGHT SIDE: Primary | ICD-10-CM

## 2022-08-04 DIAGNOSIS — R07.81 RIB PAIN ON RIGHT SIDE: ICD-10-CM

## 2022-08-04 PROCEDURE — 99213 OFFICE O/P EST LOW 20 MIN: CPT | Performed by: NURSE PRACTITIONER

## 2022-08-04 PROCEDURE — 71101 X-RAY EXAM UNILAT RIBS/CHEST: CPT

## 2022-08-04 RX ORDER — HYDROCODONE BITARTRATE AND ACETAMINOPHEN 5; 325 MG/1; MG/1
.5-1 TABLET ORAL EVERY 6 HOURS PRN
Qty: 12 TABLET | Refills: 0 | Status: SHIPPED | OUTPATIENT
Start: 2022-08-04 | End: 2022-08-10

## 2022-08-04 RX ORDER — METHOCARBAMOL 500 MG/1
500 TABLET, FILM COATED ORAL 4 TIMES DAILY
Qty: 40 TABLET | Refills: 1 | Status: SHIPPED | OUTPATIENT
Start: 2022-08-04 | End: 2022-09-29

## 2022-08-04 NOTE — PROGRESS NOTES
"Chief Complaint  Fall (Fell out of bed last week. Right sided right rib and back pain. Pain worse with inhaling a deep breath. )    Sushma Padilla presents to [facility]  Fall  The accident occurred 5 to 7 days ago. The fall occurred from a bed. She fell from a height of 3 to 5 ft. She landed on hard floor. There was no blood loss. The point of impact was the right shoulder. The pain is present in the back. The pain is at a severity of 10/10. The pain is severe. The symptoms are aggravated by movement.       Objective   Vital Signs:  /66 (BP Location: Left arm, Patient Position: Sitting, Cuff Size: Adult)   Pulse 68   Resp 16   Ht 165.1 cm (65\")   Wt 70.8 kg (156 lb)   BMI 25.96 kg/m²   Estimated body mass index is 25.96 kg/m² as calculated from the following:    Height as of this encounter: 165.1 cm (65\").    Weight as of this encounter: 70.8 kg (156 lb).          Physical Exam  HENT:      Head: Normocephalic.   Cardiovascular:      Rate and Rhythm: Normal rate and regular rhythm.   Pulmonary:      Effort: Pulmonary effort is normal. No respiratory distress.      Breath sounds: Normal breath sounds. No stridor. No wheezing, rhonchi or rales.   Chest:      Chest wall: Tenderness present.       Skin:     General: Skin is warm and dry.   Neurological:      Mental Status: She is alert and oriented to person, place, and time.   Psychiatric:         Mood and Affect: Mood normal. Affect is tearful.        Result Review :                Assessment and Plan   Diagnoses and all orders for this visit:    1. Rib pain on right side (Primary)  -     XR Ribs Right With PA Chest; Future  -     methocarbamol (Robaxin) 500 MG tablet; Take 1 tablet by mouth 4 (Four) Times a Day.  Dispense: 40 tablet; Refill: 1  -     HYDROcodone-acetaminophen (NORCO) 5-325 MG per tablet; Take 0.5-1 tablets by mouth Every 6 (Six) Hours As Needed for Moderate Pain .  Dispense: 12 tablet; " Refill: 0             Follow Up   Return if symptoms worsen or fail to improve.  Patient was given instructions and counseling regarding her condition or for health maintenance advice. Please see specific information pulled into the AVS if appropriate.

## 2022-08-04 NOTE — PROGRESS NOTES
Opal called just now asking for some pain medication for Ms. Padilla.  She had an injury and has a couple of broken ribs.  I do think it is reasonable to go ahead and cover her with a little bit of hydrocodone.  This is been sent to [facility].  PDMP has been reviewed.  We will obtain her written consent.  Thanks.

## 2022-08-08 DIAGNOSIS — R07.81 RIB PAIN ON RIGHT SIDE: ICD-10-CM

## 2022-08-10 ENCOUNTER — TELEPHONE (OUTPATIENT)
Dept: FAMILY MEDICINE CLINIC | Age: 72
End: 2022-08-10

## 2022-08-10 DIAGNOSIS — S22.39XD CLOSED FRACTURE OF ONE RIB WITH ROUTINE HEALING, UNSPECIFIED LATERALITY, SUBSEQUENT ENCOUNTER: Primary | ICD-10-CM

## 2022-08-10 RX ORDER — TRAMADOL HYDROCHLORIDE 50 MG/1
50 TABLET ORAL EVERY 8 HOURS PRN
Qty: 30 TABLET | Refills: 0 | Status: SHIPPED | OUTPATIENT
Start: 2022-08-10 | End: 2022-09-29

## 2022-08-10 RX ORDER — HYDROCODONE BITARTRATE AND ACETAMINOPHEN 5; 325 MG/1; MG/1
.5-1 TABLET ORAL EVERY 6 HOURS PRN
Qty: 12 TABLET | Refills: 0 | OUTPATIENT
Start: 2022-08-10

## 2022-08-10 NOTE — TELEPHONE ENCOUNTER
Pt states that she is out of pain med.  She didn't realize she could take a half pill and her  was giving her a whole pill at first, then she broke the last few in half and that helped her but she is out.  She says she is still having a hard time with the pain when she gets up and down its still taking her breath

## 2022-08-10 NOTE — TELEPHONE ENCOUNTER
Caller: Luisa Padilla    Relationship: Self    Best call back number: [phone]    What is the best time to reach you: ANY TIME     Who are you requesting to speak with (clinical staff, provider,  specific staff member): CLINICAL         What was the call regarding: PATIENT HAS QUESTIONS REGARDING HER INJURY THAT SHE SAW PROVIDER FOR ON 08/04/2022    Do you require a callback: YES

## 2022-08-22 DIAGNOSIS — F41.9 ANXIETY: ICD-10-CM

## 2022-08-22 DIAGNOSIS — F33.1 MAJOR DEPRESSIVE DISORDER, RECURRENT EPISODE, MODERATE DEGREE: ICD-10-CM

## 2022-08-22 RX ORDER — LEVOTHYROXINE SODIUM 0.05 MG/1
TABLET ORAL
Qty: 90 TABLET | Refills: 1 | Status: SHIPPED | OUTPATIENT
Start: 2022-08-22 | End: 2023-01-23

## 2022-08-22 RX ORDER — BUSPIRONE HYDROCHLORIDE 5 MG/1
TABLET ORAL
Qty: 180 TABLET | Refills: 1 | Status: SHIPPED | OUTPATIENT
Start: 2022-08-22 | End: 2023-01-24

## 2022-08-22 RX ORDER — PAROXETINE 30 MG/1
TABLET, FILM COATED ORAL
Qty: 90 TABLET | Refills: 1 | Status: SHIPPED | OUTPATIENT
Start: 2022-08-22 | End: 2023-01-23

## 2022-08-22 RX ORDER — ATORVASTATIN CALCIUM 40 MG/1
TABLET, FILM COATED ORAL
Qty: 90 TABLET | Refills: 1 | Status: SHIPPED | OUTPATIENT
Start: 2022-08-22 | End: 2023-01-23

## 2022-08-22 RX ORDER — CARVEDILOL 3.12 MG/1
TABLET ORAL
Qty: 90 TABLET | Refills: 1 | Status: SHIPPED | OUTPATIENT
Start: 2022-08-22 | End: 2023-01-23

## 2022-08-30 ENCOUNTER — OFFICE VISIT (OUTPATIENT)
Dept: FAMILY MEDICINE CLINIC | Age: 72
End: 2022-08-30

## 2022-08-30 VITALS
OXYGEN SATURATION: 98 % | BODY MASS INDEX: 25.83 KG/M2 | HEIGHT: 65 IN | DIASTOLIC BLOOD PRESSURE: 49 MMHG | SYSTOLIC BLOOD PRESSURE: 116 MMHG | WEIGHT: 155 LBS | HEART RATE: 89 BPM

## 2022-08-30 DIAGNOSIS — H01.005 BLEPHARITIS OF LEFT LOWER EYELID, UNSPECIFIED TYPE: Primary | ICD-10-CM

## 2022-08-30 PROCEDURE — 99213 OFFICE O/P EST LOW 20 MIN: CPT | Performed by: NURSE PRACTITIONER

## 2022-08-30 RX ORDER — ERYTHROMYCIN 5 MG/G
OINTMENT OPHTHALMIC NIGHTLY
Qty: 3.5 G | Refills: 0 | Status: SHIPPED | OUTPATIENT
Start: 2022-08-30 | End: 2022-12-12

## 2022-08-30 NOTE — TELEPHONE ENCOUNTER
Chief Complaint   Patient presents with   • Shortness of Breath        HPI:    Patient presents emergency department with some shortness of breath over the last 3 days.  Shortness of breath is with exertion, has not noted any dark stool, he is on Coumadin for atrial fibrillation, he had a CABG about 6 years ago.  He has not had any stents then.  His atrial fibrillation is paroxysmal.  He does have a pig valve.  He admits to mild lower extremity swelling.  He denies any orthopnea or PND.  He denies any chest heaviness or pressure.  He says his symptoms are more exertional.  Review of Systems       Constitutional symptoms:  No fever, no chills.      Skin symptoms:  No rash,      Eye symptoms:  No blurred vision,      ENMT symptoms:  No sore throat,      Respiratory symptoms: Shortness of breath on exertion, no cough.      Cardiovascular symptoms:  No chest pain, no diaphoresis.      Gastrointestinal symptoms:  No abdominal pain, no nausea, no vomiting.      : No dysuria, no hematuria    Musculoskeletal symptoms:  No back pain,      Neurologic symptoms:  No headache, no dizziness, no numbness, no tingling, no weakness, no speech problem.      Heme: No easy bruising    PAST MEDICAL HISTORY:    GI bleed                                                      Waldenstrom's macroglobulinemia in remission (*               Arrhythmia                                                      Comment: afib    Atrial fibrillation (CMS/[facility])                                   Comment: on warfarin and sotalol    Hyperlipemia, mixed                                           TIA (transient ischemic attack)                 2012          Prostate cancer (CMS/HCC)                                       Comment: On lupron now; s/p radiation therapy; treated                by urology in [place]    Primary hypertension                                          PAST SURGICAL HISTORY:    AORTIC VALVE REPLACEMENT                        2012           Opal Morochomayank came to discuss around his case with me since she does not have her [place] license yet to send in more pain meds.  I think probably at this point, Luisa would be okay to stepdown to a tramadol prescription instead of the hydrocodone.  The tramadol is in the same family as a hydrocodone, but it is less tightly controlled, and we of more flexibility to leave her on the tramadol for longer as she recovers from these rib fractures.  I will send her in a prescription for tramadol 50 mg and she can take that every 8 hours.  Please let me know if she has any problems with it.  Thanks, CHRISTEN     Comment: pig    BACK SURGERY                                                  TOTAL KNEE REPLACEMENT                                          Comment: x4    TOTAL HIP REPLACEMENT                                         Social History     Tobacco Use   • Smoking status: Former Smoker     Quit date:      Years since quittin.6   • Smokeless tobacco: Never Used   Vaping Use   • Vaping Use: never used   Substance Use Topics   • Alcohol use: No     Comment: recovering alcoholic   • Drug use: No        Family History   Problem Relation Age of Onset   • Alcohol Abuse Mother    • Alcohol Abuse Father    • Alcohol Abuse Brother    • Alcohol Abuse Maternal Grandfather    • Coronary Artery Disease Neg Hx        ED Triage Vitals [22 1654]   Enc Vitals Group      /62      Heart Rate 64      Resp 18      Temp 98.4 °F (36.9 °C)      Temp src Oral      SpO2 100 %      Weight 172 lb (78 kg)      Height 5' 10\" (1.778 m)      Head Circumference       Peak Flow       Pain Score       Pain Loc       Pain Edu?       Excl. in GC?         Physical Exam   GENERAL: alert, no acute distress  SKIN: warm, dry, pink, no vesicles, no petechiae, no Purpera, no target lesions, no vesicles  HEAD: normocephalic, atraumatic,   EYES: Perrla, extraocular movements intact, normal conjunctiva, vision is grossly normal  ENT:  Oral mucosa moist, no pharyngeal erythema or exudate  NECK: supple full range of motion, no JVD,   CARDIO: regular rate rhythm, normal peripheral perfusion, no murmur, fem pulses/radial pulses+2 bilaterally, no carotid murmurs or bruits  RESPIRATORY: lungs clear to auscultation bilaterally, non-labored respirations, breath sounds equal, symmetrical expansion  CHEST WALL: no tenderness, no deformity  GASTRO: soft, nondistended, no pulsatile mass, no tenderness, no guarding, no rebound, bowel sounds normal, negative McBurney`s, negative Blackburn´s, negative rovsigs,  BACK: nontender, normal range of motion, normal  alignment  MUSCULOSKELETAL: normal range of motion, normal strength, no tenderness, edema in bilateral lower extremities, normal DP and PT pulses, no deformity, negative Homans /leg or calf tenderness  NEURO:  Alert and oriented x4  No focal neuro deficits, normal sensory, normal motor, normal coordination, normal speech  LYMPHATICS: no lymphadenopathy  PSYCH: cooperative, appropriate mood and affect      Labwork:    Results for orders placed or performed during the hospital encounter of 08/30/22   Prothrombin Time   Result Value    Prothrombin Time 27.2 (H)    INR 2.7     Comment: INR Therapeutic Range: 2.0 to 3.0 (2.5 to 3.5 recommended for recurrent thrombotic episodes and mechanical prosthetic heart valves.)   Partial Thromboplastin Time   Result Value    PTT 37 (H)   Comprehensive Metabolic Panel   Result Value    Fasting Status     Sodium 139    Potassium 3.9    Chloride 102    Carbon Dioxide 27    Anion Gap 14    Glucose 91    BUN 29 (H)    Creatinine 1.13    Glomerular Filtration Rate 64     Comment: eGFR results = or >60 mL/min/1.73m2 = Normal kidney function. Estimated GFR calculated using the CKD-EPI-R (2021) equation that does not include race in the creatinine calculation.    BUN/ Creatinine Ratio 26 (H)    Calcium 8.5    Bilirubin, Total 0.7    GOT/AST 24    GPT/ALT 20    Alkaline Phosphatase 64    Albumin 3.4 (L)    Protein, Total 6.0 (L)    Globulin 2.6    A/G Ratio 1.3   Magnesium   Result Value    Magnesium 2.3   TROPONIN I, HIGH SENSITIVITY   Result Value    Troponin I, High Sensitivity 11   CBC with Automated Differential (performable only)   Result Value    WBC 7.3    RBC 2.41 (L)    HGB 7.2 (L)    HCT 21.5 (L)    MCV 89.2    MCH 29.9    MCHC 33.5    RDW-CV 16.1 (H)    RDW-SD 51.9 (H)        NRBC 0    Neutrophil, Percent 72    Lymphocytes, Percent 16    Mono, Percent 8    Eosinophils, Percent 3    Basophils, Percent 1    Immature Granulocytes 0    Absolute Neutrophils 5.3    Absolute  Lymphocytes 1.2    Absolute Monocytes 0.6    Absolute Eosinophils  0.2    Absolute Basophils 0.1    Absolute Immmature Granulocytes 0.0   NT proBNP   Result Value    NT-proBNP 2,011 (H)   Rapid SARS-CoV-2 by PCR    Specimen: Nasal, Mid-turbinate; Swab   Result Value    Rapid SARS-COV-2 by PCR Not Detected    Isolation Guidelines      Comment: Do not use this test result as the sole decision-maker for discontinuation of isolation.   Clinical evaluation should be considered for other respiratory illness requiring transmission-based isolation.    -    No fever (<99.0 F/37.2 C) for at least 24 hours without the use of fever-reducing medications    AND  -    Respiratory symptoms have improved or resolved (e.g. cough, shortness of breath)     AND  -    COVID-19 negative test    See COVID-19 Deisolation Resource Guide    Procedural Comment      Comment: SARS-CoV-2 nucleic acid has not been detected indicating the absence of COVID-19.       Testing was performed using the Elevate HR Xpert Xpress SARS-CoV-2 RT-PCR assay that has been given Emergency Use Authorization (EUA) by the [place] Food and Drug Administration (FDA).  These results are considered definitive and do not need to be confirmed by another method.        Imaging Results          XR CHEST PA OR AP 1 VIEW (Final result)  Result time 08/30/22 19:46:02    Final result                 Impression:    FINDINGS AND IMPRESSION:    Median sternotomy wires and mediastinal clips.  Cardiomegaly.  Normal hazy  and interstitial opacities throughout the lungs are similar prior exam  which probably represents chronic changes.  Superimposed pneumonitis not  entirely excluded.  Clinical correlation advised.  Left apical pleural  calcification/scar.  No large pleural effusion.  No detectable  pneumothorax.    Electronically Signed by: ANDREW VARGAS M.D.   Signed on: 8/30/2022 7:46 PM                Narrative:    EXAM: XR CHEST PA OR AP 1 VIEW    CLINICAL INDICATION: Chest  pain    COMPARISON: 05/11/2019                                  Medications   furosemide (LASIX INJECT) injection 20 mg (has no administration in time range)   pantoprazole (PROTONIX INJECT) injection 80 mg (has no administration in time range)   phytonadione (vitamin K1) (AQUA-MEPHYTON) 5 mg in sodium chloride 0.9 % 50 mL IVPB (has no administration in time range)             Vitals:    08/30/22 1654 08/30/22 1657 08/30/22 1956 08/30/22 2003   BP: 121/62  131/60    Pulse: 64  66 65   Resp: 18  18 18   Temp: 98.4 °F (36.9 °C)      TempSrc: Oral      SpO2: 100%  99% 100%   Weight: 78 kg (172 lb) 81 kg (178 lb 9.2 oz)     Height: 5' 10\" (1.778 m)          MDM:  Patient's EKG shows normal sinus rhythm LA interval is long QRS intervals long axis is left EKG interpreted by emergency physician pulse ox is normal monitor shows normal sinus rhythm    Critical care :30min.  Patient with GI bleed strapped approximately 4-1/2 g of hemoglobin since ago.  Patient's INR is 2.7.  Vitals are stable.  Patient will be started on blood and Protonix and vitamin K per conversation with Dr. Sims.  With best     ED Diagnoses     Diagnosis Comment Associated Orders       Final diagnoses    Blood in stool -- --    Anemia due to acute blood loss -- --    Dyspnea on exertion -- --    Coagulopathy (CMS/HCC) -- --    Elevated brain natriuretic peptide (BNP) level -- --           No follow-up provider specified.       Summary of your Discharge Medications      You have not been prescribed any medications.               Ander Burks MD  08/30/22 2049

## 2022-08-30 NOTE — PROGRESS NOTES
"Chief Complaint  left eye spot (Been there for 2 weeks )    Subjective        Luisa Randy presents to [facility]  Facial Swelling  This is a new problem. The current episode started 1 to 4 weeks ago. The problem occurs intermittently. Pertinent negatives include no fever or rash. Nothing aggravates the symptoms. She has tried heat for the symptoms. The treatment provided mild relief.       Objective   Vital Signs:  /49   Pulse 89   Ht 165.1 cm (65\")   Wt 70.3 kg (155 lb)   SpO2 98%   BMI 25.79 kg/m²   Estimated body mass index is 25.79 kg/m² as calculated from the following:    Height as of this encounter: 165.1 cm (65\").    Weight as of this encounter: 70.3 kg (155 lb).          Physical Exam  HENT:      Head: Normocephalic.   Eyes:      General:         Left eye: No discharge.      Conjunctiva/sclera:      Left eye: Left conjunctiva is not injected. No exudate.    Cardiovascular:      Rate and Rhythm: Normal rate.   Pulmonary:      Effort: Pulmonary effort is normal.   Skin:     General: Skin is warm and dry.   Neurological:      Mental Status: She is alert and oriented to person, place, and time.   Psychiatric:         Mood and Affect: Mood normal.        Result Review :                Assessment and Plan   Diagnoses and all orders for this visit:    1. Blepharitis of left lower eyelid, unspecified type (Primary)  -     erythromycin (ROMYCIN) 5 MG/GM ophthalmic ointment; Administer  into the left eye Every Night.  Dispense: 3.5 g; Refill: 0             Follow Up   Return if symptoms worsen or fail to improve.  Patient was given instructions and counseling regarding her condition or for health maintenance advice. Please see specific information pulled into the AVS if appropriate.       "

## 2022-09-02 ENCOUNTER — TELEPHONE (OUTPATIENT)
Dept: FAMILY MEDICINE CLINIC | Age: 72
End: 2022-09-02

## 2022-09-02 ENCOUNTER — OFFICE VISIT (OUTPATIENT)
Dept: FAMILY MEDICINE CLINIC | Age: 72
End: 2022-09-02

## 2022-09-02 VITALS
SYSTOLIC BLOOD PRESSURE: 119 MMHG | DIASTOLIC BLOOD PRESSURE: 58 MMHG | HEART RATE: 76 BPM | TEMPERATURE: 98.2 F | WEIGHT: 156.8 LBS | HEIGHT: 65 IN | OXYGEN SATURATION: 97 % | BODY MASS INDEX: 26.12 KG/M2

## 2022-09-02 DIAGNOSIS — H00.15 CHALAZION OF LEFT LOWER EYELID: Primary | ICD-10-CM

## 2022-09-02 PROCEDURE — 99213 OFFICE O/P EST LOW 20 MIN: CPT | Performed by: PHYSICIAN ASSISTANT

## 2022-09-02 NOTE — TELEPHONE ENCOUNTER
Pt saw [facility] the other day for blepharitis and states that its no better.  Opal is gone for the day.  Do you have any recommendations?

## 2022-09-02 NOTE — PROGRESS NOTES
Subjective     CHIEF COMPLAINT    Chief Complaint   Patient presents with   • Blepharitis     (L) Ongoing for 2 weeks. Pt seen ALEK Alfredo on 8/31 and she told her if it's not better to come back and be seen.             This is a 71-year-old female presenting to the clinic with persistent lesion to her left lower eyelid.  She states this has been present for 2 weeks.  She was seen by another provider in this office on 8/31/2022.  She prescribed erythromycin ointment and Luisa is concerned because it has not improved.  She is not having any trouble with her vision and no drainage from the lesion.            Review of Systems   Constitutional: Negative for chills and fever.   Eyes: Positive for redness (lower eyelid). Negative for photophobia, pain, discharge, itching and visual disturbance.   Gastrointestinal: Negative for abdominal pain, nausea and vomiting.            Past Medical History:   Diagnosis Date   • Abnormal findings on diagnostic imaging of other parts of digestive tract    • Acute vaginitis    • Allergy     CODEINE SULFATE,OXYCODONE/ACETAMINOP,HYDROXYZINE HCL   MODERATE   • Anxiety and depression    • Anxiety disorder, unspecified    • AR (allergic rhinitis)    • Chronic pain    • Depression    • Essential (primary) hypertension    • GERD (gastroesophageal reflux disease)    • History of hemoptysis     APPROX 30 YRS AGO   • Hyperlipidemia    • Hypothyroidism    • IBS (irritable bowel syndrome)     WITHOUT DIARRHEA   • Insomnia    • Low back pain    • Obstructive sleep apnea (adult) (pediatric)    • Osteoarthritis    • Osteopenia    • Other disturbances of smell and taste    • Other long term (current) drug therapy    • Other somatoform disorders    • Pain in left shoulder    • Pain in throat    • PONV (postoperative nausea and vomiting)    • Primary insomnia    • Primary osteoarthritis, left hand    • Primary osteoarthritis, right hand    • Right hip pain    • Sciatica, right side    • Sleep apnea      DOES NOT WEAR MACHINE   • Unilateral primary osteoarthritis, right hip    • Unspecified disorder of nose and nasal sinuses             Past Surgical History:   Procedure Laterality Date   • BREAST AUGMENTATION Bilateral    • CHOLECYSTECTOMY     • COLONOSCOPY     • ENDOSCOPY     • HYSTERECTOMY     • JOINT REPLACEMENT Right 2020    BALJINDER   • LUMBAR DISC SURGERY     • LUMBAR DISCECTOMY      L5-S1   • TONSILLECTOMY     • TOTAL HIP ARTHROPLASTY Right 2020    Procedure: RIGHT TOTAL HIP ARTHROPLASTY;  Surgeon: Iker Cisneros MD;  Location: [facility] MAIN OR;  Service: Orthopedics;  Laterality: Right;            Family History   Problem Relation Age of Onset   • Cancer Other         Breast   • Breast cancer Mother    • Malig Hyperthermia Neg Hx             Social History     Socioeconomic History   • Marital status:      Spouse name: EM   • Number of children: 2   Tobacco Use   • Smoking status: Former Smoker     Packs/day: 3.00     Years: 30.00     Pack years: 90.00     Types: Cigarettes     Quit date:      Years since quittin.6   • Smokeless tobacco: Never Used   Vaping Use   • Vaping Use: Never used   Substance and Sexual Activity   • Alcohol use: Never   • Drug use: Never   • Sexual activity: Not Currently     Partners: Male     Birth control/protection: Surgical     Comment: hysterectomy            Allergies   Allergen Reactions   • Codeine Nausea And Vomiting   • Hydroxyzine Other (See Comments)     Blurred vision   • Oxycodone-Acetaminophen Nausea Only            Current Outpatient Medications on File Prior to Visit   Medication Sig Dispense Refill   • atorvastatin (LIPITOR) 40 MG tablet TAKE 1 TABLET EVERY DAY 90 tablet 1   • benzonatate (TESSALON) 200 MG capsule Take 1 capsule by mouth 3 (Three) Times a Day As Needed for Cough. 60 capsule 1   • busPIRone (BUSPAR) 5 MG tablet TAKE 1 TABLET TWICE DAILY AS NEEDED FOR  ANXIETY 180 tablet 1   • carvedilol (COREG) 3.125 MG tablet TAKE 1 TABLET  "EVERY NIGHT 90 tablet 1   • erythromycin (ROMYCIN) 5 MG/GM ophthalmic ointment Administer  into the left eye Every Night. 3.5 g 0   • gabapentin (NEURONTIN) 100 MG capsule TAKE 2 TO 3 CAPSULES 3 TIMES DAILY AS NEEDED 270 capsule 2   • hydrOXYzine (ATARAX) 25 MG tablet TAKE 1/2 TO 1 TABLET EVERY 6 HOURS DURING THE DAY AND 1 TO 2 TABLETS EVERY NIGHT AS NEEDED FOR ANXIETY 180 tablet 1   • levothyroxine (SYNTHROID, LEVOTHROID) 50 MCG tablet TAKE 1 TABLET EVERY DAY 90 tablet 1   • meloxicam (MOBIC) 15 MG tablet TAKE 1 TABLET BY MOUTH DAILY AS NEEDED FOR MODERATE PAIN 90 tablet 1   • methocarbamol (Robaxin) 500 MG tablet Take 1 tablet by mouth 4 (Four) Times a Day. 40 tablet 1   • omeprazole (priLOSEC) 40 MG capsule TAKE 1 CAPSULE EVERY DAY 90 capsule 1   • ondansetron (ZOFRAN) 4 MG tablet TAKE 1 TABLET TWICE DAILY AS NEEDED FOR NAUSEA AND VOMITING 60 tablet 0   • PARoxetine (PAXIL) 30 MG tablet TAKE 1 TABLET EVERY DAY 90 tablet 1   • promethazine-dextromethorphan (PROMETHAZINE-DM) 6.25-15 MG/5ML syrup Take 5 mL by mouth 4 (Four) Times a Day As Needed for Cough. 240 mL 0   • traMADol (ULTRAM) 50 MG tablet Take 1 tablet by mouth Every 8 (Eight) Hours As Needed for Moderate Pain . 30 tablet 0     No current facility-administered medications on file prior to visit.            /58 (BP Location: Left arm, Patient Position: Sitting, Cuff Size: Adult)   Pulse 76   Temp 98.2 °F (36.8 °C) (Oral)   Ht 165.1 cm (65\")   Wt 71.1 kg (156 lb 12.8 oz)   SpO2 97% Comment: room air  BMI 26.09 kg/m²          Objective     Physical Exam  Vitals and nursing note reviewed.   Constitutional:       General: She is not in acute distress.     Appearance: Normal appearance.   Eyes:     Pulmonary:      Effort: Pulmonary effort is normal. No respiratory distress.   Skin:     General: Skin is warm and dry.      Findings: Erythema (left lower eylid, temporal side) present.   Neurological:      Mental Status: She is alert and oriented to " person, place, and time.   Psychiatric:         Mood and Affect: Mood normal.         Behavior: Behavior normal.              Procedures                    Lab Results (last 24 hours)     ** No results found for the last 24 hours. **                No Radiology Exams Resulted Within Past 24 Hours                    Diagnoses and all orders for this visit:    1. Chalazion of left lower eyelid (Primary)  Comments:  Continue with erythromycin and warm compresses as directed.  No indication for other treatments at this time.  If no improvement follow-up with ophthalmology.                           FOR FULL DISCHARGE INSTRUCTIONS/COMMENTS/HANDOUTS please see the AVS

## 2022-09-06 RX ORDER — ONDANSETRON 4 MG/1
TABLET, FILM COATED ORAL
Qty: 60 TABLET | Refills: 0 | Status: SHIPPED | OUTPATIENT
Start: 2022-09-06 | End: 2022-10-21

## 2022-09-29 ENCOUNTER — LAB (OUTPATIENT)
Dept: LAB | Facility: HOSPITAL | Age: 72
End: 2022-09-29

## 2022-09-29 ENCOUNTER — OFFICE VISIT (OUTPATIENT)
Dept: FAMILY MEDICINE CLINIC | Age: 72
End: 2022-09-29

## 2022-09-29 VITALS
BODY MASS INDEX: 26.39 KG/M2 | SYSTOLIC BLOOD PRESSURE: 128 MMHG | WEIGHT: 158.4 LBS | HEART RATE: 81 BPM | DIASTOLIC BLOOD PRESSURE: 70 MMHG | HEIGHT: 65 IN

## 2022-09-29 DIAGNOSIS — M54.41 CHRONIC BILATERAL LOW BACK PAIN WITH RIGHT-SIDED SCIATICA: ICD-10-CM

## 2022-09-29 DIAGNOSIS — E03.9 ACQUIRED HYPOTHYROIDISM: ICD-10-CM

## 2022-09-29 DIAGNOSIS — N64.4 BREAST PAIN, LEFT: ICD-10-CM

## 2022-09-29 DIAGNOSIS — G89.29 CHRONIC BILATERAL LOW BACK PAIN WITH RIGHT-SIDED SCIATICA: ICD-10-CM

## 2022-09-29 DIAGNOSIS — F33.1 MAJOR DEPRESSIVE DISORDER, RECURRENT EPISODE, MODERATE DEGREE: ICD-10-CM

## 2022-09-29 DIAGNOSIS — E78.2 MIXED HYPERLIPIDEMIA: ICD-10-CM

## 2022-09-29 DIAGNOSIS — I10 ESSENTIAL HYPERTENSION: Primary | ICD-10-CM

## 2022-09-29 DIAGNOSIS — I10 ESSENTIAL HYPERTENSION: ICD-10-CM

## 2022-09-29 DIAGNOSIS — Z23 ENCOUNTER FOR IMMUNIZATION: ICD-10-CM

## 2022-09-29 LAB
ALBUMIN SERPL-MCNC: 4.1 G/DL (ref 3.5–5.2)
ALBUMIN/GLOB SERPL: 1.6 G/DL
ALP SERPL-CCNC: 103 U/L (ref 39–117)
ALT SERPL W P-5'-P-CCNC: 17 U/L (ref 1–33)
ANION GAP SERPL CALCULATED.3IONS-SCNC: 9.3 MMOL/L (ref 5–15)
AST SERPL-CCNC: 22 U/L (ref 1–32)
BILIRUB SERPL-MCNC: 0.4 MG/DL (ref 0–1.2)
BUN SERPL-MCNC: 16 MG/DL (ref 8–23)
BUN/CREAT SERPL: 15 (ref 7–25)
CALCIUM SPEC-SCNC: 9.1 MG/DL (ref 8.6–10.5)
CHLORIDE SERPL-SCNC: 102 MMOL/L (ref 98–107)
CHOLEST SERPL-MCNC: 147 MG/DL (ref 0–200)
CO2 SERPL-SCNC: 28.7 MMOL/L (ref 22–29)
CREAT SERPL-MCNC: 1.07 MG/DL (ref 0.57–1)
EGFRCR SERPLBLD CKD-EPI 2021: 55.6 ML/MIN/1.73
GLOBULIN UR ELPH-MCNC: 2.5 GM/DL
GLUCOSE SERPL-MCNC: 88 MG/DL (ref 65–99)
HDLC SERPL-MCNC: 63 MG/DL (ref 40–60)
LDLC SERPL CALC-MCNC: 62 MG/DL (ref 0–100)
LDLC/HDLC SERPL: 0.92 {RATIO}
POTASSIUM SERPL-SCNC: 4.1 MMOL/L (ref 3.5–5.2)
PROT SERPL-MCNC: 6.6 G/DL (ref 6–8.5)
SODIUM SERPL-SCNC: 140 MMOL/L (ref 136–145)
TRIGL SERPL-MCNC: 129 MG/DL (ref 0–150)
TSH SERPL DL<=0.05 MIU/L-ACNC: 1.5 UIU/ML (ref 0.27–4.2)
VLDLC SERPL-MCNC: 22 MG/DL (ref 5–40)

## 2022-09-29 PROCEDURE — 36415 COLL VENOUS BLD VENIPUNCTURE: CPT

## 2022-09-29 PROCEDURE — 90677 PCV20 VACCINE IM: CPT | Performed by: FAMILY MEDICINE

## 2022-09-29 PROCEDURE — 84443 ASSAY THYROID STIM HORMONE: CPT

## 2022-09-29 PROCEDURE — 99214 OFFICE O/P EST MOD 30 MIN: CPT | Performed by: FAMILY MEDICINE

## 2022-09-29 PROCEDURE — 80053 COMPREHEN METABOLIC PANEL: CPT

## 2022-09-29 PROCEDURE — G0009 ADMIN PNEUMOCOCCAL VACCINE: HCPCS | Performed by: FAMILY MEDICINE

## 2022-09-29 PROCEDURE — 80061 LIPID PANEL: CPT

## 2022-09-29 NOTE — ASSESSMENT & PLAN NOTE
Stable on meloxicam for now.  She is following with Orthopedics for the generalized arthritis not in the back.  They may be looking into doing a left knee replacement in the near future.

## 2022-09-29 NOTE — ASSESSMENT & PLAN NOTE
Left breast pain for the last month.  Sharp stabbing pain in the left breast.  She does have implants.  She had screening mammogram back in March looked fine.  No obvious abnormalities on exam today except that the left nipple is slightly inverted compared to the right.  She is not sure how long it has been like this.  I am going to get her set up for a left breast diagnostic mammo with as needed ultrasound for further evaluation.

## 2022-09-29 NOTE — ASSESSMENT & PLAN NOTE
She is doing great.  In fact, she would like to try coming down off some of her meds.  I have told her that she can go down to as needed on the buspirone 5 mg twice daily.  For now, we will continue the paroxetine, but when she is using the buspirone just as needed, we will plan to start weaning back on the paroxetine as well.  I will see her back in 3 months or sooner as needed.

## 2022-09-29 NOTE — PROGRESS NOTES
"Chief Complaint  Back Pain (3 month follow up)    Subjective          Luisa Padilla presents to [facility] today for follow-up of chronic issues.    She is due for Prevnar 20 and flu.    She is back on meloxicam for generalized arthritis pain.  She does use gabapentin for chronic low back pain with RLE sciatica as well.  Tox screen is UTD, last done 6/2022.  Has used diclofenac and methocarbamol previously.  She is following with Dr. Smith at [facility] and has had Synvisc injections in the knees.  She did fracture a rib a few weeks ago and was on tramadol for that.  Finished that course.  She went to see Ortho and was diagnosed with a cyst in the back of the left knee.      She was diagnosed with blepharitis, seeing Dr. Singh.      She is on paroxetine and buspirone for depression and insomnia.  Mood has been \"good.\"  She has used clonazepam in the past but has not taken that in over a year now.    She is on carvedilol for hypertension.  Her blood pressure has been well controlled.  She denies chest pain, shortness of breath, or palpitations.    She is on levothyroxine for hypothyroidism.  No cold/heat intolerance, changes in hair or skin.        She is on atorvastation for hyperlipidemia.  No myalgias.        She is on omeprazole for GERD.  She denies reflux or indigestion.  She has prn Zofran in case nausea flares up.  Following with Dr. Boo LAWSON.      Current Outpatient Medications:   •  atorvastatin (LIPITOR) 40 MG tablet, TAKE 1 TABLET EVERY DAY, Disp: 90 tablet, Rfl: 1  •  busPIRone (BUSPAR) 5 MG tablet, TAKE 1 TABLET TWICE DAILY AS NEEDED FOR  ANXIETY, Disp: 180 tablet, Rfl: 1  •  carvedilol (COREG) 3.125 MG tablet, TAKE 1 TABLET EVERY NIGHT, Disp: 90 tablet, Rfl: 1  •  erythromycin (ROMYCIN) 5 MG/GM ophthalmic ointment, Administer  into the left eye Every Night., Disp: 3.5 g, Rfl: 0  •  gabapentin (NEURONTIN) 100 MG capsule, TAKE 2 TO 3 CAPSULES 3 TIMES " "DAILY AS NEEDED, Disp: 270 capsule, Rfl: 2  •  hydrOXYzine (ATARAX) 25 MG tablet, TAKE 1/2 TO 1 TABLET EVERY 6 HOURS DURING THE DAY AND 1 TO 2 TABLETS EVERY NIGHT AS NEEDED FOR ANXIETY, Disp: 180 tablet, Rfl: 1  •  levothyroxine (SYNTHROID, LEVOTHROID) 50 MCG tablet, TAKE 1 TABLET EVERY DAY, Disp: 90 tablet, Rfl: 1  •  meloxicam (MOBIC) 15 MG tablet, TAKE 1 TABLET BY MOUTH DAILY AS NEEDED FOR MODERATE PAIN, Disp: 90 tablet, Rfl: 1  •  omeprazole (priLOSEC) 40 MG capsule, TAKE 1 CAPSULE EVERY DAY, Disp: 90 capsule, Rfl: 1  •  ondansetron (ZOFRAN) 4 MG tablet, TAKE 1 TABLET TWICE DAILY AS NEEDED FOR NAUSEA AND VOMITING, Disp: 60 tablet, Rfl: 0  •  PARoxetine (PAXIL) 30 MG tablet, TAKE 1 TABLET EVERY DAY, Disp: 90 tablet, Rfl: 1  •  promethazine-dextromethorphan (PROMETHAZINE-DM) 6.25-15 MG/5ML syrup, Take 5 mL by mouth 4 (Four) Times a Day As Needed for Cough., Disp: 240 mL, Rfl: 0    Allergies:  Codeine, Hydroxyzine, and Oxycodone-acetaminophen      Objective   Vital Signs:   Vitals:    09/29/22 0915   BP: 128/70   BP Location: Left arm   Patient Position: Sitting   Pulse: 81   Weight: 71.8 kg (158 lb 6.4 oz)   Height: 165.1 cm (65\")       Physical Exam  Vitals reviewed.   Constitutional:       General: She is not in acute distress.     Appearance: Normal appearance. She is well-developed.   HENT:      Head: Normocephalic and atraumatic.      Right Ear: External ear normal.      Left Ear: External ear normal.   Eyes:      Extraocular Movements: Extraocular movements intact.      Conjunctiva/sclera: Conjunctivae normal.      Pupils: Pupils are equal, round, and reactive to light.   Cardiovascular:      Rate and Rhythm: Normal rate and regular rhythm.      Heart sounds: No murmur heard.  Pulmonary:      Effort: Pulmonary effort is normal.      Breath sounds: Normal breath sounds. No wheezing, rhonchi or rales.   Chest:   Breasts:      Left: Inverted nipple present. No swelling, bleeding, mass, nipple discharge, skin " change, tenderness, axillary adenopathy or supraclavicular adenopathy.       Abdominal:      General: Bowel sounds are normal. There is no distension.      Palpations: Abdomen is soft.      Tenderness: There is no abdominal tenderness.   Musculoskeletal:         General: Normal range of motion.   Lymphadenopathy:      Upper Body:      Left upper body: No supraclavicular, axillary or pectoral adenopathy.   Neurological:      Mental Status: She is alert.   Psychiatric:         Mood and Affect: Mood and affect normal.         Behavior: Behavior normal.        Lab Results   Component Value Date    GLUCOSE 86 01/12/2022    BUN 17 01/12/2022    CREATININE 0.96 01/12/2022    EGFRIFNONA 60 01/12/2022    EGFRIFAFRI 69 01/12/2022    BCR 18 01/12/2022    K 4.6 01/12/2022    CO2 21 01/12/2022    CALCIUM 9.4 01/12/2022    PROTENTOTREF 6.6 01/12/2022    ALBUMIN 4.4 01/12/2022    LABIL2 2.0 01/12/2022    AST 25 01/12/2022    ALT 29 01/12/2022     Lab Results   Component Value Date    CHLPL 165 01/12/2022    TRIG 166 (H) 01/12/2022    HDL 54 01/12/2022    LDL 83 01/12/2022     Lab Results   Component Value Date    TSH 1.960 01/12/2022            Assessment and Plan    Diagnoses and all orders for this visit:    1. Essential hypertension (Primary)  Assessment & Plan:  Stable carvedilol 3.125 mg twice daily.  No refills needed.  Checking labs.    Orders:  -     Lipid Panel; Future  -     Comprehensive Metabolic Panel; Future    2. Mixed hyperlipidemia  Assessment & Plan:  Stable on atorvastatin 40 mg daily.  No refills needed.  Checking labs.      3. Acquired hypothyroidism  Assessment & Plan:  Stable on levothyroxine 50 mcg daily.  No refills needed.  Checking labs.    Orders:  -     TSH; Future    4. Chronic bilateral low back pain with right-sided sciatica  Assessment & Plan:  Stable on meloxicam for now.  She is following with Orthopedics for the generalized arthritis not in the back.  They may be looking into doing a left knee  replacement in the near future.      5. Encounter for immunization  -     Pneumococcal Conjugate Vaccine 20-Valent All    6. Breast pain, left  Assessment & Plan:  Left breast pain for the last month.  Sharp stabbing pain in the left breast.  She does have implants.  She had screening mammogram back in March looked fine.  No obvious abnormalities on exam today except that the left nipple is slightly inverted compared to the right.  She is not sure how long it has been like this.  I am going to get her set up for a left breast diagnostic mammo with as needed ultrasound for further evaluation.    Orders:  -     Mammo Diagnostic Digital Tomosynthesis Left With CAD; Future  -     US Breast Left Limited; Future    7. Major depressive disorder, recurrent episode, moderate degree (HCC)  Assessment & Plan:  She is doing great.  In fact, she would like to try coming down off some of her meds.  I have told her that she can go down to as needed on the buspirone 5 mg twice daily.  For now, we will continue the paroxetine, but when she is using the buspirone just as needed, we will plan to start weaning back on the paroxetine as well.  I will see her back in 3 months or sooner as needed.        Follow Up   Return in about 3 months (around 12/29/2022) for Medicare Wellness.  Patient was given instructions and counseling regarding her condition or for health maintenance advice. Please see specific information pulled into the AVS if appropriate.

## 2022-10-11 ENCOUNTER — TELEPHONE (OUTPATIENT)
Dept: FAMILY MEDICINE CLINIC | Age: 72
End: 2022-10-11

## 2022-10-11 DIAGNOSIS — Z12.31 ENCOUNTER FOR SCREENING MAMMOGRAM FOR BREAST CANCER: ICD-10-CM

## 2022-10-11 DIAGNOSIS — N64.4 BREAST PAIN, LEFT: Primary | ICD-10-CM

## 2022-10-11 NOTE — TELEPHONE ENCOUNTER
Call from [facility] wanting a Bilat Diag mammo order.  States it has been a year since her last screening and she is due.  I questioned the bilat DIG mammo order and she said that is what they needed because they will not switch out from screening to diag from L to R during mammo.  Please sign orders and send back to me to fax.  [phone]

## 2022-10-12 ENCOUNTER — HOSPITAL ENCOUNTER (OUTPATIENT)
Dept: OTHER | Facility: HOSPITAL | Age: 72
Discharge: HOME | End: 2022-10-12

## 2022-10-12 DIAGNOSIS — N64.4 BREAST PAIN, LEFT: ICD-10-CM

## 2022-10-12 DIAGNOSIS — Z12.31 ENCOUNTER FOR SCREENING MAMMOGRAM FOR BREAST CANCER: ICD-10-CM

## 2022-10-21 RX ORDER — ONDANSETRON 4 MG/1
TABLET, FILM COATED ORAL
Qty: 60 TABLET | Refills: 0 | Status: SHIPPED | OUTPATIENT
Start: 2022-10-21 | End: 2022-12-27

## 2022-10-27 ENCOUNTER — OFFICE VISIT (OUTPATIENT)
Dept: FAMILY MEDICINE CLINIC | Age: 72
End: 2022-10-27

## 2022-10-27 VITALS
BODY MASS INDEX: 26.12 KG/M2 | HEIGHT: 65 IN | SYSTOLIC BLOOD PRESSURE: 135 MMHG | HEART RATE: 68 BPM | DIASTOLIC BLOOD PRESSURE: 78 MMHG | TEMPERATURE: 98 F | WEIGHT: 156.8 LBS

## 2022-10-27 DIAGNOSIS — M17.0 PRIMARY OSTEOARTHRITIS OF BOTH KNEES: ICD-10-CM

## 2022-10-27 DIAGNOSIS — Z98.890 HISTORY OF AUGMENTATION OF BOTH BREASTS: ICD-10-CM

## 2022-10-27 DIAGNOSIS — N64.4 BREAST PAIN, LEFT: Primary | ICD-10-CM

## 2022-10-27 DIAGNOSIS — T85.43XA BREAST IMPLANT RUPTURE, INITIAL ENCOUNTER: ICD-10-CM

## 2022-10-27 PROCEDURE — 99214 OFFICE O/P EST MOD 30 MIN: CPT | Performed by: FAMILY MEDICINE

## 2022-10-27 NOTE — ASSESSMENT & PLAN NOTE
She continues to have left knee pain, worsened since the recent injections in the knee.  She has been reportedly diagnosed with osteoarthritic cyst in the popliteal space of the left knee.  She has been applying heat, so I have encouraged her to stop the heat and instead use ice for 10 to 20 minutes 1-2 times daily.  She also has some diclofenac gel that she got from her sister and that has given her some relief when she uses it at bedtime.  She may use it up to 4 times a day and I will send her in a prescription of her own to use.

## 2022-10-27 NOTE — PROGRESS NOTES
Chief Complaint  Discuss mammogram    Subjective          Luisa Padilla presents to [facility] today for follow-up on her recent diagnostic bilateral mammogram.  Luisa was having problems with pain in the left breast but was also due for her yearly screening mammogram, so she had a diagnostic bilateral mammogram with ultrasound of the left breast.  She did not have any evidence of malignancy in either breast but she did demonstrate chronic bilateral silicone implant rupture rupture on the scans.  She does report clinical changes to the left nipple with some indentation and slightly darker color.  However, diagnostic imaging of the left nipple did not demonstrate any abnormalities.     She is still having pain and tenderness in the L breast.  R breast is not bothering her.    She is still having left leg pain, worse since getting Synvisc injections from [facility].        Current Outpatient Medications:   •  atorvastatin (LIPITOR) 40 MG tablet, TAKE 1 TABLET EVERY DAY, Disp: 90 tablet, Rfl: 1  •  busPIRone (BUSPAR) 5 MG tablet, TAKE 1 TABLET TWICE DAILY AS NEEDED FOR  ANXIETY, Disp: 180 tablet, Rfl: 1  •  carvedilol (COREG) 3.125 MG tablet, TAKE 1 TABLET EVERY NIGHT, Disp: 90 tablet, Rfl: 1  •  erythromycin (ROMYCIN) 5 MG/GM ophthalmic ointment, Administer  into the left eye Every Night., Disp: 3.5 g, Rfl: 0  •  gabapentin (NEURONTIN) 100 MG capsule, TAKE 2 TO 3 CAPSULES 3 TIMES DAILY AS NEEDED, Disp: 270 capsule, Rfl: 2  •  hydrOXYzine (ATARAX) 25 MG tablet, TAKE 1/2 TO 1 TABLET EVERY 6 HOURS DURING THE DAY AND 1 TO 2 TABLETS EVERY NIGHT AS NEEDED FOR ANXIETY, Disp: 180 tablet, Rfl: 1  •  levothyroxine (SYNTHROID, LEVOTHROID) 50 MCG tablet, TAKE 1 TABLET EVERY DAY, Disp: 90 tablet, Rfl: 1  •  meloxicam (MOBIC) 15 MG tablet, TAKE 1 TABLET BY MOUTH DAILY AS NEEDED FOR MODERATE PAIN, Disp: 90 tablet, Rfl: 1  •  omeprazole (priLOSEC) 40 MG capsule, TAKE 1 CAPSULE EVERY  "DAY, Disp: 90 capsule, Rfl: 1  •  ondansetron (ZOFRAN) 4 MG tablet, TAKE 1 TABLET TWICE DAILY AS NEEDED FOR NAUSEA AND VOMITING, Disp: 60 tablet, Rfl: 0  •  PARoxetine (PAXIL) 30 MG tablet, TAKE 1 TABLET EVERY DAY, Disp: 90 tablet, Rfl: 1  •  Diclofenac Sodium (VOLTAREN) 1 % gel gel, Apply 4 g topically to the appropriate area as directed 4 (Four) Times a Day As Needed (arthritis) for up to 30 days., Disp: 100 g, Rfl: 2    Allergies:  Codeine, Hydroxyzine, and Oxycodone-acetaminophen      Objective   Vital Signs:   Vitals:    10/27/22 0802   BP: 135/78   BP Location: Left arm   Patient Position: Sitting   Pulse: 68   Temp: 98 °F (36.7 °C)   TempSrc: Oral   Weight: 71.1 kg (156 lb 12.8 oz)   Height: 165.1 cm (65\")       Physical Exam  Constitutional:       Appearance: Normal appearance.   HENT:      Head: Normocephalic and atraumatic.   Eyes:      Extraocular Movements: Extraocular movements intact.      Conjunctiva/sclera: Conjunctivae normal.   Pulmonary:      Effort: Pulmonary effort is normal. No respiratory distress.   Musculoskeletal:         General: Tenderness (Left popliteal space) present. No swelling or deformity. Normal range of motion.      Right lower leg: No edema.      Left lower leg: No edema.   Skin:     General: Skin is warm and dry.   Neurological:      General: No focal deficit present.      Mental Status: She is alert and oriented to person, place, and time.   Psychiatric:         Mood and Affect: Mood normal.         Behavior: Behavior normal.         Thought Content: Thought content normal.         Judgment: Judgment normal.          Lab Results   Component Value Date    GLUCOSE 88 09/29/2022    BUN 16 09/29/2022    CREATININE 1.07 (H) 09/29/2022    EGFRIFNONA 60 01/12/2022    EGFRIFAFRI 69 01/12/2022    BCR 15.0 09/29/2022    K 4.1 09/29/2022    CO2 28.7 09/29/2022    CALCIUM 9.1 09/29/2022    PROTENTOTREF 6.6 01/12/2022    ALBUMIN 4.10 09/29/2022    LABIL2 2.0 01/12/2022    AST 22 09/29/2022 "    ALT 17 09/29/2022       Lab Results   Component Value Date    CHOL 147 09/29/2022    CHLPL 165 01/12/2022    TRIG 129 09/29/2022    HDL 63 (H) 09/29/2022    LDL 62 09/29/2022            Assessment and Plan    Diagnoses and all orders for this visit:    1. Breast pain, left (Primary)  Assessment & Plan:  Luisa has persistent left breast pain and tenderness.  She had recent diagnostic bilateral mammo with left breast ultrasound that was unremarkable except for chronic silicone breast implant rupture.  She had breast augmentation done almost 40 years ago.  No problems with the right breast.  Given the ongoing pain, I am going to get her in with Plastic Surgery Dr. Borges for further evaluation.  Referral placed.    Orders:  -     Ambulatory Referral to Plastic Surgery    2. History of augmentation of both breasts  -     Ambulatory Referral to Plastic Surgery    3. Breast implant rupture, initial encounter  -     Ambulatory Referral to Plastic Surgery    4. Primary osteoarthritis of both knees  Assessment & Plan:  She continues to have left knee pain, worsened since the recent injections in the knee.  She has been reportedly diagnosed with osteoarthritic cyst in the popliteal space of the left knee.  She has been applying heat, so I have encouraged her to stop the heat and instead use ice for 10 to 20 minutes 1-2 times daily.  She also has some diclofenac gel that she got from her sister and that has given her some relief when she uses it at bedtime.  She may use it up to 4 times a day and I will send her in a prescription of her own to use.    Orders:  -     Diclofenac Sodium (VOLTAREN) 1 % gel gel; Apply 4 g topically to the appropriate area as directed 4 (Four) Times a Day As Needed (arthritis) for up to 30 days.  Dispense: 100 g; Refill: 2      Follow Up   Return if symptoms worsen or fail to improve, for Or as scheduled 1/10/2023.  Patient was given instructions and counseling regarding her condition or for  health maintenance advice. Please see specific information pulled into the AVS if appropriate.

## 2022-10-27 NOTE — ASSESSMENT & PLAN NOTE
Luisa has persistent left breast pain and tenderness.  She had recent diagnostic bilateral mammo with left breast ultrasound that was unremarkable except for chronic silicone breast implant rupture.  She had breast augmentation done almost 40 years ago.  No problems with the right breast.  Given the ongoing pain, I am going to get her in with Plastic Surgery Dr. Borges for further evaluation.  Referral placed.

## 2022-10-31 ENCOUNTER — TELEPHONE (OUTPATIENT)
Dept: FAMILY MEDICINE CLINIC | Age: 72
End: 2022-10-31

## 2022-10-31 NOTE — TELEPHONE ENCOUNTER
Caller: Randy Luisa    Relationship: Self    Best call back number: [phone]     What form or medical record are you requesting: EXEMPTION FROM JURY DUTY    Who is requesting this form or medical record from you:  FOR [facility]    How would you like to receive the form or medical records (pick-up, mail, fax):     If pick-up, provide patient with address and location details    Timeframe paperwork needed: ASAP    Additional notes: PATIENT CALLED REQUESTING DOCUMENTATION THAT SHE IS UNABLE TO FULFILL HER JURY DUTY DUE TO HER HEALTH.

## 2022-11-07 NOTE — PROGRESS NOTES
Consult (New patient Breast Pain/Implant Rupture)            History of Present Illness  Luisa Padilla is a 72 y.o. female who presents to [facility] as a consult from Dr. Harris bilateral breast pain, prepectoral silicone implant rupture. They were placed cosmetically in [id number]. Wanting implant removal and wanting more implants placed. We will need PCP clearance for hypertension. Non smoker.    Examination: Bilateral Digital Diagnostic Mammogram with Tomosynthesis and CAD and Limited Left Breast Ultrasound.     Date: 10/12/2022     Indication: Patient is a 72 year old female. The patient is seen for pain in the left breast. There are bilateral pre-pectoral silicone gel implants.  The patient has a history of bilateral implants/ augmentation at age 33. The patient has the following   family history of breast cancer:  mother, at age 57, breast cancer.     Comparison: The present examination has been compared to prior imaging studies performed at [facility] on 10/20/2014 and 10/26/2015, and at [facility] on 12/05/2016, 02/07/2017, 09/05/2019, 10/04/2021 and 03/17/2022.       Findings:   Bilateral Mammogram: There are scattered areas of fibroglandular density.  No suspicious abnormality identified in either breast.     Bilateral prepectoral silicone implants again noted. There is persistent lobulated contour of both implants characteristic of compromised implant integrity, unchanged compared to prior exams. There are a few small foci of extracapsular silicone noted   anterior to the left implant on the MLO view.     Left breast ultrasound: Sonographic evaluation over the area of concern indicated by the patient was performed by the sonographer and myself. Survey over the lateral aspect in the area of pain demonstrates normal breast tissue. There is a lobulated   contour of the underlying implant snowstorm effect consistent with  "compromised implant integrity. No suspicious abnormality visualized.     Additionally the patient reports changes of the left nipple. There is slitlike indentation of the nipple. The color is slightly darker than the right nipple. Survey of the subareolar region demonstrates normal breast tissue. There is normal echotexture   of the left nipple.     Impression:   1. No evidence of malignancy in either breast.   2. Bilateral silicone implant rupture, chronic.         Subjective       Codeine, Hydroxyzine, and Oxycodone-acetaminophen  Allergies Reconciled.    Review of Systems    All review of system has been reviewed and it  is negative except the ones note above.     Objective     /85 (BP Location: Left arm)   Pulse 76   Temp 98.9 °F (37.2 °C) (Temporal)   Ht 163.8 cm (64.5\")   Wt 72.2 kg (159 lb 3.2 oz)   SpO2 96%   BMI 26.90 kg/m²     Body mass index is 26.9 kg/m².    Physical Exam  Masses: No masses  Nipple Discharge: No nipple discharge  Breast Symmetry:  Axillary Lymphadenopathy: No axillary lymphadenopathy  SN-N (Right Breast): 28  SN-N (Left Breast): 26  N-IMF (Right Breast): 11  N-IMF (Left Breast): 11  Base Width (Right Breast): 12  Base Width (Left Breast): 12  Schnur Scale Score:  Body surface area is 1.79 meters squared.     Right breast has more ptosis than left. Pain and tenderness when implants are displaced, more pain over left breast on lateral side and above nipple. Well healed breat augmentation scars over IMF    Result Review :       Procedures  Split-recon-implant removal for breast pain with rupture and cos- placement of new implants 2hr.  Cos-implant removal with placement of new implants 2hr.    Assessment and Plan      Diagnoses and all orders for this visit:    1. Breast pain (Primary)    2. Rupture of implant of left breast, initial encounter        Plan:  I discussed with the patient removing the implants and then potential options afterwards including #1 remove the implants " and do nothing, which would leave her with a significantly deflated breast #2 remove the implants and replace with new implants but her ptosis would remain. #3 remove the implants and do a lift, which would require additional scars on the breast.  The patient is having pain and tenderness at this time and is unsure what she wants to do.  I have counseled her that I do think the implants need to be removed but the subsequent step with regards to what we do once we remove the implants is up to the patient's and each has their own risks and benefits involved.  We will need PCP surgical clearance prior to proceeding with surgery.  We will add to surgery list and reach out to her once we decide on surgery date.  Patient notes she is fine with either surgeon.    The patient was given literature in regards to the procedure and encouraged to visit the websites of [employer] and [employer].  Pictures obtained, quote for implant replacement    Patient would be a candidate for breast augmentation.  We discussed possible placement of  Bilateral silicone implants via IMF incision. Asymmetries discussed. Patient understands that this will likely be exaggerated with augmentation and is amenable to this plan. The patient was made aware that the FDA has discovered rare occurrences between an uncommon form of lymphoma (anaplastic large cell) and women with breast implants.  While the incidence is quite low, the risk of development is real; therefore, any unusual symptoms or signs (most commonly a late fluid collection years later) should be brought to the attention of your physician.  Patient also counseled on risks and benefits of saline versus silicone implants, lifting restrictions, scar placement, risk of capsular contracture, animation deformity, need for likely revision of breast implants at some point in her life, FDA recommendation of MRI every three years to monitor for rupture, and continued mammography for breast cancer  surveillance.    Discussed she could proceed with a split bill because insurance will not pay for lift or implant replacement even if they cover removal. She will let us know how she wants to proceed.   She does not want a lift or fat grafting.            CPT  17185    Scribed by KANNAN Sanford, acting as a scribe for KANNAN Sanford, 11/15/22 16:21 EST.  KANNAN Sanford's signature on the note affirms that the note adequately documents the care provided.     • Scribed by Shannon Bryson MA, acting as a scribe for KANNAN Sanford, 11/15/22 14:18 EST.  KANNAN Sanford's signature on the note affirms that the note adequately documents the care provided.      Patient was given instructions and counseling regarding her condition. Please see specific information pulled into the AVS if appropriate.     KANNAN Sanford  11/15/2022

## 2022-11-15 ENCOUNTER — TELEPHONE (OUTPATIENT)
Dept: FAMILY MEDICINE CLINIC | Age: 72
End: 2022-11-15

## 2022-11-15 ENCOUNTER — OFFICE VISIT (OUTPATIENT)
Dept: PLASTIC SURGERY | Facility: CLINIC | Age: 72
End: 2022-11-15

## 2022-11-15 VITALS
OXYGEN SATURATION: 96 % | HEART RATE: 76 BPM | TEMPERATURE: 98.9 F | WEIGHT: 159.2 LBS | SYSTOLIC BLOOD PRESSURE: 149 MMHG | BODY MASS INDEX: 26.52 KG/M2 | HEIGHT: 65 IN | DIASTOLIC BLOOD PRESSURE: 85 MMHG

## 2022-11-15 DIAGNOSIS — N64.4 BREAST PAIN: Primary | ICD-10-CM

## 2022-11-15 DIAGNOSIS — T85.43XA RUPTURE OF IMPLANT OF LEFT BREAST, INITIAL ENCOUNTER: ICD-10-CM

## 2022-11-15 PROCEDURE — 99214 OFFICE O/P EST MOD 30 MIN: CPT | Performed by: NURSE PRACTITIONER

## 2022-11-15 NOTE — TELEPHONE ENCOUNTER
Caller: Luisa Padilla    Relationship: Self    Best call back number: [phone]    What is the provider, practice or medical service name: PATIENT STATES THAT THE PLASTIC SURGERY REFERRAL PROVIDER IS BOOKED OUT UNTIL NEXT YEAR. THEY CANNOT CALL PATIENT FOR 2 MONTHS AT LEAST. PATIENT IS REQUESTING A REFERRAL TO DR. VICKI ROCA IN [place]. [phone]

## 2022-11-21 DIAGNOSIS — M17.0 PRIMARY OSTEOARTHRITIS OF BOTH KNEES: ICD-10-CM

## 2022-11-23 ENCOUNTER — TELEPHONE (OUTPATIENT)
Dept: PLASTIC SURGERY | Facility: CLINIC | Age: 72
End: 2022-11-23

## 2022-11-28 ENCOUNTER — TELEPHONE (OUTPATIENT)
Dept: PLASTIC SURGERY | Facility: CLINIC | Age: 72
End: 2022-11-28

## 2022-11-29 ENCOUNTER — TELEPHONE (OUTPATIENT)
Dept: PLASTIC SURGERY | Facility: CLINIC | Age: 72
End: 2022-11-29

## 2022-11-29 NOTE — TELEPHONE ENCOUNTER
I tried calling pt to make apt with Dr Foster but she did not answer and the voice mailbox wasn't set up.

## 2022-11-29 NOTE — TELEPHONE ENCOUNTER
patient called and left voicemail, due to having breast pain would like surgery soon and is fine with seeing Dr. Foster.    Patient stated the correct phone # to call is [phone]

## 2022-12-09 NOTE — PROGRESS NOTES
Follow-up (Implant ruptured )            History of Present Illness  Luisa Padilla is a 72 y.o. female who presents to [facility] as a consult from Dr. Harris bilateral breast pain, prepectoral silicone implant rupture. They were placed cosmetically in [id number]. Wanting implant removal and wanting more implants placed. We will need PCP clearance for hypertension. Non smoker.  The patient is satisfied with the size of her breast.  However the capsular contracture is quite uncomfortable.  She presents for implant exchange with capsulectomy.  She states that she has been told that they were ruptured.  Other than the implant abnormalities, her mammograms are not suspicious for breast cancer.  Patient is not interested in a breast lift.  She states she would like additional breast volume with the implant while still maintaining a natural, even if slightly droopy appearance.    Examination: Bilateral Digital Diagnostic Mammogram with Tomosynthesis and CAD and Limited Left Breast Ultrasound.     Date: 10/12/2022     Indication: Patient is a 72 year old female. The patient is seen for pain in the left breast. There are bilateral pre-pectoral silicone gel implants.  The patient has a history of bilateral implants/ augmentation at age 33. The patient has the following   family history of breast cancer:  mother, at age 57, breast cancer.     Comparison: The present examination has been compared to prior imaging studies performed at [facility] on 10/20/2014 and 10/26/2015, and at [facility] on 12/05/2016, 02/07/2017, 09/05/2019, 10/04/2021 and 03/17/2022.       Findings:   Bilateral Mammogram: There are scattered areas of fibroglandular density.  No suspicious abnormality identified in either breast.     Bilateral prepectoral silicone implants again noted. There is persistent lobulated contour of both implants characteristic of compromised  "implant integrity, unchanged compared to prior exams. There are a few small foci of extracapsular silicone noted   anterior to the left implant on the MLO view.     Left breast ultrasound: Sonographic evaluation over the area of concern indicated by the patient was performed by the sonographer and myself. Survey over the lateral aspect in the area of pain demonstrates normal breast tissue. There is a lobulated   contour of the underlying implant snowstorm effect consistent with compromised implant integrity. No suspicious abnormality visualized.     Additionally the patient reports changes of the left nipple. There is slitlike indentation of the nipple. The color is slightly darker than the right nipple. Survey of the subareolar region demonstrates normal breast tissue. There is normal echotexture   of the left nipple.     Impression:   1. No evidence of malignancy in either breast.   2. Bilateral silicone implant rupture, chronic.         Subjective       Codeine, Hydroxyzine, and Oxycodone-acetaminophen  Allergies Reconciled.    Review of Systems    All review of system has been reviewed and it  is negative except the ones note above.     Objective     /75   Pulse 76   Temp 97.7 °F (36.5 °C)   Ht 163.8 cm (64.5\")   Wt 73.1 kg (161 lb 3.2 oz)   SpO2 95%   BMI 27.24 kg/m²     Body mass index is 27.24 kg/m².    Physical Exam  Masses: No masses  Nipple Discharge: No nipple discharge  Breast Symmetry:  Axillary Lymphadenopathy: No axillary lymphadenopathy  SN-N (Right Breast): 28  SN-N (Left Breast): 26  N-IMF (Right Breast): 11  N-IMF (Left Breast): 11  Base Width (Right Breast): 12  Base Width (Left Breast): 12  Schnur Scale Score:  Body surface area is 1.79 meters squared.     Right breast has more ptosis than left. Pain and tenderness when implants are displaced, more pain over left breast on lateral side and above nipple. Well healed breat augmentation scars over IMF    Result Review : "       Procedures  Split-recon-implant removal for breast pain with rupture and cos- placement of new implants 2hr.  Cos-implant removal with placement of new implants 2hr.    Assessment and Plan      Diagnoses and all orders for this visit:    1. Breast pain, left (Primary)    2. Breast lump on right side at 3 o'clock position    3. Breast implant rupture, initial encounter    4. Capsular contracture of breast implant, initial encounter        Plan:  I discussed with the patient removing the implants and then potential options afterwards including #1 remove the implants and do nothing, which would leave her with a significantly deflated breast #2 remove the implants and replace with new implants but her ptosis would remain. #3 remove the implants and do a lift, which would require additional scars on the breast.  The patient is having pain and tenderness at this time and is unsure what she wants to do.  I have counseled her that I do think the implants need to be removed but the subsequent step with regards to what we do once we remove the implants is up to the patient's and each has their own risks and benefits involved.  We will need PCP surgical clearance prior to proceeding with surgery.  We will add to surgery list and reach out to her once we decide on surgery date.  Patient notes she is fine with either surgeon.  The tentative plan is to order 3 sets of implants, 300, 350, 400.  Intraoperatively, the scar tissue and ruptured implants will be evaluated and a close fit will be placed.  She understands it will not be exact, but it would be fairly close to what she has presently.  The patient was given literature in regards to the procedure and encouraged to visit the websites of [employer] and [employer].  Pictures obtained, quote for implant replacement    Patient would be a candidate for breast augmentation.  We discussed possible placement of  Bilateral silicone implants via IMF incision. Asymmetries discussed. Patient  understands that this will likely be exaggerated with augmentation and is amenable to this plan. The patient was made aware that the FDA has discovered rare occurrences between an uncommon form of lymphoma (anaplastic large cell) and women with breast implants.  While the incidence is quite low, the risk of development is real; therefore, any unusual symptoms or signs (most commonly a late fluid collection years later) should be brought to the attention of your physician.  Patient also counseled on risks and benefits of saline versus silicone implants, lifting restrictions, scar placement, risk of capsular contracture, animation deformity, need for likely revision of breast implants at some point in her life, FDA recommendation of MRI every three years to monitor for rupture, and continued mammography for breast cancer surveillance.    Discussed she could proceed with a split bill because insurance will not pay for lift or implant replacement even if they cover removal. She will let us know how she wants to proceed.   She does not want a lift or fat grafting.            CPT  82034        Patient was given instructions and counseling regarding her condition. Please see specific information pulled into the AVS if appropriate.     Quintin Foster MD  12/15/2022

## 2022-12-12 ENCOUNTER — OFFICE VISIT (OUTPATIENT)
Dept: FAMILY MEDICINE CLINIC | Age: 72
End: 2022-12-12

## 2022-12-12 VITALS
BODY MASS INDEX: 26.52 KG/M2 | TEMPERATURE: 98.4 F | HEIGHT: 65 IN | WEIGHT: 159.2 LBS | DIASTOLIC BLOOD PRESSURE: 62 MMHG | HEART RATE: 83 BPM | SYSTOLIC BLOOD PRESSURE: 131 MMHG | OXYGEN SATURATION: 98 %

## 2022-12-12 DIAGNOSIS — J06.9 VIRAL UPPER RESPIRATORY TRACT INFECTION: Primary | ICD-10-CM

## 2022-12-12 PROCEDURE — 99213 OFFICE O/P EST LOW 20 MIN: CPT | Performed by: NURSE PRACTITIONER

## 2022-12-12 NOTE — PROGRESS NOTES
"Luisa Padilla presents to [facility] with complaint of  URI (X 3 days. DECLINES COVID/FLU TEST )    SUBJECTIVE  URI   This is a new problem. Episode onset: 3 days ago. The problem has been unchanged. There has been no fever. Associated symptoms include coughing and headaches. Pertinent negatives include no chest pain, congestion, ear pain, neck pain, rhinorrhea, sore throat or wheezing. She has tried nothing for the symptoms.    Patient does not want to be tested for flu or COVID today.    OBJECTIVE  Vital Signs:   /62 (BP Location: Right arm, Patient Position: Sitting)   Pulse 83   Temp 98.4 °F (36.9 °C) (Oral)   Ht 163.8 cm (64.5\")   Wt 72.2 kg (159 lb 3.2 oz)   SpO2 98%   BMI 26.90 kg/m²       Physical Exam  Vitals reviewed.   Constitutional:       General: She is not in acute distress.     Appearance: Normal appearance. She is not ill-appearing.   HENT:      Head: Normocephalic and atraumatic.      Right Ear: Tympanic membrane and ear canal normal.      Left Ear: Tympanic membrane and ear canal normal.      Nose: Nose normal.      Right Sinus: No maxillary sinus tenderness or frontal sinus tenderness.      Left Sinus: No maxillary sinus tenderness or frontal sinus tenderness.      Mouth/Throat:      Mouth: Mucous membranes are moist.      Pharynx: Posterior oropharyngeal erythema (mild) present. No oropharyngeal exudate or uvula swelling.   Cardiovascular:      Rate and Rhythm: Normal rate and regular rhythm.      Pulses: Normal pulses.      Heart sounds: Normal heart sounds.   Pulmonary:      Effort: Pulmonary effort is normal.      Breath sounds: Normal breath sounds.   Musculoskeletal:      Cervical back: Neck supple.   Skin:     General: Skin is warm and dry.   Neurological:      General: No focal deficit present.      Mental Status: She is alert and oriented to person, place, and time. Mental status is at baseline.   Psychiatric:         Mood and Affect: Mood " normal.         Behavior: Behavior normal.         Judgment: Judgment normal.          ASSESSMENT AND PLAN:  Diagnoses and all orders for this visit:    1. Viral upper respiratory tract infection (Primary)    -Patient most likely has cold virus, or she could have flu or COVID but she declined testing today (low suspicion for flu or covid though given her only symptom is occasional productive cough)  -Patient is asking for antibiotic  -Patient was educated that her symptoms that she is having seem viral and that antibiotics would not be beneficial   -Patient was encouraged to use over-the-counter cough medication (offered to send [facility] for her but she declines)  -Also discussed use of Mucinex and increasing water to help thin her secretions  -The patient was encouraged to increase rest and fluids. May take Tylenol for pain or fever. If symptoms persist 7 days or longer, come back in to be seen.         Follow Up   Return if symptoms worsen or fail to improve. Patient to notify office with any acute concerns or issues.  Patient verbalizes understanding, agrees with plan of care and has no further questions upon discharge.     Patient was given instructions and counseling regarding her condition or for health maintenance advice. Please see specific information pulled into the AVS if appropriate.

## 2022-12-15 ENCOUNTER — OFFICE VISIT (OUTPATIENT)
Dept: PLASTIC SURGERY | Facility: CLINIC | Age: 72
End: 2022-12-15

## 2022-12-15 ENCOUNTER — PREP FOR SURGERY (OUTPATIENT)
Dept: OTHER | Facility: HOSPITAL | Age: 72
End: 2022-12-15

## 2022-12-15 VITALS
HEIGHT: 65 IN | OXYGEN SATURATION: 95 % | BODY MASS INDEX: 26.86 KG/M2 | DIASTOLIC BLOOD PRESSURE: 75 MMHG | SYSTOLIC BLOOD PRESSURE: 146 MMHG | WEIGHT: 161.2 LBS | TEMPERATURE: 97.7 F | HEART RATE: 76 BPM

## 2022-12-15 DIAGNOSIS — T85.43XA BREAST IMPLANT RUPTURE, INITIAL ENCOUNTER: ICD-10-CM

## 2022-12-15 DIAGNOSIS — N63.15 BREAST LUMP ON RIGHT SIDE AT 3 O'CLOCK POSITION: ICD-10-CM

## 2022-12-15 DIAGNOSIS — N64.4 BREAST PAIN, LEFT: Primary | ICD-10-CM

## 2022-12-15 DIAGNOSIS — Z03.89 ENCOUNTER FOR OBSERVATION FOR OTHER SUSPECTED DISEASES AND CONDITIONS RULED OUT: ICD-10-CM

## 2022-12-15 DIAGNOSIS — T85.44XA CAPSULAR CONTRACTURE OF BREAST IMPLANT, INITIAL ENCOUNTER: ICD-10-CM

## 2022-12-15 PROCEDURE — 99214 OFFICE O/P EST MOD 30 MIN: CPT | Performed by: SURGERY

## 2022-12-15 RX ORDER — CEFAZOLIN SODIUM 2 G/100ML
2 INJECTION, SOLUTION INTRAVENOUS ONCE
Status: CANCELLED | OUTPATIENT
Start: 2022-12-15 | End: 2022-12-15

## 2022-12-19 ENCOUNTER — OFFICE VISIT (OUTPATIENT)
Dept: FAMILY MEDICINE CLINIC | Age: 72
End: 2022-12-19

## 2022-12-19 VITALS
HEART RATE: 79 BPM | TEMPERATURE: 97.9 F | DIASTOLIC BLOOD PRESSURE: 60 MMHG | SYSTOLIC BLOOD PRESSURE: 123 MMHG | WEIGHT: 160.8 LBS | HEIGHT: 64 IN | BODY MASS INDEX: 27.45 KG/M2

## 2022-12-19 DIAGNOSIS — R05.1 ACUTE COUGH: Primary | ICD-10-CM

## 2022-12-19 PROCEDURE — 99213 OFFICE O/P EST LOW 20 MIN: CPT | Performed by: FAMILY MEDICINE

## 2022-12-19 RX ORDER — DOXYCYCLINE 100 MG/1
100 TABLET ORAL 2 TIMES DAILY
Qty: 10 TABLET | Refills: 0 | Status: SHIPPED | OUTPATIENT
Start: 2022-12-19 | End: 2022-12-24

## 2022-12-19 NOTE — ASSESSMENT & PLAN NOTE
Treating cough with doxycycline course as below due to symptom duration.  Symptomatic care recommended with OTC analgesics for pain/fever, OTC decongestants and cough suppressants prn.  Stay well hydrated.  Humidifier in the bedroom at night.  Hot tea with lemon and honey.  RTC prn worsening or failure to improve of sx.

## 2022-12-19 NOTE — PROGRESS NOTES
"Chief Complaint  Cough (Coughing up green phlegm x 10 days )    Subjective          Luisa Padilla presents to [facility] today for acute complaint of cough for the past 10 days.  She reports negative fatigue and malaise, negative fevers, negative chills, positive headaches, negative rhinorrhea, negative congestion, negative sore throat, negative changes in taste or smell.  She reports positive cough, productive of green sputum, negative chest pain, negative shortness of breath.  She has negative abdominal pain, negative nausea, negative vomiting, negative diarrhea, negative body aches.  Sick contacts:  None.  She has not been vaccinated for flu.  She has received initial COVID vaccination but not a bivalent booster.  She has seen 7 days ago by Romy Cardona (on day #3 of sx) and was diagnosed with URI at that time.  Conservative management was recommended.  Unfortunately, she has not had any improvement since that time.  No worsening either, at least.      Objective   Vital Signs:   Vitals:    12/19/22 1318   BP: 123/60   BP Location: Left arm   Patient Position: Sitting   Pulse: 79   Temp: 97.9 °F (36.6 °C)   TempSrc: Oral   Weight: 72.9 kg (160 lb 12.8 oz)   Height: 163.8 cm (64.49\")     Physical Exam  Vitals reviewed.   Constitutional:       General: She is not in acute distress.     Appearance: Normal appearance.   HENT:      Right Ear: Tympanic membrane, ear canal and external ear normal.      Left Ear: Tympanic membrane, ear canal and external ear normal.      Nose: Congestion and rhinorrhea present.      Right Turbinates: Swollen and pale.      Left Turbinates: Swollen and pale.      Mouth/Throat:      Mouth: Mucous membranes are moist.      Pharynx: Posterior oropharyngeal erythema present.      Tonsils: No tonsillar exudate.   Eyes:      Extraocular Movements: Extraocular movements intact.      Pupils: Pupils are equal, round, and reactive to light.   Cardiovascular:      " Rate and Rhythm: Normal rate and regular rhythm.      Heart sounds: No murmur heard.  Pulmonary:      Effort: Pulmonary effort is normal. No respiratory distress.      Breath sounds: Normal breath sounds. No wheezing, rhonchi or rales.   Abdominal:      General: Bowel sounds are normal.      Tenderness: There is no abdominal tenderness.   Musculoskeletal:         General: Normal range of motion.   Neurological:      Mental Status: She is alert.             Assessment and Plan    Diagnoses and all orders for this visit:    1. Acute cough (Primary)  Assessment & Plan:  Treating cough with doxycycline course as below due to symptom duration.  Symptomatic care recommended with OTC analgesics for pain/fever, OTC decongestants and cough suppressants prn.  Stay well hydrated.  Humidifier in the bedroom at night.  Hot tea with lemon and honey.  RTC prn worsening or failure to improve of sx.    Orders:  -     doxycycline (ADOXA) 100 MG tablet; Take 1 tablet by mouth 2 (Two) Times a Day for 5 days.  Dispense: 10 tablet; Refill: 0       Follow Up   Return if symptoms worsen or fail to improve, for or as scheduled 2/7/2023.  Patient was given instructions and counseling regarding her condition or for health maintenance advice. Please see specific information pulled into the AVS if appropriate.          12/19/2022

## 2022-12-21 ENCOUNTER — TELEPHONE (OUTPATIENT)
Dept: PLASTIC SURGERY | Facility: CLINIC | Age: 72
End: 2022-12-21

## 2022-12-21 NOTE — TELEPHONE ENCOUNTER
Called patient to see if she would like to schedule surgery cosmetically. Patient prefers to submit the implant removal to insurance first. Patient aware we will have to wait to schedule surgery until we are credentialed for Dr. Foster's surgeries with insurance. We will contact patient as soon as we can.

## 2022-12-26 DIAGNOSIS — G89.29 CHRONIC BILATERAL LOW BACK PAIN WITH SCIATICA, SCIATICA LATERALITY UNSPECIFIED: ICD-10-CM

## 2022-12-26 DIAGNOSIS — M54.40 CHRONIC BILATERAL LOW BACK PAIN WITH SCIATICA, SCIATICA LATERALITY UNSPECIFIED: ICD-10-CM

## 2022-12-27 RX ORDER — GABAPENTIN 100 MG/1
CAPSULE ORAL
Qty: 270 CAPSULE | Refills: 2 | Status: SHIPPED | OUTPATIENT
Start: 2022-12-27

## 2022-12-27 RX ORDER — ONDANSETRON 4 MG/1
TABLET, FILM COATED ORAL
Qty: 60 TABLET | Refills: 0 | Status: SHIPPED | OUTPATIENT
Start: 2022-12-27 | End: 2023-01-23

## 2023-01-19 ENCOUNTER — OFFICE VISIT (OUTPATIENT)
Dept: FAMILY MEDICINE CLINIC | Age: 73
End: 2023-01-19
Payer: MEDICARE

## 2023-01-19 ENCOUNTER — HOSPITAL ENCOUNTER (OUTPATIENT)
Dept: GENERAL RADIOLOGY | Facility: HOSPITAL | Age: 73
Discharge: HOME | End: 2023-01-19
Admitting: FAMILY MEDICINE
Payer: MEDICARE

## 2023-01-19 VITALS
BODY MASS INDEX: 27.76 KG/M2 | WEIGHT: 162.6 LBS | DIASTOLIC BLOOD PRESSURE: 79 MMHG | SYSTOLIC BLOOD PRESSURE: 124 MMHG | HEIGHT: 64 IN | HEART RATE: 69 BPM

## 2023-01-19 DIAGNOSIS — K21.9 GASTROESOPHAGEAL REFLUX DISEASE WITHOUT ESOPHAGITIS: ICD-10-CM

## 2023-01-19 DIAGNOSIS — R05.3 CHRONIC COUGH: Primary | ICD-10-CM

## 2023-01-19 DIAGNOSIS — R05.3 CHRONIC COUGH: ICD-10-CM

## 2023-01-19 PROBLEM — R49.0 HOARSENESS: Status: RESOLVED | Noted: 2021-08-20 | Resolved: 2023-01-19

## 2023-01-19 PROCEDURE — 99214 OFFICE O/P EST MOD 30 MIN: CPT | Performed by: FAMILY MEDICINE

## 2023-01-19 PROCEDURE — 71046 X-RAY EXAM CHEST 2 VIEWS: CPT

## 2023-01-19 NOTE — PROGRESS NOTES
Chief Complaint  Cough    Subjective          Luisa Padilla presents to [facility] today for cough.  She has been having cough productive of green sputum.    Symptoms started back in November.  Prior to that, she has had chronic cough to a lesser degree for the past 2 years.  This cough was nonproductive.  No chest pain or shortness of breath.  No fevers or chills.  She does get worn out with the coughing.  Worst in the morning.  By 13:00, the worst of the coughing is over for the day.  Hoarseness is improved.  No rhinorrhea and congestion, no sore throat.    She does take omeprazole at baseline for mild GERD.  No issues there.      Current Outpatient Medications:   •  atorvastatin (LIPITOR) 40 MG tablet, TAKE 1 TABLET EVERY DAY, Disp: 90 tablet, Rfl: 1  •  busPIRone (BUSPAR) 5 MG tablet, TAKE 1 TABLET TWICE DAILY AS NEEDED FOR  ANXIETY, Disp: 180 tablet, Rfl: 1  •  carvedilol (COREG) 3.125 MG tablet, TAKE 1 TABLET EVERY NIGHT, Disp: 90 tablet, Rfl: 1  •  Diclofenac Sodium (VOLTAREN) 1 % gel gel, APPLY 4 GM TOPICALLY TO THE APPROPRIATE AREA AS DIRECTED 4 TIMES A DAY AS NEEDED (ARTHRITIS) FOR UP TO 30 DAYS., Disp: 100 g, Rfl: 2  •  gabapentin (NEURONTIN) 100 MG capsule, TAKE 2 TO 3 CAPSULES 3 TIMES DAILY AS NEEDED, Disp: 270 capsule, Rfl: 2  •  hydrOXYzine (ATARAX) 25 MG tablet, TAKE 1/2 TO 1 TABLET EVERY 6 HOURS DURING THE DAY AND 1 TO 2 TABLETS EVERY NIGHT AS NEEDED FOR ANXIETY, Disp: 180 tablet, Rfl: 1  •  levothyroxine (SYNTHROID, LEVOTHROID) 50 MCG tablet, TAKE 1 TABLET EVERY DAY, Disp: 90 tablet, Rfl: 1  •  meloxicam (MOBIC) 15 MG tablet, TAKE 1 TABLET BY MOUTH DAILY AS NEEDED FOR MODERATE PAIN, Disp: 90 tablet, Rfl: 1  •  omeprazole (priLOSEC) 40 MG capsule, TAKE 1 CAPSULE EVERY DAY, Disp: 90 capsule, Rfl: 1  •  ondansetron (ZOFRAN) 4 MG tablet, TAKE 1 TABLET TWICE DAILY AS NEEDED FOR NAUSEA AND VOMITING, Disp: 60 tablet, Rfl: 0  •  PARoxetine (PAXIL) 30 MG tablet, TAKE 1  "TABLET EVERY DAY, Disp: 90 tablet, Rfl: 1    Allergies:  Codeine, Hydroxyzine, and Oxycodone-acetaminophen      Objective   Vital Signs:   Vitals:    01/19/23 1340   BP: 124/79   BP Location: Left arm   Patient Position: Sitting   Pulse: 69   Weight: 73.8 kg (162 lb 9.6 oz)   Height: 163.8 cm (64.49\")       Physical Exam  Vitals reviewed.   Constitutional:       General: She is not in acute distress.     Appearance: Normal appearance. She is well-developed.   HENT:      Head: Normocephalic and atraumatic.      Right Ear: External ear normal.      Left Ear: External ear normal.   Eyes:      Extraocular Movements: Extraocular movements intact.      Conjunctiva/sclera: Conjunctivae normal.      Pupils: Pupils are equal, round, and reactive to light.   Cardiovascular:      Rate and Rhythm: Normal rate and regular rhythm.      Heart sounds: No murmur heard.  Pulmonary:      Effort: Pulmonary effort is normal.      Breath sounds: Normal breath sounds. No wheezing, rhonchi or rales.   Abdominal:      General: Bowel sounds are normal. There is no distension.      Palpations: Abdomen is soft.      Tenderness: There is no abdominal tenderness.   Musculoskeletal:         General: Normal range of motion.   Neurological:      Mental Status: She is alert.   Psychiatric:         Mood and Affect: Affect normal.          Lab Results   Component Value Date    GLUCOSE 88 09/29/2022    BUN 16 09/29/2022    CREATININE 1.07 (H) 09/29/2022    EGFRIFNONA 60 01/12/2022    EGFRIFAFRI 69 01/12/2022    BCR 15.0 09/29/2022    K 4.1 09/29/2022    CO2 28.7 09/29/2022    CALCIUM 9.1 09/29/2022    PROTENTOTREF 6.6 01/12/2022    ALBUMIN 4.10 09/29/2022    LABIL2 2.0 01/12/2022    AST 22 09/29/2022    ALT 17 09/29/2022       Lab Results   Component Value Date    CHOL 147 09/29/2022    CHLPL 165 01/12/2022    TRIG 129 09/29/2022    HDL 63 (H) 09/29/2022    LDL 62 09/29/2022            Assessment and Plan    Diagnoses and all orders for this " visit:    1. Chronic cough (Primary)  Assessment & Plan:  Chronic cough the past 2 years, acutely worsened for the past 2 months since she had a URI.  She is having some minimal productive sputum.  She has already been treated with antibiotics and steroids but the cough still persists.  I do not see any indication for further antibiotics or steroids at this time, but given the long history of the cough, it does bear further investigating.  She is not complaining of any kind of allergic type symptoms and she is on a PPI at baseline but this has not made any improvement in her cough.  Checking chest x-ray today.  Depending on those results, we may consider proceeding with CT chest versus referral to Pulmonology.  We will contact her when this is available.    Orders:  -     XR Chest PA & Lateral; Future    2. Gastroesophageal reflux disease without esophagitis  Assessment & Plan:  Stable on omeprazole 40 mg daily.  Reflux symptoms have been well controlled.  Has not made a difference to the cough.  No refills needed.        Follow Up   Return if symptoms worsen or fail to improve, for or as scheduled 2/7/2023.  Patient was given instructions and counseling regarding her condition or for health maintenance advice. Please see specific information pulled into the AVS if appropriate.           01/19/2023

## 2023-01-19 NOTE — ASSESSMENT & PLAN NOTE
Chronic cough the past 2 years, acutely worsened for the past 2 months since she had a URI.  She is having some minimal productive sputum.  She has already been treated with antibiotics and steroids but the cough still persists.  I do not see any indication for further antibiotics or steroids at this time, but given the long history of the cough, it does bear further investigating.  She is not complaining of any kind of allergic type symptoms and she is on a PPI at baseline but this has not made any improvement in her cough.  Checking chest x-ray today.  Depending on those results, we may consider proceeding with CT chest versus referral to Pulmonology.  We will contact her when this is available.

## 2023-01-19 NOTE — ASSESSMENT & PLAN NOTE
Stable on omeprazole 40 mg daily.  Reflux symptoms have been well controlled.  Has not made a difference to the cough.  No refills needed.

## 2023-01-23 DIAGNOSIS — F33.1 MAJOR DEPRESSIVE DISORDER, RECURRENT EPISODE, MODERATE DEGREE: ICD-10-CM

## 2023-01-23 DIAGNOSIS — M17.0 PRIMARY OSTEOARTHRITIS OF BOTH KNEES: ICD-10-CM

## 2023-01-23 DIAGNOSIS — F41.9 ANXIETY: ICD-10-CM

## 2023-01-23 RX ORDER — ATORVASTATIN CALCIUM 40 MG/1
TABLET, FILM COATED ORAL
Qty: 90 TABLET | Refills: 1 | Status: SHIPPED | OUTPATIENT
Start: 2023-01-23

## 2023-01-23 RX ORDER — ONDANSETRON 4 MG/1
TABLET, FILM COATED ORAL
Qty: 60 TABLET | Refills: 0 | Status: SHIPPED | OUTPATIENT
Start: 2023-01-23

## 2023-01-23 RX ORDER — CARVEDILOL 3.12 MG/1
TABLET ORAL
Qty: 90 TABLET | Refills: 1 | Status: SHIPPED | OUTPATIENT
Start: 2023-01-23

## 2023-01-23 RX ORDER — LEVOTHYROXINE SODIUM 0.05 MG/1
TABLET ORAL
Qty: 90 TABLET | Refills: 1 | Status: SHIPPED | OUTPATIENT
Start: 2023-01-23

## 2023-01-23 RX ORDER — OMEPRAZOLE 40 MG/1
CAPSULE, DELAYED RELEASE ORAL
Qty: 90 CAPSULE | Refills: 1 | Status: SHIPPED | OUTPATIENT
Start: 2023-01-23

## 2023-01-23 RX ORDER — PAROXETINE 30 MG/1
TABLET, FILM COATED ORAL
Qty: 90 TABLET | Refills: 1 | Status: SHIPPED | OUTPATIENT
Start: 2023-01-23

## 2023-01-24 RX ORDER — BUSPIRONE HYDROCHLORIDE 5 MG/1
TABLET ORAL
Qty: 180 TABLET | Refills: 1 | Status: SHIPPED | OUTPATIENT
Start: 2023-01-24 | End: 2023-02-01

## 2023-01-24 RX ORDER — MELOXICAM 15 MG/1
15 TABLET ORAL DAILY PRN
Qty: 90 TABLET | Refills: 1 | Status: SHIPPED | OUTPATIENT
Start: 2023-01-24

## 2023-01-26 ENCOUNTER — TELEPHONE (OUTPATIENT)
Dept: PLASTIC SURGERY | Facility: CLINIC | Age: 73
End: 2023-01-26
Payer: MEDICARE

## 2023-01-26 NOTE — TELEPHONE ENCOUNTER
PATIENT CALLED ABOUT SCHEDULING FOR SURGERY.  GAVE THE PATIENT HER COSMETIC QUOTE OVER THE PHONE AND TOLD HER SHE WOULD HAVE TO PAY 25% TO SCHEDULE SURGERY. THAT AMOUNT IS $874.50 TO SCHEDULE FOR SURGERY.  THE DATE I OFFERED HER WAS MARCH 1ST,2023.    ALSO TOLD PATIENT THAT THE IMPLANT REMOVAL WOULD BE SUBMITTED TO THE INSURANCE ONCE SURGERY WAS SCHEDULED.  IF THEY DENIED THEN WE WOULD GIVE HER A QUOTE FOR THAT PORTION OF THE SURGERY.    PATIENT ASKED IF THEY COULD THINK ABOUT IT AND CALL US BACK.  TOLD HER SHE COULD AND ASK FOR ME (CAROL).

## 2023-02-01 ENCOUNTER — OFFICE VISIT (OUTPATIENT)
Dept: FAMILY MEDICINE CLINIC | Age: 73
End: 2023-02-01
Payer: MEDICARE

## 2023-02-01 VITALS
DIASTOLIC BLOOD PRESSURE: 72 MMHG | BODY MASS INDEX: 27.49 KG/M2 | WEIGHT: 161 LBS | HEART RATE: 75 BPM | HEIGHT: 64 IN | SYSTOLIC BLOOD PRESSURE: 135 MMHG

## 2023-02-01 DIAGNOSIS — R05.3 CHRONIC COUGH: Primary | ICD-10-CM

## 2023-02-01 DIAGNOSIS — E03.9 ACQUIRED HYPOTHYROIDISM: ICD-10-CM

## 2023-02-01 DIAGNOSIS — I10 ESSENTIAL HYPERTENSION: ICD-10-CM

## 2023-02-01 PROCEDURE — 99214 OFFICE O/P EST MOD 30 MIN: CPT | Performed by: FAMILY MEDICINE

## 2023-02-01 NOTE — ASSESSMENT & PLAN NOTE
Persistent chronic cough.  She is on a PPI.  We have ordered chest x-ray which was unremarkable.  CT of the chest is scheduled for 2/16/2023.  I will go ahead and place some labs today including a CBC and a CMP as it has been about 4 months since we last checked blood work on her.  Continue over-the-counter cough suppressants and her cough syrup as needed.  We are considering a referral to Pulmonology after she has the CT completed.

## 2023-02-01 NOTE — PROGRESS NOTES
"Chief Complaint  Cough    Subjective          Luisa Padilla presents to [facility] today for f/u on cough.    We have been evaluating her for a chronic cough that is less of the past 2 years, acutely worsened over the past 2 to 3 months.  Minimal sputum production.  She has been treated with antibiotic and steroid courses both but the cough still persists.  She had a chest x-ray done at her last visit which was unremarkable.  A CT chest was then ordered for further evaluation.  She is on a PPI daily with omeprazole 40 mg daily and although her reflux symptoms have been well controlled has not made a difference with the cough.    She has been taking Mucinex-DM since last time.  She did notice this morning that she coughed up just a little bit of blood. She is using an OTC allergy med daily.        Objective   Vital Signs:   Vitals:    02/01/23 1421 02/01/23 1505   BP: 142/68 135/72   BP Location: Right arm Right arm   Patient Position: Sitting Sitting   Pulse: 72 75   Weight: 73 kg (161 lb)    Height: 163.8 cm (64.49\")      Physical Exam  Vitals reviewed.   Constitutional:       General: She is not in acute distress.     Appearance: Normal appearance. She is well-developed.   HENT:      Head: Normocephalic and atraumatic.      Right Ear: External ear normal.      Left Ear: External ear normal.   Eyes:      Extraocular Movements: Extraocular movements intact.      Conjunctiva/sclera: Conjunctivae normal.      Pupils: Pupils are equal, round, and reactive to light.   Cardiovascular:      Rate and Rhythm: Normal rate and regular rhythm.      Heart sounds: No murmur heard.  Pulmonary:      Effort: Pulmonary effort is normal.      Breath sounds: Normal breath sounds. No wheezing, rhonchi or rales.   Abdominal:      General: Bowel sounds are normal. There is no distension.      Palpations: Abdomen is soft.      Tenderness: There is no abdominal tenderness.   Musculoskeletal:         " General: Normal range of motion.   Neurological:      Mental Status: She is alert.   Psychiatric:         Mood and Affect: Affect normal.             Assessment and Plan    Diagnoses and all orders for this visit:    1. Chronic cough (Primary)  Assessment & Plan:  Persistent chronic cough.  She is on a PPI.  We have ordered chest x-ray which was unremarkable.  CT of the chest is scheduled for 2/16/2023.  I will go ahead and place some labs today including a CBC and a CMP as it has been about 4 months since we last checked blood work on her.  Continue over-the-counter cough suppressants and her cough syrup as needed.  We are considering a referral to Pulmonology after she has the CT completed.      2. Essential hypertension  Assessment & Plan:  BP at goal.  Stable on carvedilol 3.125 mg BID.  No refills needed.  Checking labs.    Orders:  -     Lipid Panel; Future  -     Comprehensive Metabolic Panel; Future  -     CBC Auto Differential; Future    3. Acquired hypothyroidism  -     TSH; Future       Follow Up   Return if symptoms worsen or fail to improve.  Patient was given instructions and counseling regarding her condition or for health maintenance advice. Please see specific information pulled into the AVS if appropriate.          02/01/2023

## 2023-02-02 ENCOUNTER — LAB (OUTPATIENT)
Dept: LAB | Facility: HOSPITAL | Age: 73
End: 2023-02-02
Payer: MEDICARE

## 2023-02-02 DIAGNOSIS — E03.9 ACQUIRED HYPOTHYROIDISM: ICD-10-CM

## 2023-02-02 DIAGNOSIS — I10 ESSENTIAL HYPERTENSION: ICD-10-CM

## 2023-02-02 LAB
ALBUMIN SERPL-MCNC: 4.2 G/DL (ref 3.5–5.2)
ALBUMIN/GLOB SERPL: 1.8 G/DL
ALP SERPL-CCNC: 97 U/L (ref 39–117)
ALT SERPL W P-5'-P-CCNC: 20 U/L (ref 1–33)
ANION GAP SERPL CALCULATED.3IONS-SCNC: 11 MMOL/L (ref 5–15)
AST SERPL-CCNC: 23 U/L (ref 1–32)
BASOPHILS # BLD AUTO: 0.02 10*3/MM3 (ref 0–0.2)
BASOPHILS NFR BLD AUTO: 0.4 % (ref 0–1.5)
BILIRUB SERPL-MCNC: 0.5 MG/DL (ref 0–1.2)
BUN SERPL-MCNC: 8 MG/DL (ref 8–23)
BUN/CREAT SERPL: 7.3 (ref 7–25)
CALCIUM SPEC-SCNC: 9.7 MG/DL (ref 8.6–10.5)
CHLORIDE SERPL-SCNC: 105 MMOL/L (ref 98–107)
CHOLEST SERPL-MCNC: 183 MG/DL (ref 0–200)
CO2 SERPL-SCNC: 26 MMOL/L (ref 22–29)
CREAT SERPL-MCNC: 1.1 MG/DL (ref 0.57–1)
DEPRECATED RDW RBC AUTO: 47.3 FL (ref 37–54)
EGFRCR SERPLBLD CKD-EPI 2021: 53.8 ML/MIN/1.73
EOSINOPHIL # BLD AUTO: 0.3 10*3/MM3 (ref 0–0.4)
EOSINOPHIL NFR BLD AUTO: 6.4 % (ref 0.3–6.2)
ERYTHROCYTE [DISTWIDTH] IN BLOOD BY AUTOMATED COUNT: 13.9 % (ref 12.3–15.4)
GLOBULIN UR ELPH-MCNC: 2.4 GM/DL
GLUCOSE SERPL-MCNC: 113 MG/DL (ref 65–99)
HCT VFR BLD AUTO: 38.5 % (ref 34–46.6)
HDLC SERPL-MCNC: 55 MG/DL (ref 40–60)
HGB BLD-MCNC: 12.3 G/DL (ref 12–15.9)
IMM GRANULOCYTES # BLD AUTO: 0 10*3/MM3 (ref 0–0.05)
IMM GRANULOCYTES NFR BLD AUTO: 0 % (ref 0–0.5)
LDLC SERPL CALC-MCNC: 103 MG/DL (ref 0–100)
LDLC/HDLC SERPL: 1.82 {RATIO}
LYMPHOCYTES # BLD AUTO: 1.57 10*3/MM3 (ref 0.7–3.1)
LYMPHOCYTES NFR BLD AUTO: 33.6 % (ref 19.6–45.3)
MCH RBC QN AUTO: 29.4 PG (ref 26.6–33)
MCHC RBC AUTO-ENTMCNC: 31.9 G/DL (ref 31.5–35.7)
MCV RBC AUTO: 92.1 FL (ref 79–97)
MONOCYTES # BLD AUTO: 0.52 10*3/MM3 (ref 0.1–0.9)
MONOCYTES NFR BLD AUTO: 11.1 % (ref 5–12)
NEUTROPHILS NFR BLD AUTO: 2.26 10*3/MM3 (ref 1.7–7)
NEUTROPHILS NFR BLD AUTO: 48.5 % (ref 42.7–76)
PLATELET # BLD AUTO: 207 10*3/MM3 (ref 140–450)
PMV BLD AUTO: 9.9 FL (ref 6–12)
POTASSIUM SERPL-SCNC: 4.1 MMOL/L (ref 3.5–5.2)
PROT SERPL-MCNC: 6.6 G/DL (ref 6–8.5)
RBC # BLD AUTO: 4.18 10*6/MM3 (ref 3.77–5.28)
SODIUM SERPL-SCNC: 142 MMOL/L (ref 136–145)
TRIGL SERPL-MCNC: 140 MG/DL (ref 0–150)
TSH SERPL DL<=0.05 MIU/L-ACNC: 0.54 UIU/ML (ref 0.27–4.2)
VLDLC SERPL-MCNC: 25 MG/DL (ref 5–40)
WBC NRBC COR # BLD: 4.67 10*3/MM3 (ref 3.4–10.8)

## 2023-02-02 PROCEDURE — 80061 LIPID PANEL: CPT

## 2023-02-02 PROCEDURE — 36415 COLL VENOUS BLD VENIPUNCTURE: CPT

## 2023-02-02 PROCEDURE — 80053 COMPREHEN METABOLIC PANEL: CPT

## 2023-02-02 PROCEDURE — 84443 ASSAY THYROID STIM HORMONE: CPT

## 2023-02-02 PROCEDURE — 85025 COMPLETE CBC W/AUTO DIFF WBC: CPT

## 2023-02-07 ENCOUNTER — OFFICE VISIT (OUTPATIENT)
Dept: FAMILY MEDICINE CLINIC | Age: 73
End: 2023-02-07
Payer: MEDICARE

## 2023-02-07 VITALS
WEIGHT: 161.6 LBS | HEART RATE: 82 BPM | BODY MASS INDEX: 27.59 KG/M2 | SYSTOLIC BLOOD PRESSURE: 112 MMHG | DIASTOLIC BLOOD PRESSURE: 73 MMHG | HEIGHT: 64 IN

## 2023-02-07 DIAGNOSIS — E03.9 ACQUIRED HYPOTHYROIDISM: ICD-10-CM

## 2023-02-07 DIAGNOSIS — E78.2 MIXED HYPERLIPIDEMIA: ICD-10-CM

## 2023-02-07 DIAGNOSIS — F33.1 MAJOR DEPRESSIVE DISORDER, RECURRENT EPISODE, MODERATE DEGREE: ICD-10-CM

## 2023-02-07 DIAGNOSIS — R05.3 CHRONIC COUGH: ICD-10-CM

## 2023-02-07 DIAGNOSIS — K21.9 GASTROESOPHAGEAL REFLUX DISEASE WITHOUT ESOPHAGITIS: ICD-10-CM

## 2023-02-07 DIAGNOSIS — Z23 ENCOUNTER FOR IMMUNIZATION: ICD-10-CM

## 2023-02-07 DIAGNOSIS — Z00.00 ANNUAL PHYSICAL EXAM: Primary | ICD-10-CM

## 2023-02-07 DIAGNOSIS — I10 ESSENTIAL HYPERTENSION: ICD-10-CM

## 2023-02-07 PROBLEM — N63.15 BREAST LUMP ON RIGHT SIDE AT 3 O'CLOCK POSITION: Status: RESOLVED | Noted: 2022-03-08 | Resolved: 2023-02-07

## 2023-02-07 PROCEDURE — 1170F FXNL STATUS ASSESSED: CPT | Performed by: FAMILY MEDICINE

## 2023-02-07 PROCEDURE — G0439 PPPS, SUBSEQ VISIT: HCPCS | Performed by: FAMILY MEDICINE

## 2023-02-07 PROCEDURE — 96160 PT-FOCUSED HLTH RISK ASSMT: CPT | Performed by: FAMILY MEDICINE

## 2023-02-07 PROCEDURE — 91312 COVID-19 (PFIZER) BIVALENT BOOSTER 12+YRS: CPT | Performed by: FAMILY MEDICINE

## 2023-02-07 PROCEDURE — 1159F MED LIST DOCD IN RCRD: CPT | Performed by: FAMILY MEDICINE

## 2023-02-07 PROCEDURE — 99214 OFFICE O/P EST MOD 30 MIN: CPT | Performed by: FAMILY MEDICINE

## 2023-02-07 PROCEDURE — 0124A PR ADM SARSCOV2 30MCG/0.3ML BST: CPT | Performed by: FAMILY MEDICINE

## 2023-02-07 NOTE — ASSESSMENT & PLAN NOTE
Overt reflux symptoms seem to be doing well on the omeprazole 40 mg daily but we may consider changing to pantoprazole 40 mg daily once we get the CT chest back next week.

## 2023-02-07 NOTE — ASSESSMENT & PLAN NOTE
She is doing quite well.  She is already weaned herself off the buspirone with no problems.  She is interested in cutting back on the paroxetine as well.  Since I just sent in a prescription for the 30 mg about a month ago, I will have her go down to one half tab assuming it can be easily cut at home.  (If they have any trouble cutting it in half, she will call me and I will send in a prescription for 20 mg instead.)  I will see her back in 3 months for follow-up.

## 2023-02-07 NOTE — PROGRESS NOTES
The ABCs of the Annual Wellness Visit  Subsequent Medicare Wellness Visit    Subjective    Luisa Padilla is a 72 y.o. female who presents for a Subsequent Medicare Wellness Visit.    She is due for colonoscopy, last done 3/2012 and this was normal.  Ten year repeat recommended.  Pap smears are no longer indicated by age and history; s/p hysterectomy.  She is up-to-date on mammogram, last done 10/2022 and this was normal.  She is up-to-date on DEXA, last done 10/2021 and this was normal.  She is up-to-date on Covid (due for bivalent booster) and [id number] (10/2022).  She is due for Shingrix, Td and flu.  She is UTD on routine labs including CBC, HTN panel and TSH.     The following portions of the patient's history were reviewed and   updated as appropriate: allergies, current medications, past family history, past medical history, past social history, past surgical history and problem list.    Compared to one year ago, the patient feels her physical   health is better.    Compared to one year ago, the patient feels her mental   health is better.    Recent Hospitalizations:  She was not admitted to the hospital during the last year.       Current Medical Providers:  Patient Care Team:  Kari Harris MD as PCP - General (Family Medicine)    Outpatient Medications Prior to Visit   Medication Sig Dispense Refill   • atorvastatin (LIPITOR) 40 MG tablet TAKE 1 TABLET EVERY DAY 90 tablet 1   • carvedilol (COREG) 3.125 MG tablet TAKE 1 TABLET EVERY NIGHT 90 tablet 1   • Diclofenac Sodium (VOLTAREN) 1 % gel gel APPLY 4 GM TOPICALLY TO THE APPROPRIATE AREA AS DIRECTED 4 TIMES A DAY AS NEEDED (ARTHRITIS) FOR UP TO 30 DAYS. 100 g 2   • gabapentin (NEURONTIN) 100 MG capsule TAKE 2 TO 3 CAPSULES 3 TIMES DAILY AS NEEDED 270 capsule 2   • hydrOXYzine (ATARAX) 25 MG tablet TAKE 1/2 TO 1 TABLET EVERY 6 HOURS DURING THE DAY AND 1 TO 2 TABLETS EVERY NIGHT AS NEEDED FOR ANXIETY 180 tablet 1   • levothyroxine (SYNTHROID,  LEVOTHROID) 50 MCG tablet TAKE 1 TABLET EVERY DAY 90 tablet 1   • meloxicam (MOBIC) 15 MG tablet TAKE 1 TABLET BY MOUTH DAILY AS NEEDED FOR MODERATE PAIN 90 tablet 1   • omeprazole (priLOSEC) 40 MG capsule TAKE 1 CAPSULE EVERY DAY 90 capsule 1   • ondansetron (ZOFRAN) 4 MG tablet TAKE 1 TABLET TWICE DAILY AS NEEDED FOR NAUSEA AND VOMITING 60 tablet 0   • PARoxetine (PAXIL) 30 MG tablet TAKE 1 TABLET EVERY DAY 90 tablet 1     No facility-administered medications prior to visit.       No opioid medication identified on active medication list. I have reviewed chart for other potential  high risk medication/s and harmful drug interactions in the elderly.          Aspirin is not on active medication list.  Aspirin use is not indicated based on review of current medical condition/s. Risk of harm outweighs potential benefits.  .    Patient Active Problem List   Diagnosis   • Right shoulder pain   • Greater trochanteric bursitis of right hip   • Primary osteoarthritis of right hip   • Anxiety   • Chronic cough   • Gastroesophageal reflux disease without esophagitis   • Essential hypertension   • Mixed hyperlipidemia   • Acquired hypothyroidism   • Irritable bowel syndrome with both constipation and diarrhea   • Obstructive sleep apnea   • Chronic bilateral low back pain with right-sided sciatica   • Major depressive disorder, recurrent episode, moderate degree (HCC)   • Osteopenia of multiple sites   • High risk medication use   • Primary insomnia   • Atrophic vaginitis   • Annual physical exam   • Primary osteoarthritis of both knees   • Primary osteoarthritis of hands, bilateral   • Onychomycosis   • Breast pain, left     Advance Care Planning  Advance Directive is not on file.  ACP discussion was held with the patient during this visit. Patient has an advance directive (not in EMR), copy requested.     Objective    Vitals:    02/07/23 1104   BP: 112/73   BP Location: Left arm   Patient Position: Sitting   Pulse: 82  "  Weight: 73.3 kg (161 lb 9.6 oz)   Height: 163.8 cm (64.49\")     Estimated body mass index is 27.32 kg/m² as calculated from the following:    Height as of this encounter: 163.8 cm (64.49\").    Weight as of this encounter: 73.3 kg (161 lb 9.6 oz).    BMI is >= 25 and <30. (Overweight) The following options were offered after discussion;: exercise counseling/recommendations and nutrition counseling/recommendations      Does the patient have evidence of cognitive impairment? No    Lab Results   Component Value Date    TRIG 140 2023    HDL 55 2023     (H) 2023    VLDL 25 2023        HEALTH RISK ASSESSMENT    Smoking Status:  Social History     Tobacco Use   Smoking Status Former   • Packs/day: 3.00   • Years: 30.00   • Pack years: 90.00   • Types: Cigarettes   • Quit date:    • Years since quittin.1   Smokeless Tobacco Never     Alcohol Consumption:  Social History     Substance and Sexual Activity   Alcohol Use Yes    Comment: socially     Fall Risk Screen:    STEADI Fall Risk Assessment was completed, and patient is at LOW risk for falls.Assessment completed on:2023    Depression Screening:  PHQ-2/PHQ-9 Depression Screening 2023   Little Interest or Pleasure in Doing Things 0-->not at all   Feeling Down, Depressed or Hopeless 0-->not at all   PHQ-9: Brief Depression Severity Measure Score 0       Health Habits and Functional and Cognitive Screening:  Functional & Cognitive Status 2023   Do you have difficulty preparing food and eating? No   Do you have difficulty bathing yourself, getting dressed or grooming yourself? No   Do you have difficulty using the toilet? No   Do you have difficulty moving around from place to place? No   Do you have trouble with steps or getting out of a bed or a chair? Yes   Current Diet Unhealthy Diet   Dental Exam Not up to date   Eye Exam Not up to date   Exercise (times per week) 2 times per week   Current Exercises Include Dancing "   Do you need help using the phone?  No   Are you deaf or do you have serious difficulty hearing?  No   Do you need help with transportation? No   Do you need help shopping? No   Do you need help preparing meals?  No   Do you need help with housework?  No   Do you need help with laundry? No   Do you need help taking your medications? No   Do you need help managing money? No   Do you ever drive or ride in a car without wearing a seat belt? No   Have you felt unusual stress, anger or loneliness in the last month? No   Who do you live with? Spouse   If you need help, do you have trouble finding someone available to you? No   Have you been bothered in the last four weeks by sexual problems? No   Do you have difficulty concentrating, remembering or making decisions? No       Age-appropriate Screening Schedule:  Refer to the list below for future screening recommendations based on patient's age, sex and/or medical conditions. Orders for these recommended tests are listed in the plan section. The patient has been provided with a written plan.    Health Maintenance   Topic Date Due   • TDAP/TD VACCINES (1 - Tdap) Never done   • ZOSTER VACCINE (1 of 2) Never done   • INFLUENZA VACCINE  03/31/2023 (Originally 8/1/2022)   • DXA SCAN  10/04/2023   • LIPID PANEL  02/02/2024   • MAMMOGRAM  10/12/2024                CMS Preventative Services Quick Reference  Risk Factors Identified During Encounter  Immunizations Discussed/Encouraged: Tdap and COVID19  The above risks/problems have been discussed with the patient.  Pertinent information has been shared with the patient in the After Visit Summary.  An After Visit Summary and PPPS were made available to the patient.    Follow Up:   Next Medicare Wellness visit to be scheduled in 1 year.       Additional E&M Note during same encounter follows:  Patient has multiple medical problems which are significant and separately identifiable that require additional work above and beyond the  "Medicare Wellness Visit.      Chief Complaint  Medicare Wellness-subsequent    Subjective        HPI  Luisa Padilla is also being seen today for routine follow-up on chronic issues.    We have been evaluating her for a chronic cough that has gradually worsened over the past 2 years, acutely worsened over the past 2 to 3 months.  Minimal sputum production.  She has been treated with antibiotic and steroid courses both but the cough still persists.  She had a chest x-ray done at her last visit which was unremarkable.  A CT chest was then ordered for further evaluation.  She has that scheduled for 2/16/2023. She is on a PPI daily with omeprazole 40 mg daily and although her reflux symptoms have been well controlled has not made a difference with the cough.  Cough is frequently worse after eating.    She is back on meloxicam for use osteoarthritis pain.  She does use gabapentin for chronic low back pain with RLE sciatica as well.  Tox screen is up-to-date, last done 6/2022 and this was appropriate.  Has used diclofenac and methocarbamol previously.  She is following with Dr. Smith at [facility] and has had Synvisc injections in the knees.      She is on paroxetine for depression and insomnia.  Mood has been \"fine.\"  She has used clonazepam in the past but has not taken that in over a year now.  She would actually like to try getting down off of the Paxil as she is not sure that she needs this anymore either.     She is on carvedilol for HTN.  Her  BP has been well controlled.  No chest pain, shortness of breath, or palpitations.     She is on levothyroxine for hypothyroidism.  No cold/heat intolerance, changes in hair or skin.        She is on atorvastation for HLD.  Denies myalgias.        She is on omeprazole for GERD.  She denies reflux or indigestion.  She has prn Zofran in case nausea flares up.    Follows with Dr. Boo LAWSON.    Review of Systems   Constitutional: Negative for chills, fatigue and " "fever.   HENT: Negative for congestion, hearing loss and rhinorrhea.    Eyes: Negative for pain and visual disturbance.   Respiratory: Negative for cough and shortness of breath.    Cardiovascular: Negative for chest pain and palpitations.   Gastrointestinal: Negative for abdominal pain, constipation, diarrhea, nausea and vomiting.   Genitourinary: Negative for difficulty urinating and dysuria.   Musculoskeletal: Negative for arthralgias and myalgias.   Neurological: Negative for weakness and numbness.   Psychiatric/Behavioral: Negative for dysphoric mood and sleep disturbance. The patient is not nervous/anxious.        Objective   Vital Signs:  /73 (BP Location: Left arm, Patient Position: Sitting)   Pulse 82   Ht 163.8 cm (64.49\")   Wt 73.3 kg (161 lb 9.6 oz)   BMI 27.32 kg/m²     Physical Exam  Vitals reviewed.   Constitutional:       General: She is not in acute distress.     Appearance: Normal appearance. She is well-developed.   HENT:      Head: Normocephalic and atraumatic.      Right Ear: External ear normal.      Left Ear: External ear normal.      Mouth/Throat:      Mouth: Mucous membranes are moist.   Eyes:      Extraocular Movements: Extraocular movements intact.      Conjunctiva/sclera: Conjunctivae normal.      Pupils: Pupils are equal, round, and reactive to light.   Cardiovascular:      Rate and Rhythm: Normal rate and regular rhythm.      Heart sounds: No murmur heard.  Pulmonary:      Effort: Pulmonary effort is normal.      Breath sounds: Normal breath sounds. No wheezing, rhonchi or rales.   Abdominal:      General: Bowel sounds are normal. There is no distension.      Palpations: Abdomen is soft.      Tenderness: There is no abdominal tenderness.   Musculoskeletal:         General: Normal range of motion.   Skin:     General: Skin is warm and dry.   Neurological:      Mental Status: She is alert and oriented to person, place, and time.      Deep Tendon Reflexes: Reflexes normal. "   Psychiatric:         Mood and Affect: Mood and affect normal.         Behavior: Behavior normal.         Thought Content: Thought content normal.         Judgment: Judgment normal.                    Lab Results   Component Value Date    GLUCOSE 113 (H) 02/02/2023    BUN 8 02/02/2023    CREATININE 1.10 (H) 02/02/2023    EGFR 53.8 (L) 02/02/2023    BCR 7.3 02/02/2023    K 4.1 02/02/2023    CO2 26.0 02/02/2023    CALCIUM 9.7 02/02/2023    PROTENTOTREF 6.6 01/12/2022    ALBUMIN 4.2 02/02/2023    LABIL2 2.0 01/12/2022    AST 23 02/02/2023    ALT 20 02/02/2023     Lab Results   Component Value Date    CHOL 183 02/02/2023    CHLPL 165 01/12/2022    TRIG 140 02/02/2023    HDL 55 02/02/2023     (H) 02/02/2023          Assessment and Plan   Diagnoses and all orders for this visit:    1. Annual physical exam (Primary)  Assessment & Plan:  She is due for colonoscopy, last done 3/2012 and this was normal.  Ten year repeat recommended.  We are waiting to get her CT chest back next week to determine if she will need an EGD as well as the colonoscopy.  Pap smears are no longer indicated by age and history; s/p hysterectomy.  She is up-to-date on mammogram, last done 10/2022 and this was normal.  She is up-to-date on DEXA, last done 10/2021 and this was normal.  She is up-to-date on Covid (due for bivalent booster - given today) and [id number] (10/2022).  She is due for Shingrix, Td and flu.  Declines flu shot.  Shingrix and Tdap can be done at the pharmacy.  She is UTD on routine labs including CBC, HTN panel and TSH.      2. Chronic cough  Assessment & Plan:  Ongoing.  She is scheduled for CT chest next week on the 16th for further evaluation.  Chest x-ray came back negative.  She has been on omeprazole 40 mg daily with no improvement in her symptoms.  She does notice the coughing spells are worse after eating.  Will take neck steps after CT comes back depending on what that shows.  We may want to consider sending her for  an EGD and or changing her from omeprazole to pantoprazole.  We will also consider doing a referral to Pulmonology for further evaluation.      3. Major depressive disorder, recurrent episode, moderate degree (HCC)  Assessment & Plan:  She is doing quite well.  She is already weaned herself off the buspirone with no problems.  She is interested in cutting back on the paroxetine as well.  Since I just sent in a prescription for the 30 mg about a month ago, I will have her go down to one half tab assuming it can be easily cut at home.  (If they have any trouble cutting it in half, she will call me and I will send in a prescription for 20 mg instead.)  I will see her back in 3 months for follow-up.      4. Essential hypertension  Assessment & Plan:  Stable on carvedilol 3.125 mg twice daily.  No refills needed today.  Labs are reviewed and UTD.      5. Mixed hyperlipidemia  Assessment & Plan:  Stable on atorvastatin 40 mg daily.  No refills needed.  Labs reviewed and up-to-date.      6. Acquired hypothyroidism  Assessment & Plan:  Stable on levothyroxine 50 mcg daily.  No refills needed.  Labs are reviewed and up-to-date.      7. Gastroesophageal reflux disease without esophagitis  Assessment & Plan:  Overt reflux symptoms seem to be doing well on the omeprazole 40 mg daily but we may consider changing to pantoprazole 40 mg daily once we get the CT chest back next week.             Follow Up   Return in about 3 months (around 5/7/2023) for Recheck.  Patient was given instructions and counseling regarding her condition or for health maintenance advice. Please see specific information pulled into the AVS if appropriate.

## 2023-02-07 NOTE — ASSESSMENT & PLAN NOTE
Ongoing.  She is scheduled for CT chest next week on the 16th for further evaluation.  Chest x-ray came back negative.  She has been on omeprazole 40 mg daily with no improvement in her symptoms.  She does notice the coughing spells are worse after eating.  Will take neck steps after CT comes back depending on what that shows.  We may want to consider sending her for an EGD and or changing her from omeprazole to pantoprazole.  We will also consider doing a referral to Pulmonology for further evaluation.

## 2023-02-07 NOTE — ASSESSMENT & PLAN NOTE
She is due for colonoscopy, last done 3/2012 and this was normal.  Ten year repeat recommended.  We are waiting to get her CT chest back next week to determine if she will need an EGD as well as the colonoscopy.  Pap smears are no longer indicated by age and history; s/p hysterectomy.  She is up-to-date on mammogram, last done 10/2022 and this was normal.  She is up-to-date on DEXA, last done 10/2021 and this was normal.  She is up-to-date on Covid (due for bivalent booster - given today) and [id number] (10/2022).  She is due for Shingrix, Td and flu.  Declines flu shot.  Shingrix and Tdap can be done at the pharmacy.  She is UTD on routine labs including CBC, HTN panel and TSH.

## 2023-02-16 ENCOUNTER — HOSPITAL ENCOUNTER (OUTPATIENT)
Dept: CT IMAGING | Facility: HOSPITAL | Age: 73
Discharge: HOME | End: 2023-02-16
Admitting: FAMILY MEDICINE
Payer: MEDICARE

## 2023-02-16 DIAGNOSIS — R05.3 CHRONIC COUGH: ICD-10-CM

## 2023-02-16 PROCEDURE — 0 IOPAMIDOL PER 1 ML: Performed by: FAMILY MEDICINE

## 2023-02-16 PROCEDURE — 71260 CT THORAX DX C+: CPT

## 2023-02-16 RX ADMIN — IOPAMIDOL 100 ML: 755 INJECTION, SOLUTION INTRAVENOUS at 14:19

## 2023-02-21 ENCOUNTER — TELEMEDICINE (OUTPATIENT)
Dept: FAMILY MEDICINE CLINIC | Age: 73
End: 2023-02-21
Payer: MEDICARE

## 2023-02-21 VITALS — HEIGHT: 64 IN | BODY MASS INDEX: 27.66 KG/M2 | WEIGHT: 162 LBS

## 2023-02-21 DIAGNOSIS — R05.3 CHRONIC COUGH: ICD-10-CM

## 2023-02-21 DIAGNOSIS — R91.1 PULMONARY NODULE 1 CM OR GREATER IN DIAMETER: Primary | ICD-10-CM

## 2023-02-21 DIAGNOSIS — U07.1 COVID-19 VIRUS INFECTION: Primary | ICD-10-CM

## 2023-02-21 PROBLEM — J18.9 PNEUMONIA DUE TO INFECTIOUS ORGANISM: Status: ACTIVE | Noted: 2023-02-21

## 2023-02-21 PROBLEM — Z00.00 ANNUAL PHYSICAL EXAM: Status: RESOLVED | Noted: 2021-12-01 | Resolved: 2023-02-21

## 2023-02-21 PROCEDURE — 99213 OFFICE O/P EST LOW 20 MIN: CPT | Performed by: FAMILY MEDICINE

## 2023-02-21 RX ORDER — PROMETHAZINE HYDROCHLORIDE 6.25 MG/5ML
SYRUP ORAL 4 TIMES DAILY PRN
COMMUNITY

## 2023-02-21 RX ORDER — BROMPHENIRAMINE MALEATE, PSEUDOEPHEDRINE HYDROCHLORIDE, AND DEXTROMETHORPHAN HYDROBROMIDE 2; 30; 10 MG/5ML; MG/5ML; MG/5ML
SYRUP ORAL 4 TIMES DAILY PRN
COMMUNITY
End: 2023-02-21

## 2023-02-21 RX ORDER — LEVOFLOXACIN 750 MG/1
750 TABLET ORAL DAILY
Qty: 7 TABLET | Refills: 0 | Status: SHIPPED | OUTPATIENT
Start: 2023-02-21 | End: 2023-02-21

## 2023-02-21 RX ORDER — GUAIFENESIN 600 MG/1
1200 TABLET, EXTENDED RELEASE ORAL 2 TIMES DAILY
COMMUNITY

## 2023-02-21 NOTE — PROGRESS NOTES
"Chief Complaint  Cough (*video visit [phone]* Coughing up green phlegm, headache, body aches x 2 days)     Luisa Padilla has consented to use a telehealth visit for her medical care today: Yes.  Additional staff present for the encounter was Karla Benavidez MA.     This telehealth visit was conducted today via video conference.  I am present at my home in [place] and conducting the visit via [employer] on my phone.  Luisa is present at home and conducting her end of the visit using her smart phone.  Her  Martin accompanies her today.    Subjective          Luisa Padilla presents to [facility] today for acute complaint of cough productive of green sputum for the past 2 days.  She has been battling a chronic cough, but this is different.  She reports positive fatigue and malaise, positive subjective fevers, positive chills, positive headaches, negative rhinorrhea, negative congestion, negative sore throat, negative changes in taste or smell.  She reports positive cough, productive of copious green sputum, positive mild pleuritic chest pain, negative shortness of breath.  She has negative abdominal pain, negative nausea, negative vomiting, negative diarrhea, positive body aches.  Sick contacts:  Martin with cough recently (tested negative with rapid test at home).      She recently had a CT chest done 2/16/23 that shows multiple bilateral ground glass nodules with recommendation for PET scan to further characterize.  Scan also showed mild changes of emphysema.        Objective   Vital Signs:   Vitals:    02/21/23 1314   Weight: 73.5 kg (162 lb)   Height: 163.8 cm (64.49\")     Physical Exam  Constitutional:       Appearance: Normal appearance.   HENT:      Head: Normocephalic and atraumatic.   Eyes:      Extraocular Movements: Extraocular movements intact.      Conjunctiva/sclera: Conjunctivae normal.   Pulmonary:      Effort: Pulmonary effort is normal. No respiratory " distress.   Musculoskeletal:         General: Normal range of motion.   Skin:     General: Skin is warm and dry.   Neurological:      General: No focal deficit present.      Mental Status: She is alert and oriented to person, place, and time.   Psychiatric:         Mood and Affect: Mood normal.         Behavior: Behavior normal.         Thought Content: Thought content normal.         Judgment: Judgment normal.             Assessment and Plan    Diagnoses and all orders for this visit:    1. COVID-19 virus infection (Primary)  Assessment & Plan:  Acute on chronic cough for the past 2 days, productive of green purulent sputum.  Today's visit was telehealth, so I am going to ask her to take a home rapid COVID test.  This did come back POSITIVE.    Symptoms for the past 2 days.  Home test positive for COVID. GFR 53.8.  Med interactions include:  atorvastatin.     Luisa does qualify for Paxlovid.  Paxlovid is approved under EUA or emergency use authorization by the FDA for COVID.  This means that it has not fully completed all of the approval processes but because of the excellent effects that has shown in treating people who have COVID, they have chosen to make it available before the final processes have been cleared.  It does a very good job of keeping people out of the hospital if they are not already sick enough to be admitted.  She does qualify based on risk factors including:  age.  She will take 2 tabs twice daily for 5 days.  Rx will be sent to [facility] pharmacy.  Symptomatic care recommended with OTC analgesics for pain/fever, OTC decongestants and cough suppressants prn.  Stay well hydrated.  Humidifier in the bedroom at night.  Hot tea with lemon and honey. According to CDC guidelines, she should quarantine for until a total of 5 days since symptoms began have passed AND she has gone 24 hours being fever-free without medications AND her other symptoms are improving.  If all of these criteria are  met, then she may come out of quarantine as long as she wears a well-fitting mask in public for another 5 days.  She should contact us or return to clinic if her sx worsen, especially if she develops chest pain, shortness of breath, fevers that do not respond to medications, uncontrollable vomiting, or significant swelling and/or pain in one leg.       Orders:  -     Nirmatrelvir&Ritonavir 300/100 (PAXLOVID) 20 x 150 MG & 10 x 100MG tablet therapy pack tablet; Take 2 tablets by mouth 2 (Two) Times a Day for 5 days. HOLD ATORVASTATIN WHILE TAKING  Dispense: 20 tablet; Refill: 0    2. Chronic cough    Other orders  -     Discontinue: levoFLOXacin (Levaquin) 750 MG tablet; Take 1 tablet by mouth Daily for 5 days.  Dispense: 7 tablet; Refill: 0       Follow Up   Return if symptoms worsen or fail to improve, for or as scheduled 5/10/2023.  Patient was given instructions and counseling regarding her condition or for health maintenance advice. Please see specific information pulled into the AVS if appropriate.          02/21/2023

## 2023-03-09 ENCOUNTER — HOSPITAL ENCOUNTER (OUTPATIENT)
Dept: PET IMAGING | Facility: HOSPITAL | Age: 73
Discharge: HOME | End: 2023-03-09
Payer: MEDICARE

## 2023-03-09 DIAGNOSIS — R91.1 PULMONARY NODULE 1 CM OR GREATER IN DIAMETER: ICD-10-CM

## 2023-03-09 PROCEDURE — 0 FLUDEOXYGLUCOSE F18 SOLUTION: Performed by: FAMILY MEDICINE

## 2023-03-09 PROCEDURE — 78815 PET IMAGE W/CT SKULL-THIGH: CPT

## 2023-03-09 PROCEDURE — A9552 F18 FDG: HCPCS | Performed by: FAMILY MEDICINE

## 2023-03-09 RX ADMIN — FLUDEOXYGLUCOSE F18 1 DOSE: 300 INJECTION INTRAVENOUS at 13:17

## 2023-03-17 ENCOUNTER — TELEPHONE (OUTPATIENT)
Dept: FAMILY MEDICINE CLINIC | Age: 73
End: 2023-03-17
Payer: MEDICARE

## 2023-03-17 NOTE — TELEPHONE ENCOUNTER
----- Message from Kari Harris MD sent at 3/17/2022 10:01 PM EDT -----  Regarding: f/u screening mammo  Please call pt to let her know that she is due for her yearly screening mammogram.  Please pend order for screening mammo and send to me.  Thanks, CHRISTEN

## 2023-03-17 NOTE — TELEPHONE ENCOUNTER
Pt inf re tickle, states she just had a diagnostic mammogram in October and does not want to have another one done right now.

## 2023-03-21 NOTE — TELEPHONE ENCOUNTER
I looked back over the imaging that was done in October, and it is reasonable to hold off for now on repeating her screening mammogram.  We will plan to do a repeat screening mammogram in October again for her unless she has problems before then.  Recall placed.  Thanks, CHRISTEN

## 2023-04-13 ENCOUNTER — OFFICE VISIT (OUTPATIENT)
Dept: FAMILY MEDICINE CLINIC | Age: 73
End: 2023-04-13
Payer: MEDICARE

## 2023-04-13 VITALS
TEMPERATURE: 97.9 F | BODY MASS INDEX: 27.42 KG/M2 | SYSTOLIC BLOOD PRESSURE: 118 MMHG | DIASTOLIC BLOOD PRESSURE: 58 MMHG | HEIGHT: 64 IN | WEIGHT: 160.6 LBS | HEART RATE: 77 BPM

## 2023-04-13 DIAGNOSIS — F33.1 MAJOR DEPRESSIVE DISORDER, RECURRENT EPISODE, MODERATE DEGREE: ICD-10-CM

## 2023-04-13 DIAGNOSIS — M54.41 CHRONIC BILATERAL LOW BACK PAIN WITH BILATERAL SCIATICA: Primary | ICD-10-CM

## 2023-04-13 DIAGNOSIS — F41.8 OTHER SPECIFIED ANXIETY DISORDERS: ICD-10-CM

## 2023-04-13 DIAGNOSIS — M54.42 CHRONIC BILATERAL LOW BACK PAIN WITH BILATERAL SCIATICA: Primary | ICD-10-CM

## 2023-04-13 DIAGNOSIS — G89.29 CHRONIC BILATERAL LOW BACK PAIN WITH BILATERAL SCIATICA: Primary | ICD-10-CM

## 2023-04-13 PROCEDURE — 3078F DIAST BP <80 MM HG: CPT | Performed by: FAMILY MEDICINE

## 2023-04-13 PROCEDURE — 99214 OFFICE O/P EST MOD 30 MIN: CPT | Performed by: FAMILY MEDICINE

## 2023-04-13 PROCEDURE — 3074F SYST BP LT 130 MM HG: CPT | Performed by: FAMILY MEDICINE

## 2023-04-13 PROCEDURE — 1159F MED LIST DOCD IN RCRD: CPT | Performed by: FAMILY MEDICINE

## 2023-04-13 PROCEDURE — 1160F RVW MEDS BY RX/DR IN RCRD: CPT | Performed by: FAMILY MEDICINE

## 2023-04-13 RX ORDER — CYCLOBENZAPRINE HCL 10 MG
10 TABLET ORAL NIGHTLY PRN
Qty: 30 TABLET | Refills: 0 | Status: SHIPPED | OUTPATIENT
Start: 2023-04-13

## 2023-04-13 NOTE — ASSESSMENT & PLAN NOTE
We have been weaning her down on her anxiety meds and antidepressants as she does not feel she needs these any longer.  She is completely off the buspirone at this time.  She has gotten herself down to paroxetine 30 mg tablet 1/2 tablet/day.  Wishes to discontinue that at this time which she may go ahead and do.  Call me if she has any problems.

## 2023-04-13 NOTE — ASSESSMENT & PLAN NOTE
Already on meloxicam and gabapentin at baseline.  She has been using some Flexeril from her sister.  That has helped.  I will get her a prescription for that of her bone.  I also think she would benefit from some physical therapy.  It sounds like she has been working out in the yard and in her home more often than usual lately and probably has aggravated her chronic low back pain.  Referral placed to Physical Therapy as below.  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  Orthopedics is already aware.  I have encouraged her to get back in touch with them to see what next steps they recommend.

## 2023-04-13 NOTE — ASSESSMENT & PLAN NOTE
As per depression plan.   Repair Type: Flap Ear Wedge Repair Text: A wedge excision was completed by carrying down an excision through the full thickness of the ear and cartilage with an inward facing Burow's triangle. The wound was then closed in a layered fashion. Cartilage Fenestration Performed?: No Intermediate Repair Preamble Text (Leave Blank If You Do Not Want): Undermining was performed with blunt dissection. Medical Necessity Statement: Based on my medical judgement, Mohs surgery is the most appropriate treatment for this cancer compared to excision or other treatments. Incorporate Mauc Into Note After Indications: Yes Cheek Interpolation Flap Text: A decision was made to reconstruct the defect utilizing an interpolation axial flap and a staged reconstruction.  A telfa template was made of the defect.  This telfa template was then used to outline the Cheek Interpolation flap.  The donor area for the pedicle flap was then injected with anesthesia.  The flap was excised through the skin and subcutaneous tissue down to the layer of the underlying musculature.  The interpolation flap was carefully excised within this deep plane to maintain its blood supply.  The edges of the donor site were undermined.   The donor site was closed in a primary fashion.  The pedicle was then rotated into position and sutured.  Once the tube was sutured into place, adequate blood supply was confirmed with blanching and refill.  The pedicle was then wrapped with xeroform gauze and dressed appropriately with a telfa and gauze bandage to ensure continued blood supply and protect the attached pedicle. Repair Anesthesia Method: local infiltration No Residual Tumor Seen Histology Text: There were no malignant cells seen in the sections examined. Stage 6: Additional Anesthesia Volume In Cc: 0 Complex Repair And Graft Additional Text (Will Appearing After The Standard Complex Repair Text): The complex repair was not sufficient to completely close the primary defect. The remaining additional defect was repaired with the graft mentioned below. Mercedes Flap Text: The defect edges were debeveled with a #15 scalpel blade.  Given the location of the defect, shape of the defect and the proximity to free margins a Mercedes flap was deemed most appropriate.  Using a sterile surgical marker, an appropriate advancement flap was drawn incorporating the defect and placing the expected incisions within the relaxed skin tension lines where possible. The area thus outlined was incised deep to adipose tissue with a #15 scalpel blade.  The skin margins were undermined to an appropriate distance in all directions utilizing iris scissors. Graft Cartilage Fenestration Text: The cartilage was fenestrated with a 2mm punch biopsy to help facilitate graft survival and healing. Bilobed Transposition Flap Text: The defect edges were debeveled with a #15 scalpel blade.  Given the location of the defect and the proximity to free margins a bilobed transposition flap was deemed most appropriate.  Using a sterile surgical marker, an appropriate bilobe flap drawn around the defect.    The area thus outlined was incised deep to adipose tissue with a #15 scalpel blade.  The skin margins were undermined to an appropriate distance in all directions utilizing iris scissors. Consent (Spinal Accessory)/Introductory Paragraph: The rationale for Mohs was explained to the patient and consent was obtained. The risks, benefits and alternatives to therapy were discussed in detail. Specifically, the risks of damage to the spinal accessory nerve, infection, scarring, bleeding, prolonged wound healing, incomplete removal, allergy to anesthesia, and recurrence were addressed. Prior to the procedure, the treatment site was clearly identified and confirmed by the patient. All components of Universal Protocol/PAUSE Rule completed. Bi-Rhombic Flap Text: The defect edges were debeveled with a #15 scalpel blade.  Given the location of the defect and the proximity to free margins a bi-rhombic flap was deemed most appropriate.  Using a sterile surgical marker, an appropriate rhombic flap was drawn incorporating the defect. The area thus outlined was incised deep to adipose tissue with a #15 scalpel blade.  The skin margins were undermined to an appropriate distance in all directions utilizing iris scissors. Advancement-Rotation Flap Text: The defect edges were debeveled with a #15 scalpel blade.  Given the location of the defect, shape of the defect and the proximity to free margins an advancement-rotation flap was deemed most appropriate.  Using a sterile surgical marker, an appropriate flap was drawn incorporating the defect and placing the expected incisions within the relaxed skin tension lines where possible. The area thus outlined was incised deep to adipose tissue with a #15 scalpel blade.  The skin margins were undermined to an appropriate distance in all directions utilizing iris scissors. Spiral Flap Text: The defect edges were debeveled with a #15 scalpel blade.  Given the location of the defect, shape of the defect and the proximity to free margins a spiral flap was deemed most appropriate.  Using a sterile surgical marker, an appropriate rotation flap was drawn incorporating the defect and placing the expected incisions within the relaxed skin tension lines where possible. The area thus outlined was incised deep to adipose tissue with a #15 scalpel blade.  The skin margins were undermined to an appropriate distance in all directions utilizing iris scissors. Partial Purse String (Intermediate) Text: Given the location of the defect and the characteristics of the surrounding skin an intermediate purse string closure was deemed most appropriate.  Undermining was performed circumfirentially around the surgical defect.  A purse string suture was then placed and tightened. Wound tension only allowed a partial closure of the circular defect. Otolaryngologist Procedure Text (B): After obtaining clear surgical margins the patient was sent to otolaryngology for surgical repair.  The patient understands they will receive post-surgical care and follow-up from the referring physician's office. Mohs Method Verbiage: An incision using a #15 blade following the standard Mohs approach was done and the specimen was harvested as a microscopic controlled layer. Asc Procedure Text (E): After obtaining clear surgical margins the patient was sent to an ASC for surgical repair.  The patient understands they will receive post-surgical care and follow-up from the ASC physician. Graft Donor Site Epidermal Sutures (Optional): 5-0 Prolene O-T Plasty Text: The defect edges were debeveled with a #15 scalpel blade.  Given the location of the defect, shape of the defect and the proximity to free margins an O-T plasty was deemed most appropriate.  Using a sterile surgical marker, an appropriate O-T plasty was drawn incorporating the defect and placing the expected incisions within the relaxed skin tension lines where possible.    The area thus outlined was incised deep to adipose tissue with a #15 scalpel blade.  The skin margins were undermined to an appropriate distance in all directions utilizing iris scissors. Transposition Flap Text: The defect edges were debeveled with a #15 scalpel blade.  Given the location of the defect and the proximity to free margins a transposition flap was deemed most appropriate.  Using a sterile surgical marker, an appropriate transposition flap was drawn incorporating the defect.    The area thus outlined was incised deep to adipose tissue with a #15 scalpel blade.  The skin margins were undermined to an appropriate distance in all directions utilizing iris scissors. W Plasty Text: The lesion was extirpated to the level of the fat with a #15 scalpel blade.  Given the location of the defect, shape of the defect and the proximity to free margins a W-plasty was deemed most appropriate for repair.  Using a sterile surgical marker, the appropriate transposition arms of the W-plasty were drawn incorporating the defect and placing the expected incisions within the relaxed skin tension lines where possible.    The area thus outlined was incised deep to adipose tissue with a #15 scalpel blade.  The skin margins were undermined to an appropriate distance in all directions utilizing iris scissors.  The opposing transposition arms were then transposed into place in opposite direction and anchored with interrupted buried subcutaneous sutures. Stage 11: Additional Anesthesia Type: 1% lidocaine with epinephrine Anesthesia Volume In Cc: 2.5 M-Plasty Complex Repair Preamble Text (Leave Blank If You Do Not Want): Extensive wide undermining was performed. Mid-Level Procedure Text (E): After obtaining clear surgical margins the patient was sent to a mid-level provider for surgical repair.  The patient understands they will receive post-surgical care and follow-up from the mid-level provider. Previous Accession (Optional): [id number] Skin Substitute Text: The defect edges were debeveled with a #15 scalpel blade.  Given the location of the defect, shape of the defect and the proximity to free margins a skin substitute graft was deemed most appropriate.  The graft material was trimmed to fit the size of the defect. The graft was then placed in the primary defect and oriented appropriately. Cheiloplasty (Less Than 50%) Text: A decision was made to reconstruct the defect with a  cheiloplasty.  The defect was undermined extensively.  Additional obicularis oris muscle was excised with a 15 blade scalpel.  The defect was converted into a full thickness wedge, of less than 50% of the vertical height of the lip, to facilite a better cosmetic result.  Small vessels were then tied off with 5-0 monocyrl. The obicularis oris, superficial fascia, adipose and dermis were then reapproximated.  After the deeper layers were approximated the epidermis was reapproximated with particular care given to realign the vermilion border. Dressing: pressure dressing with telfa Mid-Level Procedure Text (B): After obtaining clear surgical margins the patient was sent to a mid-level provider for surgical repair.  The patient understands they will receive post-surgical care and follow-up from the mid-level provider. Purse String (Simple) Text: Given the location of the defect and the characteristics of the surrounding skin a purse string closure was deemed most appropriate.  Undermining was performed circumfirentially around the surgical defect.  A purse string suture was then placed and tightened. Oculoplastic Surgeon Procedure Text (B): After obtaining clear surgical margins the patient was sent to oculoplastics for surgical repair.  The patient understands they will receive post-surgical care and follow-up from the referring physician's office. Referred To Oculoplastics For Closure Text (Leave Blank If You Do Not Want): After obtaining clear surgical margins the patient was sent to oculoplastics for surgical repair.  The patient understands they will receive post-surgical care and follow-up from the reconstructive physician's office. Provider Procedure Text (E): After obtaining clear surgical margins the defect was repaired by another provider. Consent (Nose)/Introductory Paragraph: The rationale for Mohs was explained to the patient and consent was obtained. The risks, benefits and alternatives to therapy were discussed in detail. Specifically, the risks of nasal deformity, changes in the flow of air through the nose, infection, scarring, bleeding, prolonged wound healing, incomplete removal, allergy to anesthesia, nerve injury and recurrence were addressed. Prior to the procedure, the treatment site was clearly identified and confirmed by the patient. All components of Universal Protocol/PAUSE Rule completed. Alar Island Pedicle Flap Text: The defect edges were debeveled with a #15 scalpel blade.  Given the location of the defect, shape of the defect and the proximity to the alar rim an island pedicle advancement flap was deemed most appropriate.  Using a sterile surgical marker, an appropriate advancement flap was drawn incorporating the defect, outlining the appropriate donor tissue and placing the expected incisions within the nasal ala running parallel to the alar rim. The area thus outlined was incised with a #15 scalpel blade.  The skin margins were undermined minimally to an appropriate distance in all directions around the primary defect and laterally outward around the island pedicle utilizing iris scissors.  There was minimal undermining beneath the pedicle flap. Wound Care (No Sutures): Petrolatum Burow's Advancement Flap Text: The defect edges were debeveled with a #15 scalpel blade.  Given the location of the defect and the proximity to free margins a Burow's advancement flap was deemed most appropriate.  Using a sterile surgical marker, the appropriate advancement flap was drawn incorporating the defect and placing the expected incisions within the relaxed skin tension lines where possible.    The area thus outlined was incised deep to adipose tissue with a #15 scalpel blade.  The skin margins were undermined to an appropriate distance in all directions utilizing iris scissors. Island Pedicle Flap With Canthal Suspension Text: The defect edges were debeveled with a #15 scalpel blade.  Given the location of the defect, shape of the defect and the proximity to free margins an island pedicle advancement flap was deemed most appropriate.  Using a sterile surgical marker, an appropriate advancement flap was drawn incorporating the defect, outlining the appropriate donor tissue and placing the expected incisions within the relaxed skin tension lines where possible. The area thus outlined was incised deep to adipose tissue with a #15 scalpel blade.  The skin margins were undermined to an appropriate distance in all directions around the primary defect and laterally outward around the island pedicle utilizing iris scissors.  There was minimal undermining beneath the pedicle flap. A suspension suture was placed in the canthal tendon to prevent tension and prevent ectropion. Oculoplastic Surgeon Procedure Text (A): After obtaining clear surgical margins the patient was sent to oculoplastics for surgical repair.  The patient understands they will receive post-surgical care and follow-up from the referring physician's office. O-Z Plasty Text: The defect edges were debeveled with a #15 scalpel blade.  Given the location of the defect, shape of the defect and the proximity to free margins an O-Z plasty (double transposition flap) was deemed most appropriate.  Using a sterile surgical marker, the appropriate transposition flaps were drawn incorporating the defect and placing the expected incisions within the relaxed skin tension lines where possible.    The area thus outlined was incised deep to adipose tissue with a #15 scalpel blade.  The skin margins were undermined to an appropriate distance in all directions utilizing iris scissors.  Hemostasis was achieved with electrocautery.  The flaps were then transposed into place, one clockwise and the other counterclockwise, and anchored with interrupted buried subcutaneous sutures. Area H Indication Text: Tumors in this location are included in Area H (eyelids, eyebrows, nose, lips, chin, ear, pre-auricular, post-auricular, temple, genitalia, hands, feet, ankles and areola).  Tissue conservation is critical in these anatomic locations. Plastic Surgeon Procedure Text (E): After obtaining clear surgical margins the patient was sent to plastics for surgical repair.  The patient understands they will receive post-surgical care and follow-up from the referring physician's office. Rotation Flap Text: The defect edges were debeveled with a #15 scalpel blade.  Given the location of the defect, shape of the defect and the proximity to free margins a rotation flap was deemed most appropriate.  Using a sterile surgical marker, an appropriate rotation flap was drawn incorporating the defect and placing the expected incisions within the relaxed skin tension lines where possible.    The area thus outlined was incised deep to adipose tissue with a #15 scalpel blade.  The skin margins were undermined to an appropriate distance in all directions utilizing iris scissors. Repair Hemostasis (Optional): Electrocoagulation A-T Advancement Flap Text: The defect edges were debeveled with a #15 scalpel blade.  Given the location of the defect, shape of the defect and the proximity to free margins an A-T advancement flap was deemed most appropriate.  Using a sterile surgical marker, an appropriate advancement flap was drawn incorporating the defect and placing the expected incisions within the relaxed skin tension lines where possible.    The area thus outlined was incised deep to adipose tissue with a #15 scalpel blade.  The skin margins were undermined to an appropriate distance in all directions utilizing iris scissors. Star Wedge Flap Text: The defect edges were debeveled with a #15 scalpel blade.  Given the location of the defect, shape of the defect and the proximity to free margins a star wedge flap was deemed most appropriate.  Using a sterile surgical marker, an appropriate rotation flap was drawn incorporating the defect and placing the expected incisions within the relaxed skin tension lines where possible. The area thus outlined was incised deep to adipose tissue with a #15 scalpel blade.  The skin margins were undermined to an appropriate distance in all directions utilizing iris scissors. Epidermal Sutures: 4-0 Prolene Donor Site Anesthesia Type: same as repair anesthesia Secondary Defect Width In Cm (Required For Flaps): 3.8 Rhombic Flap Text: The defect edges were debeveled with a #15 scalpel blade.  Given the location of the defect and the proximity to free margins a rhombic flap was deemed most appropriate.  Using a sterile surgical marker, an appropriate rhombic flap was drawn incorporating the defect.    The area thus outlined was incised deep to adipose tissue with a #15 scalpel blade.  The skin margins were undermined to an appropriate distance in all directions utilizing iris scissors. Keystone Flap Text: The defect edges were debeveled with a #15 scalpel blade.  Given the location of the defect, shape of the defect a keystone flap was deemed most appropriate.  Using a sterile surgical marker, an appropriate keystone flap was drawn incorporating the defect, outlining the appropriate donor tissue and placing the expected incisions within the relaxed skin tension lines where possible. The area thus outlined was incised deep to adipose tissue with a #15 scalpel blade.  The skin margins were undermined to an appropriate distance in all directions around the primary defect and laterally outward around the flap utilizing iris scissors. Trilobed Flap Text: The defect edges were debeveled with a #15 scalpel blade.  Given the location of the defect and the proximity to free margins a trilobed flap was deemed most appropriate.  Using a sterile surgical marker, an appropriate trilobed flap drawn around the defect.    The area thus outlined was incised deep to adipose tissue with a #15 scalpel blade.  The skin margins were undermined to an appropriate distance in all directions utilizing iris scissors. Otolaryngologist Procedure Text (A): After obtaining clear surgical margins the patient was sent to otolaryngology for surgical repair.  The patient understands they will receive post-surgical care and follow-up from the referring physician's office. Banner Transposition Flap Text: The defect edges were debeveled with a #15 scalpel blade.  Given the location of the defect and the proximity to free margins a Banner transposition flap was deemed most appropriate.  Using a sterile surgical marker, an appropriate flap drawn around the defect. The area thus outlined was incised deep to adipose tissue with a #15 scalpel blade.  The skin margins were undermined to an appropriate distance in all directions utilizing iris scissors. Epidermal Autograft Text: The defect edges were debeveled with a #15 scalpel blade.  Given the location of the defect, shape of the defect and the proximity to free margins an epidermal autograft was deemed most appropriate.  Using a sterile surgical marker, the primary defect shape was transferred to the donor site. The epidermal graft was then harvested.  The skin graft was then placed in the primary defect and oriented appropriately. Consent (Marginal Mandibular)/Introductory Paragraph: The rationale for Mohs was explained to the patient and consent was obtained. The risks, benefits and alternatives to therapy were discussed in detail. Specifically, the risks of damage to the marginal mandibular branch of the facial nerve, infection, scarring, bleeding, prolonged wound healing, incomplete removal, allergy to anesthesia, and recurrence were addressed. Prior to the procedure, the treatment site was clearly identified and confirmed by the patient. All components of Universal Protocol/PAUSE Rule completed. Deep Sutures: 4-0 Vicryl Paramedian Forehead Flap Text: A decision was made to reconstruct the defect utilizing an interpolation axial flap and a staged reconstruction.  A telfa template was made of the defect.  This telfa template was then used to outline the paramedian forehead pedicle flap.  The donor area for the pedicle flap was then injected with anesthesia.  The flap was excised through the skin and subcutaneous tissue down to the layer of the underlying musculature.  The pedicle flap was carefully excised within this deep plane to maintain its blood supply.  The edges of the donor site were undermined.   The donor site was closed in a primary fashion.  The pedicle was then rotated into position and sutured.  Once the tube was sutured into place, adequate blood supply was confirmed with blanching and refill.  The pedicle was then wrapped with xeroform gauze and dressed appropriately with a telfa and gauze bandage to ensure continued blood supply and protect the attached pedicle. Dermal Autograft Text: The defect edges were debeveled with a #15 scalpel blade.  Given the location of the defect, shape of the defect and the proximity to free margins a dermal autograft was deemed most appropriate.  Using a sterile surgical marker, the primary defect shape was transferred to the donor site. The area thus outlined was incised deep to adipose tissue with a #15 scalpel blade.  The harvested graft was then trimmed of adipose and epidermal tissue until only dermis was left.  The skin graft was then placed in the primary defect and oriented appropriately. Asc Procedure Text (C): After obtaining clear surgical margins the patient was sent to an ASC for surgical repair.  The patient understands they will receive post-surgical care and follow-up from the ASC physician. Purse String (Intermediate) Text: Given the location of the defect and the characteristics of the surrounding skin a purse string intermediate closure was deemed most appropriate.  Undermining was performed circumfirentially around the surgical defect.  A purse string suture was then placed and tightened. Complex Repair And Flap Additional Text (Will Appearing After The Standard Complex Repair Text): The complex repair was not sufficient to completely close the primary defect. The remaining additional defect was repaired with the flap mentioned below. Consent Type: Consent 1 (Standard) Z Plasty Text: The lesion was extirpated to the level of the fat with a #15 scalpel blade.  Given the location of the defect, shape of the defect and the proximity to free margins a Z-plasty was deemed most appropriate for repair.  Using a sterile surgical marker, the appropriate transposition arms of the Z-plasty were drawn incorporating the defect and placing the expected incisions within the relaxed skin tension lines where possible.    The area thus outlined was incised deep to adipose tissue with a #15 scalpel blade.  The skin margins were undermined to an appropriate distance in all directions utilizing iris scissors.  The opposing transposition arms were then transposed into place in opposite direction and anchored with interrupted buried subcutaneous sutures. Plastic Surgeon Procedure Text (B): After obtaining clear surgical margins the patient was sent to plastics for surgical repair.  The patient understands they will receive post-surgical care and follow-up from the referring physician's office. Secondary Intention Text (Leave Blank If You Do Not Want): The defect will heal with secondary intention. Eye Protection Verbiage: Before proceeding with the stage, a plastic scleral shield was inserted. The globe was anesthetized with a few drops of 1% lidocaine with 1:100,000 epinephrine. Then, an appropriate sized scleral shield was chosen and coated with lacrilube ointment. The shield was gently inserted and left in place for the duration of each stage. After the stage was completed, the shield was gently removed. Hatchet Flap Text: The defect edges were debeveled with a #15 scalpel blade.  Given the location of the defect, shape of the defect and the proximity to free margins a hatchet flap was deemed most appropriate.  Using a sterile surgical marker, an appropriate hatchet flap was drawn incorporating the defect and placing the expected incisions within the relaxed skin tension lines where possible.    The area thus outlined was incised deep to adipose tissue with a #15 scalpel blade.  The skin margins were undermined to an appropriate distance in all directions utilizing iris scissors. O-T Advancement Flap Text: The defect edges were debeveled with a #15 scalpel blade.  Given the location of the defect, shape of the defect and the proximity to free margins an O-T advancement flap was deemed most appropriate.  Using a sterile surgical marker, an appropriate advancement flap was drawn incorporating the defect and placing the expected incisions within the relaxed skin tension lines where possible.    The area thus outlined was incised deep to adipose tissue with a #15 scalpel blade.  The skin margins were undermined to an appropriate distance in all directions utilizing iris scissors. Closure 4 Information: This tab is for additional flaps and grafts above and beyond our usual structured repairs.  Please note if you enter information here it will not currently bill and you will need to add the billing information manually. Bcc Histology Text: There were numerous aggregates of basaloid cells. Melolabial Interpolation Flap Text: A decision was made to reconstruct the defect utilizing an interpolation axial flap and a staged reconstruction.  A telfa template was made of the defect.  This telfa template was then used to outline the melolabial interpolation flap.  The donor area for the pedicle flap was then injected with anesthesia.  The flap was excised through the skin and subcutaneous tissue down to the layer of the underlying musculature.  The pedicle flap was carefully excised within this deep plane to maintain its blood supply.  The edges of the donor site were undermined.   The donor site was closed in a primary fashion.  The pedicle was then rotated into position and sutured.  Once the tube was sutured into place, adequate blood supply was confirmed with blanching and refill.  The pedicle was then wrapped with xeroform gauze and dressed appropriately with a telfa and gauze bandage to ensure continued blood supply and protect the attached pedicle. Anesthesia Type: 2% lidocaine with epinephrine Dressing (No Sutures): dry sterile dressing No Repair - Repaired With Adjacent Surgical Defect Text (Leave Blank If You Do Not Want): After obtaining clear surgical margins the defect was repaired concurrently with another surgical defect which was in close approximation. Primary Defect Width In Cm (Final Defect Size - Required For Flaps/Grafts): 1.6 Alternatives Discussed Intro (Do Not Add Period): I discussed alternative treatments to Mohs surgery and specifically discussed the risks and benefits of Full Thickness Lip Wedge Repair (Flap) Text: Given the location of the defect and the proximity to free margins a full thickness wedge repair was deemed most appropriate.  Using a sterile surgical marker, the appropriate repair was drawn incorporating the defect and placing the expected incisions perpendicular to the vermilion border.  The vermilion border was also meticulously outlined to ensure appropriate reapproximation during the repair.  The area thus outlined was incised through and through with a #15 scalpel blade.  The muscularis and dermis were reaproximated with deep sutures following hemostasis. Care was taken to realign the vermilion border before proceeding with the superficial closure.  Once the vermilion was realigned the superfical and mucosal closure was finished. S Plasty Text: Given the location and shape of the defect, and the orientation of relaxed skin tension lines, an S-plasty was deemed most appropriate for repair.  Using a sterile surgical marker, the appropriate outline of the S-plasty was drawn, incorporating the defect and placing the expected incisions within the relaxed skin tension lines where possible.  The area thus outlined was incised deep to adipose tissue with a #15 scalpel blade.  The skin margins were undermined to an appropriate distance in all directions utilizing iris scissors. The skin flaps were advanced over the defect.  The opposing margins were then approximated with interrupted buried subcutaneous sutures. Consent (Lip)/Introductory Paragraph: The rationale for Mohs was explained to the patient and consent was obtained. The risks, benefits and alternatives to therapy were discussed in detail. Specifically, the risks of lip deformity, changes in the oral aperture, infection, scarring, bleeding, prolonged wound healing, incomplete removal, allergy to anesthesia, nerve injury and recurrence were addressed. Prior to the procedure, the treatment site was clearly identified and confirmed by the patient. All components of Universal Protocol/PAUSE Rule completed. Inflammation Suggestive Of Cancer Camouflage Histology Text: There was a dense lymphocytic infiltrate which prevented adequate histologic evaluation of adjacent structures. Closure 3 Information: This tab is for additional flaps and grafts above and beyond our usual structured repairs.  Please note if you enter information here it will not currently bill and you will need to add the billing information manually. Stage 1: Number Of Blocks?: 2 Home Suture Removal Text: Patient was provided instructions on removing sutures and will remove their sutures at home.  If they have any questions or difficulties they will call the office. Anesthesia Volume In Cc: 1.5 Island Pedicle Flap-Requiring Vessel Identification Text: The defect edges were debeveled with a #15 scalpel blade.  Given the location of the defect, shape of the defect and the proximity to free margins an island pedicle advancement flap was deemed most appropriate.  Using a sterile surgical marker, an appropriate advancement flap was drawn, based on the axial vessel mentioned above, incorporating the defect, outlining the appropriate donor tissue and placing the expected incisions within the relaxed skin tension lines where possible.    The area thus outlined was incised deep to adipose tissue with a #15 scalpel blade.  The skin margins were undermined to an appropriate distance in all directions around the primary defect and laterally outward around the island pedicle utilizing iris scissors.  There was minimal undermining beneath the pedicle flap. Referred To Plastics For Closure Text (Leave Blank If You Do Not Want): After obtaining clear surgical margins the patient was sent to plastics for surgical repair.  The patient understands they will receive post-surgical care and follow-up from the reconstructive physician's office. Referring Physician (Optional): MD Homar Flap Type: O-T Advancement Flap Epidermal Closure Graft Donor Site (Optional): running horizontal mattress Partial Purse String (Simple) Text: Given the location of the defect and the characteristics of the surrounding skin a simple purse string closure was deemed most appropriate.  Undermining was performed circumfirentially around the surgical defect.  A purse string suture was then placed and tightened. Wound tension only allowed a partial closure of the circular defect. Subsequent Stages Histo Method Verbiage: Using a similar technique to that described above, a thin layer of tissue was removed from all areas where tumor was visible on the previous stage.  The tissue was again oriented, mapped, dyed, and processed as above. Advancement Flap (Single) Text: The defect edges were debeveled with a #15 scalpel blade.  Given the location of the defect and the proximity to free margins a single advancement flap was deemed most appropriate.  Using a sterile surgical marker, an appropriate advancement flap was drawn incorporating the defect and placing the expected incisions within the relaxed skin tension lines where possible.    The area thus outlined was incised deep to adipose tissue with a #15 scalpel blade.  The skin margins were undermined to an appropriate distance in all directions utilizing iris scissors. Composite Graft Text: The defect edges were debeveled with a #15 scalpel blade.  Given the location of the defect, shape of the defect, the proximity to free margins and the fact the defect was full thickness a composite graft was deemed most appropriate.  The defect was outline and then transferred to the donor site.  A full thickness graft was then excised from the donor site. The graft was then placed in the primary defect, oriented appropriately and then sutured into place.  The secondary defect was then repaired using a primary closure. Consent (Temporal Branch)/Introductory Paragraph: The rationale for Mohs was explained to the patient and consent was obtained. The risks, benefits and alternatives to therapy were discussed in detail. Specifically, the risks of damage to the temporal branch of the facial nerve, infection, scarring, bleeding, prolonged wound healing, incomplete removal, allergy to anesthesia, and recurrence were addressed. Prior to the procedure, the treatment site was clearly identified and confirmed by the patient. All components of Universal Protocol/PAUSE Rule completed. Estimated Blood Loss (Cc): minimal Muscle Hinge Flap Text: The defect edges were debeveled with a #15 scalpel blade.  Given the size, depth and location of the defect and the proximity to free margins a muscle hinge flap was deemed most appropriate.  Using a sterile surgical marker, an appropriate hinge flap was drawn incorporating the defect. The area thus outlined was incised with a #15 scalpel blade.  The skin margins were undermined to an appropriate distance in all directions utilizing iris scissors. Location Indication Override (Is Already Calculated Based On Selected Body Location): Area M Number Of Stages: 1 Consent (Scalp)/Introductory Paragraph: The rationale for Mohs was explained to the patient and consent was obtained. The risks, benefits and alternatives to therapy were discussed in detail. Specifically, the risks of changes in hair growth pattern secondary to repair, infection, scarring, bleeding, prolonged wound healing, incomplete removal, allergy to anesthesia, nerve injury and recurrence were addressed. Prior to the procedure, the treatment site was clearly identified and confirmed by the patient. All components of Universal Protocol/PAUSE Rule completed. O-L Flap Text: The defect edges were debeveled with a #15 scalpel blade.  Given the location of the defect, shape of the defect and the proximity to free margins an O-L flap was deemed most appropriate.  Using a sterile surgical marker, an appropriate advancement flap was drawn incorporating the defect and placing the expected incisions within the relaxed skin tension lines where possible.    The area thus outlined was incised deep to adipose tissue with a #15 scalpel blade.  The skin margins were undermined to an appropriate distance in all directions utilizing iris scissors. Ear Star Wedge Flap Text: The defect edges were debeveled with a #15 blade scalpel.  Given the location of the defect and the proximity to free margins (helical rim) an ear star wedge flap was deemed most appropriate.  Using a sterile surgical marker, the appropriate flap was drawn incorporating the defect and placing the expected incisions between the helical rim and antihelix where possible.  The area thus outlined was incised through and through with a #15 scalpel blade. Tissue Cultured Epidermal Autograft Text: The defect edges were debeveled with a #15 scalpel blade.  Given the location of the defect, shape of the defect and the proximity to free margins a tissue cultured epidermal autograft was deemed most appropriate.  The graft was then trimmed to fit the size of the defect.  The graft was then placed in the primary defect and oriented appropriately. Graft Donor Site Dermal Sutures (Optional): 5-0 Vicryl Mucosal Advancement Flap Text: Given the location of the defect, shape of the defect and the proximity to free margins a mucosal advancement flap was deemed most appropriate. Incisions were made with a 15 blade scalpel in the appropriate fashion along the cutaneous vermilion border and the mucosal lip. The remaining actinically damaged mucosal tissue was excised.  The mucosal advancement flap was then elevated to the gingival sulcus with care taken to preserve the neurovascular structures and advanced into the primary defect. Care was taken to ensure that precise realignment of the vermilion border was achieved. Primary Defect Length In Cm (Final Defect Size - Required For Flaps/Grafts): 1.8 Manual Repair Warning Statement: We plan on removing the manually selected variable below in favor of our much easier automatic structured text blocks found in the previous tab. We decided to do this to help make the flow better and give you the full power of structured data. Manual selection is never going to be ideal in our platform and I would encourage you to avoid using manual selection from this point on, especially since I will be sunsetting this feature. It is important that you do one of two things with the customized text below. First, you can save all of the text in a word file so you can have it for future reference. Second, transfer the text to the appropriate area in the Library tab. Lastly, if there is a flap or graft type which we do not have you need to let us know right away so I can add it in before the variable is hidden. No need to panic, we plan to give you roughly 6 months to make the change. Ftsg Text: The defect edges were debeveled with a #15 scalpel blade.  Given the location of the defect, shape of the defect and the proximity to free margins a full thickness skin graft was deemed most appropriate.  Using a sterile surgical marker, the primary defect shape was transferred to the donor site. The area thus outlined was incised deep to adipose tissue with a #15 scalpel blade.  The harvested graft was then trimmed of adipose tissue until only dermis and epidermis was left.  The skin margins of the secondary defect were undermined to an appropriate distance in all directions utilizing iris scissors.  The secondary defect was closed with interrupted buried subcutaneous sutures.  The skin edges were then re-apposed with running  sutures.  The skin graft was then placed in the primary defect and oriented appropriately. Tarsorrhaphy Text: A tarsorrhaphy was performed using Frost sutures. Closure 2 Information: This tab is for additional flaps and grafts, including complex repair and grafts and complex repair and flaps. You can also specify a different location for the additional defect, if the location is the same you do not need to select a new one. We will insert the automated text for the repair you select below just as we do for solitary flaps and grafts. Please note that at this time if you select a location with a different insurance zone you will need to override the ICD10 and CPT if appropriate. V-Y Plasty Text: The defect edges were debeveled with a #15 scalpel blade.  Given the location of the defect, shape of the defect and the proximity to free margins an V-Y advancement flap was deemed most appropriate.  Using a sterile surgical marker, an appropriate advancement flap was drawn incorporating the defect and placing the expected incisions within the relaxed skin tension lines where possible.    The area thus outlined was incised deep to adipose tissue with a #15 scalpel blade.  The skin margins were undermined to an appropriate distance in all directions utilizing iris scissors. Interpolation Flap Text: A decision was made to reconstruct the defect utilizing an interpolation axial flap and a staged reconstruction.  A telfa template was made of the defect.  This telfa template was then used to outline the interpolation flap.  The donor area for the pedicle flap was then injected with anesthesia.  The flap was excised through the skin and subcutaneous tissue down to the layer of the underlying musculature.  The interpolation flap was carefully excised within this deep plane to maintain its blood supply.  The edges of the donor site were undermined.   The donor site was closed in a primary fashion.  The pedicle was then rotated into position and sutured.  Once the tube was sutured into place, adequate blood supply was confirmed with blanching and refill.  The pedicle was then wrapped with xeroform gauze and dressed appropriately with a telfa and gauze bandage to ensure continued blood supply and protect the attached pedicle. Posterior Auricular Interpolation Flap Text: A decision was made to reconstruct the defect utilizing an interpolation axial flap and a staged reconstruction.  A telfa template was made of the defect.  This telfa template was then used to outline the posterior auricular interpolation flap.  The donor area for the pedicle flap was then injected with anesthesia.  The flap was excised through the skin and subcutaneous tissue down to the layer of the underlying musculature.  The pedicle flap was carefully excised within this deep plane to maintain its blood supply.  The edges of the donor site were undermined.   The donor site was closed in a primary fashion.  The pedicle was then rotated into position and sutured.  Once the tube was sutured into place, adequate blood supply was confirmed with blanching and refill.  The pedicle was then wrapped with xeroform gauze and dressed appropriately with a telfa and gauze bandage to ensure continued blood supply and protect the attached pedicle. Mohs Histo Method Verbiage: Each section was then chromacoded and processed in the Mohs lab using the Mohs protocol and submitted for frozen section. Mohs Case Number:  Consent 1/Introductory Paragraph: The rationale for Mohs was explained to the patient and consent was obtained. The risks, benefits and alternatives to therapy were discussed in detail. Specifically, the risks of infection, scarring, bleeding, prolonged wound healing, incomplete removal, allergy to anesthesia, nerve injury and recurrence were addressed. Prior to the procedure, the treatment site was clearly identified and confirmed by the patient. All components of Universal Protocol/PAUSE Rule completed. Bcc Infiltrative Histology Text: There were numerous aggregates of basaloid cells demonstrating an infiltrative pattern. Area L Indication Text: Tumors in this location are included in Area L (trunk and extremities).  Mohs surgery is indicated for larger tumors, or tumors with aggressive histologic features, in these anatomic locations. Dorsal Nasal Flap Text: The defect edges were debeveled with a #15 scalpel blade.  Given the location of the defect and the proximity to free margins a dorsal nasal flap was deemed most appropriate.  Using a sterile surgical marker, an appropriate dorsal nasal flap was drawn around the defect.    The area thus outlined was incised deep to adipose tissue with a #15 scalpel blade.  The skin margins were undermined to an appropriate distance in all directions utilizing iris scissors. Helical Rim Advancement Flap Text: The defect edges were debeveled with a #15 blade scalpel.  Given the location of the defect and the proximity to free margins (helical rim) a double helical rim advancement flap was deemed most appropriate.  Using a sterile surgical marker, the appropriate advancement flaps were drawn incorporating the defect and placing the expected incisions between the helical rim and antihelix where possible.  The area thus outlined was incised through and through with a #15 scalpel blade.  With a skin hook and iris scissors, the flaps were gently and sharply undermined and freed up. Bilobed Flap Text: The defect edges were debeveled with a #15 scalpel blade.  Given the location of the defect and the proximity to free margins a bilobe flap was deemed most appropriate.  Using a sterile surgical marker, an appropriate bilobe flap drawn around the defect.    The area thus outlined was incised deep to adipose tissue with a #15 scalpel blade.  The skin margins were undermined to an appropriate distance in all directions utilizing iris scissors. Localized Dermabrasion Text: The patient was draped in routine manner.  Localized dermabrasion using 3 x 17 mm wire brush was performed in routine manner to papillary dermis. This spot dermabrasion is being performed to complete skin cancer reconstruction. It also will eliminate the other sun damaged precancerous cells that are known to be part of the regional effect of a lifetime's worth of sun exposure. This localized dermabrasion is therapeutic and should not be considered cosmetic in any regard. V-Y Flap Text: The defect edges were debeveled with a #15 scalpel blade.  Given the location of the defect, shape of the defect and the proximity to free margins a V-Y flap was deemed most appropriate.  Using a sterile surgical marker, an appropriate advancement flap was drawn incorporating the defect and placing the expected incisions within the relaxed skin tension lines where possible.    The area thus outlined was incised deep to adipose tissue with a #15 scalpel blade.  The skin margins were undermined to an appropriate distance in all directions utilizing iris scissors. Non-Graft Cartilage Fenestration Text: The cartilage was fenestrated with a 2mm punch biopsy to help facilitate healing. Mauc Instructions: By selecting yes to the question below the MAUC number will be added into the note.  This will be calculated automatically based on the diagnosis chosen, the size entered, the body zone selected (H,M,L) and the specific indications you chose. You will also have the option to override the Mohs AUC if you disagree with the automatically calculated number and this option is found in the Case Summary tab. Cheek-To-Nose Interpolation Flap Text: A decision was made to reconstruct the defect utilizing an interpolation axial flap and a staged reconstruction.  A telfa template was made of the defect.  This telfa template was then used to outline the Cheek-To-Nose Interpolation flap.  The donor area for the pedicle flap was then injected with anesthesia.  The flap was excised through the skin and subcutaneous tissue down to the layer of the underlying musculature.  The interpolation flap was carefully excised within this deep plane to maintain its blood supply.  The edges of the donor site were undermined.   The donor site was closed in a primary fashion.  The pedicle was then rotated into position and sutured.  Once the tube was sutured into place, adequate blood supply was confirmed with blanching and refill.  The pedicle was then wrapped with xeroform gauze and dressed appropriately with a telfa and gauze bandage to ensure continued blood supply and protect the attached pedicle. Same Histology In Subsequent Stages Text: The pattern and morphology of the tumor is as described in the first stage. Detail Level: Detailed Surgeon/Pathologist Verbiage (Will Incorporate Name Of Surgeon From Intro If Not Blank): operated in two distinct and integrated capacities as the surgeon and pathologist. H Plasty Text: Given the location of the defect, shape of the defect and the proximity to free margins a H-plasty was deemed most appropriate for repair.  Using a sterile surgical marker, the appropriate advancement arms of the H-plasty were drawn incorporating the defect and placing the expected incisions within the relaxed skin tension lines where possible. The area thus outlined was incised deep to adipose tissue with a #15 scalpel blade. The skin margins were undermined to an appropriate distance in all directions utilizing iris scissors.  The opposing advancement arms were then advanced into place in opposite direction and anchored with interrupted buried subcutaneous sutures. Postop Diagnosis: same Cheiloplasty (Complex) Text: A decision was made to reconstruct the defect with a  cheiloplasty.  The defect was undermined extensively.  Additional obicularis oris muscle was excised with a 15 blade scalpel.  The defect was converted into a full thickness wedge to facilite a better cosmetic result.  Small vessels were then tied off with 5-0 monocyrl. The obicularis oris, superficial fascia, adipose and dermis were then reapproximated.  After the deeper layers were approximated the epidermis was reapproximated with particular care given to realign the vermilion border. Suture Removal: 7 days Modified Advancement Flap Text: The defect edges were debeveled with a #15 scalpel blade.  Given the location of the defect, shape of the defect and the proximity to free margins a modified advancement flap was deemed most appropriate.  Using a sterile surgical marker, an appropriate advancement flap was drawn incorporating the defect and placing the expected incisions within the relaxed skin tension lines where possible.    The area thus outlined was incised deep to adipose tissue with a #15 scalpel blade.  The skin margins were undermined to an appropriate distance in all directions utilizing iris scissors. Date Of Previous Biopsy (Optional): 8/30/2018 Mohs Rapid Report Verbiage: The area of clinically evident tumor was marked with skin marking ink and appropriately hatched.  The initial incision was made following the Mohs approach through the skin.  The specimen was taken to the lab, divided into the necessary number of pieces, chromacoded and processed according to the Mohs protocol.  This was repeated in successive stages until a tumor free defect was achieved. Unna Boot Text: An Unna boot was placed to help immobilize the limb and facilitate more rapid healing. Cartilage Graft Text: The defect edges were debeveled with a #15 scalpel blade.  Given the location of the defect, shape of the defect, the fact the defect involved a full thickness cartilage defect a cartilage graft was deemed most appropriate.  An appropriate donor site was identified, cleansed, and anesthetized. The cartilage graft was then harvested and transferred to the recipient site, oriented appropriately and then sutured into place.  The secondary defect was then repaired using a primary closure. Consent (Near Eyelid Margin)/Introductory Paragraph: The rationale for Mohs was explained to the patient and consent was obtained. The risks, benefits and alternatives to therapy were discussed in detail. Specifically, the risks of ectropion or eyelid deformity, infection, scarring, bleeding, prolonged wound healing, incomplete removal, allergy to anesthesia, nerve injury and recurrence were addressed. Prior to the procedure, the treatment site was clearly identified and confirmed by the patient. All components of Universal Protocol/PAUSE Rule completed. Consent 3/Introductory Paragraph: I gave the patient a chance to ask questions they had about the procedure.  Following this I explained the Mohs procedure and consent was obtained. The risks, benefits and alternatives to therapy were discussed in detail. Specifically, the risks of infection, scarring, bleeding, prolonged wound healing, incomplete removal, allergy to anesthesia, nerve injury and recurrence were addressed. Prior to the procedure, the treatment site was clearly identified and confirmed by the patient. All components of Universal Protocol/PAUSE Rule completed. Additional Epidermal Closure (Optional): running Melolabial Transposition Flap Text: The defect edges were debeveled with a #15 scalpel blade.  Given the location of the defect and the proximity to free margins a melolabial flap was deemed most appropriate.  Using a sterile surgical marker, an appropriate melolabial transposition flap was drawn incorporating the defect.    The area thus outlined was incised deep to adipose tissue with a #15 scalpel blade.  The skin margins were undermined to an appropriate distance in all directions utilizing iris scissors. Crescentic Advancement Flap Text: The defect edges were debeveled with a #15 scalpel blade.  Given the location of the defect and the proximity to free margins a crescentic advancement flap was deemed most appropriate.  Using a sterile surgical marker, the appropriate advancement flap was drawn incorporating the defect and placing the expected incisions within the relaxed skin tension lines where possible.    The area thus outlined was incised deep to adipose tissue with a #15 scalpel blade.  The skin margins were undermined to an appropriate distance in all directions utilizing iris scissors. Consent (Ear)/Introductory Paragraph: The rationale for Mohs was explained to the patient and consent was obtained. The risks, benefits and alternatives to therapy were discussed in detail. Specifically, the risks of ear deformity, infection, scarring, bleeding, prolonged wound healing, incomplete removal, allergy to anesthesia, nerve injury and recurrence were addressed. Prior to the procedure, the treatment site was clearly identified and confirmed by the patient. All components of Universal Protocol/PAUSE Rule completed. Island Pedicle Flap Text: The defect edges were debeveled with a #15 scalpel blade.  Given the location of the defect, shape of the defect and the proximity to free margins an island pedicle advancement flap was deemed most appropriate.  Using a sterile surgical marker, an appropriate advancement flap was drawn incorporating the defect, outlining the appropriate donor tissue and placing the expected incisions within the relaxed skin tension lines where possible.    The area thus outlined was incised deep to adipose tissue with a #15 scalpel blade.  The skin margins were undermined to an appropriate distance in all directions around the primary defect and laterally outward around the island pedicle utilizing iris scissors.  There was minimal undermining beneath the pedicle flap. Additional Anesthesia Volume In Cc: 0.5 Bilateral Helical Rim Advancement Flap Text: The defect edges were debeveled with a #15 blade scalpel.  Given the location of the defect and the proximity to free margins (helical rim) a bilateral helical rim advancement flap was deemed most appropriate.  Using a sterile surgical marker, the appropriate advancement flaps were drawn incorporating the defect and placing the expected incisions between the helical rim and antihelix where possible.  The area thus outlined was incised through and through with a #15 scalpel blade.  With a skin hook and iris scissors, the flaps were gently and sharply undermined and freed up. Surgeon Performing Repair (Optional): MD Margarita Mastoid Interpolation Flap Text: A decision was made to reconstruct the defect utilizing an interpolation axial flap and a staged reconstruction.  A telfa template was made of the defect.  This telfa template was then used to outline the mastoid interpolation flap.  The donor area for the pedicle flap was then injected with anesthesia.  The flap was excised through the skin and subcutaneous tissue down to the layer of the underlying musculature.  The pedicle flap was carefully excised within this deep plane to maintain its blood supply.  The edges of the donor site were undermined.   The donor site was closed in a primary fashion.  The pedicle was then rotated into position and sutured.  Once the tube was sutured into place, adequate blood supply was confirmed with blanching and refill.  The pedicle was then wrapped with xeroform gauze and dressed appropriately with a telfa and gauze bandage to ensure continued blood supply and protect the attached pedicle. Area M Indication Text: Tumors in this location are included in Area M (cheek, forehead, scalp, neck, jawline and pretibial skin).  Mohs surgery is indicated for tumors in these anatomic locations. Consent 2/Introductory Paragraph: Mohs surgery was explained to the patient and consent was obtained. The risks, benefits and alternatives to therapy were discussed in detail. Specifically, the risks of infection, scarring, bleeding, prolonged wound healing, incomplete removal, allergy to anesthesia, nerve injury and recurrence were addressed. Prior to the procedure, the treatment site was clearly identified and confirmed by the patient. All components of Universal Protocol/PAUSE Rule completed. Xenograft Text: The defect edges were debeveled with a #15 scalpel blade.  Given the location of the defect, shape of the defect and the proximity to free margins a xenograft was deemed most appropriate.  The graft was then trimmed to fit the size of the defect.  The graft was then placed in the primary defect and oriented appropriately. Split-Thickness Skin Graft Text: The defect edges were debeveled with a #15 scalpel blade.  Given the location of the defect, shape of the defect and the proximity to free margins a split thickness skin graft was deemed most appropriate.  Using a sterile surgical marker, the primary defect shape was transferred to the donor site. The split thickness graft was then harvested.  The skin graft was then placed in the primary defect and oriented appropriately. Referred To Otolaryngology For Closure Text (Leave Blank If You Do Not Want): After obtaining clear surgical margins the patient was sent to otolaryngology for surgical repair.  The patient understands they will receive post-surgical care and follow-up from the reconstructive physician's office. Secondary Defect Length In Cm (Required For Flaps): 4 Double Island Pedicle Flap Text: The defect edges were debeveled with a #15 scalpel blade.  Given the location of the defect, shape of the defect and the proximity to free margins a double island pedicle advancement flap was deemed most appropriate.  Using a sterile surgical marker, an appropriate advancement flap was drawn incorporating the defect, outlining the appropriate donor tissue and placing the expected incisions within the relaxed skin tension lines where possible.    The area thus outlined was incised deep to adipose tissue with a #15 scalpel blade.  The skin margins were undermined to an appropriate distance in all directions around the primary defect and laterally outward around the island pedicle utilizing iris scissors.  There was minimal undermining beneath the pedicle flap. Post-Care Instructions: I reviewed with the patient in detail post-care instructions including a written handout and contact instructions if any concerns. Advancement Flap (Double) Text: The defect edges were debeveled with a #15 scalpel blade.  Given the location of the defect and the proximity to free margins a double advancement flap was deemed most appropriate.  Using a sterile surgical marker, the appropriate advancement flaps were drawn incorporating the defect and placing the expected incisions within the relaxed skin tension lines where possible.    The area thus outlined was incised deep to adipose tissue with a #15 scalpel blade.  The skin margins were undermined to an appropriate distance in all directions utilizing iris scissors.

## 2023-04-13 NOTE — PROGRESS NOTES
"Chief Complaint  Leg Pain (\"Both legs, left is worse\")    Subjective          Luisa Padilla presents to [facility] today for acute complaint of bilateral, L>R leg pain.    She does have chronic low back pain but the left leg is now worse.  She has had a couple of falls since she was here last (fell off a ladder a few feet while painting shutters, tripped and fell while laying out rock outside).  She has been doing a lot of work outdoors and around the house.  Severe aching pain.  Pain shoots down the L>R legs, coming from the midline low back.  The leg pain is the worst.  +Numbness and paresthesias on the L leg primarily.  Legs ache worse after she has been sitting for too long.  She has been taking her gabapentin and that does help some.  She has been using some Flexeril 10 mg QHS which helps her get to sleep at night.  She is also still using her meloxicam.      Current Outpatient Medications:   •  atorvastatin (LIPITOR) 40 MG tablet, TAKE 1 TABLET EVERY DAY, Disp: 90 tablet, Rfl: 1  •  carvedilol (COREG) 3.125 MG tablet, TAKE 1 TABLET EVERY NIGHT, Disp: 90 tablet, Rfl: 1  •  Diclofenac Sodium (VOLTAREN) 1 % gel gel, APPLY 4 GM TOPICALLY TO THE APPROPRIATE AREA AS DIRECTED 4 TIMES A DAY AS NEEDED (ARTHRITIS) FOR UP TO 30 DAYS., Disp: 100 g, Rfl: 2  •  gabapentin (NEURONTIN) 100 MG capsule, TAKE 2 TO 3 CAPSULES 3 TIMES DAILY AS NEEDED, Disp: 270 capsule, Rfl: 2  •  guaiFENesin (MUCINEX) 600 MG 12 hr tablet, Take 2 tablets by mouth 2 (Two) Times a Day., Disp: , Rfl:   •  hydrOXYzine (ATARAX) 25 MG tablet, TAKE 1/2 TO 1 TABLET EVERY 6 HOURS DURING THE DAY AND 1 TO 2 TABLETS EVERY NIGHT AS NEEDED FOR ANXIETY, Disp: 180 tablet, Rfl: 1  •  levothyroxine (SYNTHROID, LEVOTHROID) 50 MCG tablet, TAKE 1 TABLET EVERY DAY, Disp: 90 tablet, Rfl: 1  •  meloxicam (MOBIC) 15 MG tablet, TAKE 1 TABLET BY MOUTH DAILY AS NEEDED FOR MODERATE PAIN, Disp: 90 tablet, Rfl: 1  •  omeprazole (priLOSEC) 40 " "MG capsule, TAKE 1 CAPSULE EVERY DAY, Disp: 90 capsule, Rfl: 1  •  ondansetron (ZOFRAN) 4 MG tablet, TAKE 1 TABLET TWICE DAILY AS NEEDED FOR NAUSEA AND VOMITING, Disp: 60 tablet, Rfl: 0  •  cyclobenzaprine (FLEXERIL) 10 MG tablet, Take 1 tablet by mouth At Night As Needed for Muscle Spasms., Disp: 30 tablet, Rfl: 0    Allergies:  Codeine, Hydroxyzine, and Oxycodone-acetaminophen      Objective   Vital Signs:   Vitals:    04/13/23 0806   BP: 118/58   BP Location: Right arm   Patient Position: Sitting   Pulse: 77   Temp: 97.9 °F (36.6 °C)   TempSrc: Oral   Weight: 72.8 kg (160 lb 9.6 oz)   Height: 163.8 cm (64.49\")       Physical Exam  Constitutional:       Appearance: Normal appearance.   HENT:      Head: Normocephalic and atraumatic.   Eyes:      Extraocular Movements: Extraocular movements intact.      Conjunctiva/sclera: Conjunctivae normal.   Pulmonary:      Effort: Pulmonary effort is normal. No respiratory distress.   Musculoskeletal:         General: Tenderness (L paraspinals and upper L buttocks; L popliteal area) present. Normal range of motion.   Skin:     General: Skin is warm and dry.   Neurological:      General: No focal deficit present.      Mental Status: She is alert and oriented to person, place, and time.      Motor: No weakness (L leg, no weakness).      Deep Tendon Reflexes: Reflexes normal.   Psychiatric:         Mood and Affect: Mood normal.         Behavior: Behavior normal.         Thought Content: Thought content normal.         Judgment: Judgment normal.           Lab Results   Component Value Date    GLUCOSE 113 (H) 02/02/2023    BUN 8 02/02/2023    CREATININE 1.10 (H) 02/02/2023    EGFRIFNONA 60 01/12/2022    EGFRIFAFRI 69 01/12/2022    BCR 7.3 02/02/2023    K 4.1 02/02/2023    CO2 26.0 02/02/2023    CALCIUM 9.7 02/02/2023    PROTENTOTREF 6.6 01/12/2022    ALBUMIN 4.2 02/02/2023    LABIL2 2.0 01/12/2022    AST 23 02/02/2023    ALT 20 02/02/2023       Lab Results   Component Value Date    " CHOL 183 02/02/2023    CHLPL 165 01/12/2022    TRIG 140 02/02/2023    HDL 55 02/02/2023     (H) 02/02/2023            Assessment and Plan    Diagnoses and all orders for this visit:    1. Chronic bilateral low back pain with bilateral sciatica (Primary)  Assessment & Plan:  Already on meloxicam and gabapentin at baseline.  She has been using some Flexeril from her sister.  That has helped.  I will get her a prescription for that of her bone.  I also think she would benefit from some physical therapy.  It sounds like she has been working out in the yard and in her home more often than usual lately and probably has aggravated her chronic low back pain.  Referral placed to Physical Therapy as below.  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  Orthopedics is already aware.  I have encouraged her to get back in touch with them to see what next steps they recommend.    Orders:  -     cyclobenzaprine (FLEXERIL) 10 MG tablet; Take 1 tablet by mouth At Night As Needed for Muscle Spasms.  Dispense: 30 tablet; Refill: 0  -     Ambulatory Referral to Physical Therapy Evaluate and treat    2. Major depressive disorder, recurrent episode, moderate degree  Assessment & Plan:  We have been weaning her down on her anxiety meds and antidepressants as she does not feel she needs these any longer.  She is completely off the buspirone at this time.  She has gotten herself down to paroxetine 30 mg tablet 1/2 tablet/day.  Wishes to discontinue that at this time which she may go ahead and do.  Call me if she has any problems.      3. Other specified anxiety disorders  Assessment & Plan:  As per depression plan.        Follow Up   Return if symptoms worsen or fail to improve, for or as scheduled 5/10/2023.  Patient was given instructions and counseling regarding her condition or for health maintenance advice. Please see specific information pulled into the AVS if  appropriate.           04/13/2023

## 2023-04-17 ENCOUNTER — TELEPHONE (OUTPATIENT)
Dept: FAMILY MEDICINE CLINIC | Age: 73
End: 2023-04-17

## 2023-04-17 NOTE — TELEPHONE ENCOUNTER
Caller: Luisa Padilla    Relationship: Self    Best call back number: [phone]    Who are you requesting to speak with (clinical staff, provider,  specific staff member): MEDICAL STAFF    What was the call regarding: PATIENT IS NOT ABLE TO BE SEEN BY PHYSICAL THERAPY UNTIL MAY 15TH. SHE WOULD LIKE TO KNOW IF AN XRAY CAN BE ORDERED FOR HER LOWER BACK. SHE BELIEVES SHE HAS A RUPTURED DISK.

## 2023-04-18 NOTE — TELEPHONE ENCOUNTER
Spoke to pt and she states the flexeril is helping some.  She is taking Gabapentin 100mg 3 cap at bed time.  I instructed her that her rx states she can take 2-3 caps TID prn.  I asked her if she was just taking them at night because it made her to sleepy during the day and she stated no she has just always  just taken them at night.  I suggested her to take 2 cap today and the 3 at night and see if that helps her pain and to let us know if it did not.

## 2023-04-18 NOTE — TELEPHONE ENCOUNTER
Unfortunately, an XR is not going to give us an information about a ruptured disc.  We would have to see that on an MRI, but insurance will not approve an MRI until after she has done PT.  I know it feels like a catch-22.  Has the Flexeril helped at all?  How much of the gabapentin is she taking?  We may be able to increase that to get her some relief in the meantime.  I know it is probably hurting like crazy.  Thanks, BZM

## 2023-04-20 ENCOUNTER — OFFICE VISIT (OUTPATIENT)
Dept: PULMONOLOGY | Facility: CLINIC | Age: 73
End: 2023-04-20
Payer: MEDICARE

## 2023-04-20 VITALS
OXYGEN SATURATION: 97 % | HEIGHT: 64 IN | DIASTOLIC BLOOD PRESSURE: 76 MMHG | WEIGHT: 157 LBS | HEART RATE: 74 BPM | RESPIRATION RATE: 18 BRPM | BODY MASS INDEX: 26.8 KG/M2 | SYSTOLIC BLOOD PRESSURE: 118 MMHG | TEMPERATURE: 96 F

## 2023-04-20 DIAGNOSIS — U09.9 LONG COVID: ICD-10-CM

## 2023-04-20 DIAGNOSIS — R91.8 LUNG NODULES: Primary | ICD-10-CM

## 2023-04-20 DIAGNOSIS — R05.3 CHRONIC COUGH: ICD-10-CM

## 2023-04-20 NOTE — PROGRESS NOTES
Pulmonary Consultation    Kari Harris*,    Thank you for asking me to see Luisa Padilla for   Chief Complaint   Patient presents with   • Cough   .      History of Present Illness  Luisa Padilla is a 72 y.o. female with a PMH significant for COVID-pneumonia and obstructive sleep apnea presents for evaluation of chronic cough after her COVID infection her cough seems to have improved recently and she denies any shortness of breath chest pain weight loss night sweats or fever patient was noted to have abnormal CT scan with multiple lung nodules hence the referral       Tobacco use history:  Former smoker      Review of Systems: History obtained from chart review and the patient.  Review of Systems   Respiratory: Positive for cough.    All other systems reviewed and are negative.    As described in the HPI. Otherwise, remainder of ROS (14 systems) were negative.    Patient Active Problem List   Diagnosis   • Right shoulder pain   • Greater trochanteric bursitis of right hip   • Primary osteoarthritis of right hip   • Other specified anxiety disorders   • Chronic cough   • Gastroesophageal reflux disease without esophagitis   • Essential hypertension   • Mixed hyperlipidemia   • Acquired hypothyroidism   • Irritable bowel syndrome with both constipation and diarrhea   • Obstructive sleep apnea   • Chronic bilateral low back pain with bilateral sciatica   • Major depressive disorder, recurrent episode, moderate degree   • Osteopenia of multiple sites   • High risk medication use   • Primary insomnia   • Atrophic vaginitis   • Primary osteoarthritis of both knees   • Primary osteoarthritis of hands, bilateral   • Onychomycosis   • Breast pain, left   • COVID-19 virus infection         Current Outpatient Medications:   •  atorvastatin (LIPITOR) 40 MG tablet, TAKE 1 TABLET EVERY DAY, Disp: 90 tablet, Rfl: 1  •  carvedilol (COREG) 3.125 MG tablet, TAKE 1 TABLET EVERY NIGHT, Disp: 90 tablet, Rfl: 1  •   cyclobenzaprine (FLEXERIL) 10 MG tablet, Take 1 tablet by mouth At Night As Needed for Muscle Spasms., Disp: 30 tablet, Rfl: 0  •  Diclofenac Sodium (VOLTAREN) 1 % gel gel, APPLY 4 GM TOPICALLY TO THE APPROPRIATE AREA AS DIRECTED 4 TIMES A DAY AS NEEDED (ARTHRITIS) FOR UP TO 30 DAYS., Disp: 100 g, Rfl: 2  •  gabapentin (NEURONTIN) 100 MG capsule, TAKE 2 TO 3 CAPSULES 3 TIMES DAILY AS NEEDED, Disp: 270 capsule, Rfl: 2  •  guaiFENesin (MUCINEX) 600 MG 12 hr tablet, Take 2 tablets by mouth 2 (Two) Times a Day., Disp: , Rfl:   •  hydrOXYzine (ATARAX) 25 MG tablet, TAKE 1/2 TO 1 TABLET EVERY 6 HOURS DURING THE DAY AND 1 TO 2 TABLETS EVERY NIGHT AS NEEDED FOR ANXIETY, Disp: 180 tablet, Rfl: 1  •  levothyroxine (SYNTHROID, LEVOTHROID) 50 MCG tablet, TAKE 1 TABLET EVERY DAY, Disp: 90 tablet, Rfl: 1  •  meloxicam (MOBIC) 15 MG tablet, TAKE 1 TABLET BY MOUTH DAILY AS NEEDED FOR MODERATE PAIN, Disp: 90 tablet, Rfl: 1  •  omeprazole (priLOSEC) 40 MG capsule, TAKE 1 CAPSULE EVERY DAY, Disp: 90 capsule, Rfl: 1  •  ondansetron (ZOFRAN) 4 MG tablet, TAKE 1 TABLET TWICE DAILY AS NEEDED FOR NAUSEA AND VOMITING, Disp: 60 tablet, Rfl: 0    Allergies   Allergen Reactions   • Codeine Nausea And Vomiting   • Hydroxyzine Other (See Comments)     Blurred vision   • Oxycodone-Acetaminophen Nausea Only       Past Medical History:   Diagnosis Date   • Abnormal findings on diagnostic imaging of other parts of digestive tract    • Acute vaginitis    • Allergy     CODEINE SULFATE,OXYCODONE/ACETAMINOP,HYDROXYZINE HCL   MODERATE   • Anxiety and depression    • Anxiety disorder, unspecified    • AR (allergic rhinitis)    • Chronic pain    • Depression    • Essential (primary) hypertension    • GERD (gastroesophageal reflux disease)    • History of hemoptysis     APPROX 30 YRS AGO   • Hyperlipidemia    • Hypothyroidism    • IBS (irritable bowel syndrome)     WITHOUT DIARRHEA   • Insomnia    • Low back pain    • Obstructive sleep apnea (adult)  (pediatric)    • Osteoarthritis    • Osteopenia    • Other disturbances of smell and taste    • Other long term (current) drug therapy    • Other somatoform disorders    • Pain in left shoulder    • Pain in throat    • PONV (postoperative nausea and vomiting)    • Primary insomnia    • Primary osteoarthritis, left hand    • Primary osteoarthritis, right hand    • Right hip pain    • Sciatica, right side    • Sleep apnea     DOES NOT WEAR MACHINE   • Unilateral primary osteoarthritis, right hip    • Unspecified disorder of nose and nasal sinuses      Past Surgical History:   Procedure Laterality Date   • BREAST AUGMENTATION Bilateral    • CHOLECYSTECTOMY     • COLONOSCOPY     • ENDOSCOPY     • HYSTERECTOMY     • JOINT REPLACEMENT Right 2020    BALJINDER   • LUMBAR DISC SURGERY     • LUMBAR DISCECTOMY      L5-S1   • TOTAL HIP ARTHROPLASTY Right 2020    Procedure: RIGHT TOTAL HIP ARTHROPLASTY;  Surgeon: Iker Cisneros MD;  Location: [facility]  Service: Orthopedics;  Laterality: Right;     Social History     Socioeconomic History   • Marital status:      Spouse name: EM   • Number of children: 2   Tobacco Use   • Smoking status: Former     Packs/day: 3.00     Years: 30.00     Pack years: 90.00     Types: Cigarettes     Quit date:      Years since quittin.3   • Smokeless tobacco: Never   Vaping Use   • Vaping Use: Never used   Substance and Sexual Activity   • Alcohol use: Yes     Comment: socially   • Drug use: Never   • Sexual activity: Not Currently     Partners: Male     Birth control/protection: Surgical     Comment: hysterectomy     Family History   Problem Relation Age of Onset   • Arthritis Mother    • Breast cancer Mother    • COPD Sister    • Arthritis Maternal Grandmother    • Cancer Other         Breast   • Malig Hyperthermia Neg Hx        CT Chest With Contrast Diagnostic    Result Date: 2023    1. Multiple bilateral ground-glass nodules, at least 1 of them is part solid  "in the right lower lobe.  Recommend PET scan for further evaluation, as this lesion measures 2.2 cm in diameter.     YOVANI PADILLA MD       Electronically Signed and Approved By: YOVANI PADILLA MD on 2/16/2023 at 14:46             NM PET/CT Skull Base to Mid Thigh    Result Date: 3/9/2023    1. 2.2 cm partially solid right lower lobe nodule is not FDG avid, arguing against malignancy, although a low-grade neoplasm is possible.  Recommend follow-up CT chest in 3 months. 2. No evidence of metastasis on this examination.     BG ALMARAZ MD       Electronically Signed and Approved By: BG ALMARAZ MD on 3/09/2023 at 15:53             XR Chest PA & Lateral    Result Date: 1/20/2023    No acute infiltrate is appreciated.    Please note that portions of this note were completed with a voice recognition program.  LUCIA WHITTAKER JR, MD       Electronically Signed and Approved By: LUCIA WHITTAKER JR, MD on 1/20/2023 at 5:14                    Objective     Blood pressure 118/76, pulse 74, temperature 96 °F (35.6 °C), temperature source Temporal, resp. rate 18, height 163.8 cm (64.49\"), weight 71.2 kg (157 lb), SpO2 97 %, not currently breastfeeding.  Physical Exam  Vitals and nursing note reviewed.   Constitutional:       Appearance: Normal appearance.   HENT:      Head: Normocephalic and atraumatic.      Nose: Nose normal.      Mouth/Throat:      Mouth: Mucous membranes are moist.      Pharynx: Oropharynx is clear.   Eyes:      Extraocular Movements: Extraocular movements intact.      Conjunctiva/sclera: Conjunctivae normal.      Pupils: Pupils are equal, round, and reactive to light.   Cardiovascular:      Rate and Rhythm: Normal rate and regular rhythm.      Pulses: Normal pulses.      Heart sounds: Normal heart sounds.   Pulmonary:      Effort: Pulmonary effort is normal.      Breath sounds: Normal breath sounds.   Abdominal:      General: Abdomen is flat. Bowel sounds are normal.      Palpations: Abdomen is soft. "   Musculoskeletal:         General: Normal range of motion.      Cervical back: Normal range of motion and neck supple.   Skin:     General: Skin is warm.      Capillary Refill: Capillary refill takes 2 to 3 seconds.   Neurological:      General: No focal deficit present.      Mental Status: She is alert and oriented to person, place, and time.   Psychiatric:         Mood and Affect: Mood normal.         Behavior: Behavior normal.       Immunization History   Administered Date(s) Administered   • COVID-19 (PFIZER) BIVALENT BOOSTER 12+YRS 02/07/2023   • COVID-19 (PFIZER) PURPLE CAP 03/22/2021, 03/22/2021, 04/12/2021, 04/12/2021   • COVID-19 (UNSPECIFIED) 03/22/2021, 04/12/2021   • Influenza, Unspecified 09/09/2017   • Pneumococcal Conjugate 20-Valent (PCV20) 09/29/2022            Assessment & Plan     Diagnoses and all orders for this visit:    1. Lung nodules (Primary)  -     CT Chest Low Dose Follow Up With Contrast; Future    2. Chronic cough  -     CT Chest Low Dose Follow Up With Contrast; Future    3. Long COVID         Discussion/ Recommendations:   CT scan and PET scan reviewed patient has multiple lung nodules the largest is in the right lower lobe about 2.2 cm which does not seem to be metabolically active on the PET scan  We will order CT chest in about 3 months time to check for stability  Patient is advised to continue regular exercise and home medications  Discussed vaccination and recommended    BMI is >= 25 and <30. (Overweight) The following options were offered after discussion;: exercise counseling/recommendations           Return in about 3 months (around 7/20/2023).      Thank you for allowing me to participate in the care of Luisa Padilla. Please do not hesitate to contact me with any questions.         This document has been electronically signed by Per Navarrete MD on April 20, 2023 14:22 EDT

## 2023-04-25 ENCOUNTER — TELEPHONE (OUTPATIENT)
Dept: FAMILY MEDICINE CLINIC | Age: 73
End: 2023-04-25

## 2023-04-25 NOTE — TELEPHONE ENCOUNTER
Caller: Luisa Padilla    Relationship: Self    Best call back number: [phone]    What is the medical concern/diagnosis: N/A    What specialty or service is being requested: PHYSICAL THERAPY, NEUROSURGEON    What is the provider, practice or medical service name: N/A    What is the office location:N/A    What is the office phone number: N/A    Any additional details:PATIENT FEELS SHE HAS RUPTURED A DISC. SHE WOULD LIKE TO SCHEDULE WITH NEUROSURGEON INSTEAD OF PT. PATIENT'S LAST NEUROSURGEON HAS RETIRED. PLEASE CALL PATIENT BACK AND SCHEDULE/ADVISE.

## 2023-04-25 NOTE — TELEPHONE ENCOUNTER
Please let Luisa know that I am so sorry, but Neurosurgery will not schedule her without an MRI of the back first being done, and insurance will not approve an MRI being done until she has completed at least 4 weeks of Physical Therapy.  I see that her appointment for PT is still not until 5/15/2023.  Would she like us to try scheduling her with [facility] in [place] to try to get her in sooner?  Thanks, BZALLYSON

## 2023-04-26 NOTE — TELEPHONE ENCOUNTER
Pt informed and would like to be seen somewhere else sooner.  Referrals please try to get her in sooner somewhere else.

## 2023-04-26 NOTE — TELEPHONE ENCOUNTER
Caller: Luisa Padilla    Relationship to patient: Self    Best call back number: [phone]    Patient is needing: PATIENT CALLED IN AND WANTED TO LET NURSE MARSHALL KNOW THAT SHE IS GOING TO KEEP HER APPOINTMENT WITH PT ON 5/15/2022

## 2023-05-10 ENCOUNTER — OFFICE VISIT (OUTPATIENT)
Dept: FAMILY MEDICINE CLINIC | Age: 73
End: 2023-05-10
Payer: MEDICARE

## 2023-05-10 VITALS
HEART RATE: 80 BPM | WEIGHT: 155 LBS | SYSTOLIC BLOOD PRESSURE: 113 MMHG | DIASTOLIC BLOOD PRESSURE: 59 MMHG | HEIGHT: 64 IN | BODY MASS INDEX: 26.46 KG/M2 | OXYGEN SATURATION: 98 %

## 2023-05-10 DIAGNOSIS — K21.9 GASTROESOPHAGEAL REFLUX DISEASE WITHOUT ESOPHAGITIS: ICD-10-CM

## 2023-05-10 DIAGNOSIS — Z79.899 HIGH RISK MEDICATION USE: ICD-10-CM

## 2023-05-10 DIAGNOSIS — I10 ESSENTIAL HYPERTENSION: ICD-10-CM

## 2023-05-10 DIAGNOSIS — G89.29 CHRONIC BILATERAL LOW BACK PAIN WITH BILATERAL SCIATICA: Primary | ICD-10-CM

## 2023-05-10 DIAGNOSIS — M54.42 CHRONIC BILATERAL LOW BACK PAIN WITH BILATERAL SCIATICA: Primary | ICD-10-CM

## 2023-05-10 DIAGNOSIS — E78.2 MIXED HYPERLIPIDEMIA: ICD-10-CM

## 2023-05-10 DIAGNOSIS — M54.41 CHRONIC BILATERAL LOW BACK PAIN WITH BILATERAL SCIATICA: Primary | ICD-10-CM

## 2023-05-10 PROBLEM — U07.1 COVID-19 VIRUS INFECTION: Status: RESOLVED | Noted: 2023-02-21 | Resolved: 2023-05-10

## 2023-05-10 LAB
AMPHET+METHAMPHET UR QL: NEGATIVE
AMPHETAMINES UR QL: NEGATIVE
BARBITURATES UR QL SCN: NEGATIVE
BENZODIAZ UR QL SCN: NEGATIVE
BUPRENORPHINE SERPL-MCNC: NEGATIVE NG/ML
CANNABINOIDS SERPL QL: NEGATIVE
COCAINE UR QL: NEGATIVE
EXPIRATION DATE: NORMAL
Lab: NORMAL
MDMA UR QL SCN: NEGATIVE
METHADONE UR QL SCN: NEGATIVE
OPIATES UR QL: NEGATIVE
OXYCODONE UR QL SCN: NEGATIVE
PCP UR QL SCN: NEGATIVE

## 2023-05-10 PROCEDURE — 99214 OFFICE O/P EST MOD 30 MIN: CPT | Performed by: FAMILY MEDICINE

## 2023-05-10 PROCEDURE — 1160F RVW MEDS BY RX/DR IN RCRD: CPT | Performed by: FAMILY MEDICINE

## 2023-05-10 PROCEDURE — 3078F DIAST BP <80 MM HG: CPT | Performed by: FAMILY MEDICINE

## 2023-05-10 PROCEDURE — 1159F MED LIST DOCD IN RCRD: CPT | Performed by: FAMILY MEDICINE

## 2023-05-10 PROCEDURE — 3074F SYST BP LT 130 MM HG: CPT | Performed by: FAMILY MEDICINE

## 2023-05-10 PROCEDURE — 80305 DRUG TEST PRSMV DIR OPT OBS: CPT | Performed by: FAMILY MEDICINE

## 2023-05-10 RX ORDER — FAMOTIDINE 40 MG/1
40 TABLET, FILM COATED ORAL DAILY
Qty: 90 TABLET | Refills: 0 | Status: SHIPPED | OUTPATIENT
Start: 2023-05-10

## 2023-05-10 NOTE — ASSESSMENT & PLAN NOTE
Blood pressure running at goal.  Continue carvedilol 3.125 mg twice daily.  No refills needed.  Continue to monitor.  Labs reviewed and up-to-date.

## 2023-05-10 NOTE — PROGRESS NOTES
Chief Complaint  Depression (3 month follow up)    Subjective          Luisa Padilla presents to [facility] today for f/u of routine problems.    She has been noticing some increased loss of short term memory.    She has been having a flare of her chronic low back pain with bilateral sciatica, left greater than right.  She is worried that she has a herniated disc, so we are working through the process to get her an MRI for further evaluation.  She has her first appointment scheduled with Physical Therapy on 5/15/2023 upstairs.  We had increased her gabapentin to 300 mg TID but that was overly sedating for her, so she is back to just taking it at bedtime.  It did relieve her pain, but unfortunately, the side effects were not worth it.      Cough related to long COVID is improved.  She did see Dr. Landon Tucker who reviewed her CT chest showing multiple lung nodules.  They are planning a repeat in 3 months to monitor.    She is back on meloxicam for use osteoarthritis pain.  She does use gabapentin for chronic low back pain with RLE sciatica as well.  Tox screen is due, last done 6/2022 and this was appropriate.  Has used diclofenac and methocarbamol previously.  She is following with Dr. Smith at [facility] and has had Synvisc injections in the knees.      We stopped her paroxetine at last visit as she felt she no longer needed it.  She has been doing great.     She is on carvedilol for  hypertension.  Blood pressure has been well controlled.  She denies chest pain, shortness of breath, or palpitations.     She is on levothyroxine for hypothyroidism.  She denies cold/heat intolerance or changes in hair or skin.        She is on atorvastation for hyperlipidemia.  She denies myalgias.        She is on omeprazole for GERD.  Her reflux has been acting up lately.  She has been noticing more nausea as well as indigestion and heartburn.  She has prn Zofran in case nausea  "flares up.    Follows with Dr. Boo LAWSON.      Current Outpatient Medications:   •  atorvastatin (LIPITOR) 40 MG tablet, TAKE 1 TABLET EVERY DAY, Disp: 90 tablet, Rfl: 1  •  carvedilol (COREG) 3.125 MG tablet, TAKE 1 TABLET EVERY NIGHT, Disp: 90 tablet, Rfl: 1  •  cyclobenzaprine (FLEXERIL) 10 MG tablet, Take 1 tablet by mouth At Night As Needed for Muscle Spasms., Disp: 30 tablet, Rfl: 0  •  Diclofenac Sodium (VOLTAREN) 1 % gel gel, APPLY 4 GM TOPICALLY TO THE APPROPRIATE AREA AS DIRECTED 4 TIMES A DAY AS NEEDED (ARTHRITIS) FOR UP TO 30 DAYS., Disp: 100 g, Rfl: 2  •  gabapentin (NEURONTIN) 100 MG capsule, TAKE 2 TO 3 CAPSULES 3 TIMES DAILY AS NEEDED, Disp: 270 capsule, Rfl: 2  •  guaiFENesin (MUCINEX) 600 MG 12 hr tablet, Take 2 tablets by mouth 2 (Two) Times a Day., Disp: , Rfl:   •  levothyroxine (SYNTHROID, LEVOTHROID) 50 MCG tablet, TAKE 1 TABLET EVERY DAY, Disp: 90 tablet, Rfl: 1  •  meloxicam (MOBIC) 15 MG tablet, TAKE 1 TABLET BY MOUTH DAILY AS NEEDED FOR MODERATE PAIN, Disp: 90 tablet, Rfl: 1  •  omeprazole (priLOSEC) 40 MG capsule, TAKE 1 CAPSULE EVERY DAY, Disp: 90 capsule, Rfl: 1  •  ondansetron (ZOFRAN) 4 MG tablet, TAKE 1 TABLET TWICE DAILY AS NEEDED FOR NAUSEA AND VOMITING, Disp: 60 tablet, Rfl: 0  •  famotidine (PEPCID) 40 MG tablet, Take 1 tablet by mouth Daily., Disp: 90 tablet, Rfl: 0  •  hydrOXYzine (ATARAX) 25 MG tablet, TAKE 1/2 TO 1 TABLET EVERY 6 HOURS DURING THE DAY AND 1 TO 2 TABLETS EVERY NIGHT AS NEEDED FOR ANXIETY, Disp: 180 tablet, Rfl: 1    Allergies:  Codeine, Hydroxyzine, and Oxycodone-acetaminophen      Objective   Vital Signs:   Vitals:    05/10/23 0903   BP: 113/59   BP Location: Left arm   Patient Position: Sitting   Cuff Size: Adult   Pulse: 80   SpO2: 98%   Weight: 70.3 kg (155 lb)   Height: 163.8 cm (64.49\")       Physical Exam  Vitals reviewed.   Constitutional:       General: She is not in acute distress.     Appearance: Normal appearance. She is well-developed.   HENT:      " Head: Normocephalic and atraumatic.      Right Ear: External ear normal.      Left Ear: External ear normal.   Eyes:      Extraocular Movements: Extraocular movements intact.      Conjunctiva/sclera: Conjunctivae normal.      Pupils: Pupils are equal, round, and reactive to light.   Cardiovascular:      Rate and Rhythm: Normal rate and regular rhythm.      Heart sounds: No murmur heard.  Pulmonary:      Effort: Pulmonary effort is normal.      Breath sounds: Normal breath sounds. No wheezing, rhonchi or rales.   Abdominal:      General: Bowel sounds are normal. There is no distension.      Palpations: Abdomen is soft.      Tenderness: There is no abdominal tenderness.   Musculoskeletal:         General: Normal range of motion.   Neurological:      Mental Status: She is alert.   Psychiatric:         Mood and Affect: Affect normal.           Lab Results   Component Value Date    GLUCOSE 113 (H) 02/02/2023    BUN 8 02/02/2023    CREATININE 1.10 (H) 02/02/2023    EGFRIFNONA 60 01/12/2022    EGFRIFAFRI 69 01/12/2022    BCR 7.3 02/02/2023    K 4.1 02/02/2023    CO2 26.0 02/02/2023    CALCIUM 9.7 02/02/2023    PROTENTOTREF 6.6 01/12/2022    ALBUMIN 4.2 02/02/2023    LABIL2 2.0 01/12/2022    AST 23 02/02/2023    ALT 20 02/02/2023       Lab Results   Component Value Date    CHOL 183 02/02/2023    CHLPL 165 01/12/2022    TRIG 140 02/02/2023    HDL 55 02/02/2023     (H) 02/02/2023            Assessment and Plan    Diagnoses and all orders for this visit:    1. Chronic bilateral low back pain with bilateral sciatica (Primary)  Assessment & Plan:  She is going to see the Physical Therapy next week, and if that is not effective, we will plan to get her an MRI L-spine without contrast so that she can get in with Neurosurgery versus Pain management as indicated.  In the meantime, she was experiencing excessive sedation from dosing the gabapentin 3 times daily, so she is back to just taking 300 mg at bedtime.  She also  wonders if the gabapentin may be affecting her memory somewhat, so we will keep a close watch on that.  No other adverse effects. She does require ongoing use of this controlled substance to function.  Tox screen was due today.  Prior tox screen appropriate.  JOVANA was run today.  Refills were not needed today.  RTC 3 months.        2. High risk medication use  -     POC Urine Drug Screen [facility]    3. Gastroesophageal reflux disease without esophagitis  Assessment & Plan:  Reflux has been significantly worse lately.  I am going to add in famotidine 40 mg daily to the omeprazole 40 mg nightly she is already taking, and I have encouraged her to get back in with her gastroenterologist.  She also wonders if the GERD could be impacting her cough, although generally speaking, that is better now.    Orders:  -     famotidine (PEPCID) 40 MG tablet; Take 1 tablet by mouth Daily.  Dispense: 90 tablet; Refill: 0    4. Essential hypertension  Assessment & Plan:  Blood pressure running at goal.  Continue carvedilol 3.125 mg twice daily.  No refills needed.  Continue to monitor.  Labs reviewed and up-to-date.      5. Mixed hyperlipidemia  Assessment & Plan:  Stable on atorvastatin 40 mg daily.  No refills needed.  Labs reviewed and up-to-date.        Follow Up   Return in about 3 months (around 8/10/2023) for Recheck.  Patient was given instructions and counseling regarding her condition or for health maintenance advice. Please see specific information pulled into the AVS if appropriate.           05/10/2023

## 2023-05-10 NOTE — ASSESSMENT & PLAN NOTE
Reflux has been significantly worse lately.  I am going to add in famotidine 40 mg daily to the omeprazole 40 mg nightly she is already taking, and I have encouraged her to get back in with her gastroenterologist.  She also wonders if the GERD could be impacting her cough, although generally speaking, that is better now.

## 2023-05-10 NOTE — ASSESSMENT & PLAN NOTE
She is going to see the Physical Therapy next week, and if that is not effective, we will plan to get her an MRI L-spine without contrast so that she can get in with Neurosurgery versus Pain management as indicated.  In the meantime, she was experiencing excessive sedation from dosing the gabapentin 3 times daily, so she is back to just taking 300 mg at bedtime.  She also wonders if the gabapentin may be affecting her memory somewhat, so we will keep a close watch on that.  No other adverse effects. She does require ongoing use of this controlled substance to function.  Tox screen was due today.  Prior tox screen appropriate.  JOVANA was run today.  Refills were not needed today.  RTC 3 months.

## 2023-05-15 ENCOUNTER — TREATMENT (OUTPATIENT)
Dept: PHYSICAL THERAPY | Facility: CLINIC | Age: 73
End: 2023-05-15
Payer: MEDICARE

## 2023-05-15 DIAGNOSIS — R29.898 DECREASED STRENGTH OF TRUNK AND BACK: ICD-10-CM

## 2023-05-15 DIAGNOSIS — R29.898 WEAKNESS OF BOTH LEGS: ICD-10-CM

## 2023-05-15 DIAGNOSIS — M54.41 CHRONIC BILATERAL LOW BACK PAIN WITH BILATERAL SCIATICA: Primary | ICD-10-CM

## 2023-05-15 DIAGNOSIS — G89.29 CHRONIC BILATERAL LOW BACK PAIN WITH BILATERAL SCIATICA: Primary | ICD-10-CM

## 2023-05-15 DIAGNOSIS — M54.42 CHRONIC BILATERAL LOW BACK PAIN WITH BILATERAL SCIATICA: Primary | ICD-10-CM

## 2023-05-15 PROCEDURE — 97161 PT EVAL LOW COMPLEX 20 MIN: CPT | Performed by: PHYSICAL THERAPIST

## 2023-05-15 NOTE — PROGRESS NOTES
Physical Therapy Initial Evaluation and Plan of Care      Patient: Luisa Padilla   : 1950  Diagnosis/ICD-10 Code:  Chronic bilateral low back pain with bilateral sciatica [M54.42, M54.41, G89.29]  Referring practitioner: Kari Harris,*  Date of Initial Visit: 5/15/2023  Today's Date: 5/15/2023  Patient seen for 1 sessions         Visit Diagnoses:    ICD-10-CM ICD-9-CM   1. Chronic bilateral low back pain with bilateral sciatica  M54.42 724.2    M54.41 724.3    G89.29 338.29   2. Decreased strength of trunk and back  R29.898 729.89   3. Weakness of both legs  R29.898 729.89       Subjective Evaluation    History of Present Illness  Mechanism of injury: Luisa comes to OPPT today with complaints of B back and B sciatica, L >R.  She saw PCP last week, referred for OPPT.  If this is not effective, she mentioned that the PCP may refer her for for MRI or see neuro, if needed.     She has trouble standing up from sitting. Once she is walking, she does better.  She has pain in the low back, B hips, and B knees.  She feels like a truck ran over her legs - feet so achiness/stiffness.     The LLE goes numb from the back to the toes.  It has been on/off.  The pain hurts more than the numbness.     She has previously had 3 herniated disc (1 has ruptured), she has surgery and fusion in the lumbar.  Pt thinks another disc has ruptured.     PMH:  Prevoius R THR, lumbar fusion.     Pain  Current pain ratin  Quality: dull ache  Relieving factors: medications and heat  Aggravating factors: stairs, ambulation, standing and sleeping    Social Support  Lives in: one-story house  Lives with: spouse    Hand dominance: right    Treatments  Previous treatment: chiropractic, physical therapy and injection treatment  Patient Goals  Patient goals for therapy: increased motion, improved balance, decreased pain, increased strength, independence with ADLs/IADLs and return to sport/leisure activities             Objective           Active Range of Motion     Additional Active Range of Motion Details  LUMBAR  Flexion: to knees, B lumbar pain  Extension: to neutral, B leg achiness, B lumbar pain  R lateral flexion:  to 30 degrees, R/L lumbar pain  L lateral flexion:  to 35 degrees, B pain  R rotation:  to 45 deg, no pain  L rotation:  to 45 deg, no pain    Radicular symptoms: down the LLE from the hip to the toes    Tenderness: B PSIS, R/L lumbar, QL, B hips, B knees, L hamstring, piriformis    Posture: seated and positioned to the R hip, decrease LLE WB    Strength/Myotome Testing     Left Hip   Planes of Motion   Flexion: 3  Abduction: 3+  Adduction: 3+    Right Hip   Planes of Motion   Flexion: 4-  Abduction: 4-  Adduction: 4    Left Knee   Flexion: 3+  Extension: 3+    Right Knee   Flexion: 4  Extension: 4    Tests       Thoracic   Positive slump.     Lumbar     Left   Positive crossed SLR and passive SLR.           Assessment & Plan     Assessment  Impairments: abnormal or restricted ROM, impaired physical strength, lacks appropriate home exercise program and pain with function    Assessment details: Pt presents with limitations, noted below, that impede her ability to stand periods of time, stair navigation lifting light weights, walking long distances, sleeping, and social activities. The skills of a therapist will be required to safely and effectively implement the following treatment plan to restore maximal level of function.        Goals  Plan Goals: LOW BACK PROBLEMS:    1. The patient complains of low back pain.  LTG 1: 12 weeks:  The patient will report a pain rating of 2/10 or better in order to improve tolerance to activities of daily living and improve sleep quality.  STATUS:  New  STG 1a: 4 weeks:  The patient will report a pain rating of 4/10 or better.  STATUS:  New  TREATMENT:  Therapeutic exercises, manual therapy, aquatic therapy, home exercise instruction, and modalities as needed for pain to include:  electrical  stimulation, moist heat, ice, ultrasound, and diathermy.      2. The patient demonstrates weakness of the bilateral hip.  LTG 2: 12 weeks:  The patient will demonstrate 4+ /5 strength for bilateral hip flexion, abduction, and extension in order to improve hip stability.  STATUS:  New  STG 2a: 4 weeks:  The patient will demonstrate 4 /5 strength for bilateral hip flexion, abduction, and extension.  STATUS:  New  STG2b:  4 weeks:  The patient will be independent with home exercises.     STATUS:  New  TREATMENT: Therapeutic exercises, manual therapy, aquatic therapy, home exercise instruction, and modalities as needed for pain to include:  electrical stimulation, moist heat, ice, ultrasound, and diathermy.      3. The patient has gait dysfunction.  LTG 3: 12 weeks:  The patient will ambulate without assistive device, independently, for community distances with minimal limp to the left lower extremity in order to improve mobility and allow patient to perform activities such as grocery shopping with greater ease.  STATUS:  New  TREATMENT: Gait training, aquatic therapy, therapeutic exercise, and home exercise instruction.      4. Mobility: Walking/Moving Around Functional Limitation    LTG 4: 12 weeks:  The patient will demonstrate limitation by achieving a score of 11/45 on the CLAUDIO.  STATUS:  New  STG 4 a: 4 weeks:  The patient will demonstrate limitation by achieving a score of 15/45 on the CLAUDIO.    STATUS:  New  TREATMENT:  Manual therapy, therapeutic exercise, home exercise instruction, and modalities as needed.      Plan  Therapy options: will be seen for skilled therapy services  Planned modality interventions: cryotherapy, thermotherapy (hydrocollator packs), dry needling, TENS and ultrasound  Other planned modality interventions: NO TRACTION - fusion  Planned therapy interventions: abdominal trunk stabilization, manual therapy, flexibility, functional ROM exercises, gait training, home exercise program,  therapeutic activities, stretching, strengthening, spinal/joint mobilization, soft tissue mobilization, postural training, neuromuscular re-education, body mechanics training, ADL retraining, balance/weight-bearing training and motor coordination training  Frequency: 1x week  Duration in weeks: 12  Treatment plan discussed with: patient  Plan details: Create an HEP, update as needed.    Progress with lower back/core strength, trunk control/postural awareness, increased stamina, decreased tightness, improved ROM/flexibility, education as needed.    If no progress with PT, reach back to PCP and possible referral for MRI, neuro          History # of Personal Factors and/or Comorbidities: HIGH (3+)  Examination of Body System(s): # of elements: MODERATE (3)  Clinical Presentation: STABLE   Clinical Decision Making: LOW     Timed:  Manual Therapy:         mins  28548;  Therapeutic Exercise:   6      mins  67415;     Neuromuscular Felipe:        mins  15372;    Therapeutic Activity:          mins  98945;     Gait Training:          mins  46802;     Ultrasound:          mins  10965;    Canalith Repos           ___  mins  09681      Untimed:  Electrical Stimulation:         mins  80586 ( );  Mechanical Traction:         mins  04720;   Dry Needling:                     mins self pay  Low Eval:                      31     mins  88254;  Medium Eval:                     mins  55975;   High Eval:                          mins  23876       Timed Treatment:   6   mins   Total Treatment:     37   mins    PT SIGNATURE: Karla Patel PT, DPT  [place] License: [id number]  Electronically signed by Karla Patel PT, 05/15/23, 10:52 AM EDT      Initial Certification    Certification Period: 5/15/2023 thru 8/12/2023  I certify that the therapy services are furnished while this patient is under my care.  The services outlined above are required by this patient, and will be reviewed every 90 days.     PHYSICIAN: Kari Harris MD  NPI:  [id number]            PHYSICIAN PRINT NAME: ______________________________________________      PHYSICIAN SIGNATURE: ______________________________________________         DATE:________________________________        Please sign and return via fax to [phone].  Thank you, [facility].

## 2023-05-17 DIAGNOSIS — M54.42 CHRONIC BILATERAL LOW BACK PAIN WITH BILATERAL SCIATICA: ICD-10-CM

## 2023-05-17 DIAGNOSIS — M54.41 CHRONIC BILATERAL LOW BACK PAIN WITH BILATERAL SCIATICA: ICD-10-CM

## 2023-05-17 DIAGNOSIS — G89.29 CHRONIC BILATERAL LOW BACK PAIN WITH BILATERAL SCIATICA: ICD-10-CM

## 2023-05-17 RX ORDER — CYCLOBENZAPRINE HCL 10 MG
10 TABLET ORAL NIGHTLY PRN
Qty: 30 TABLET | Refills: 1 | Status: SHIPPED | OUTPATIENT
Start: 2023-05-17

## 2023-05-22 ENCOUNTER — TELEPHONE (OUTPATIENT)
Dept: FAMILY MEDICINE CLINIC | Age: 73
End: 2023-05-22

## 2023-05-22 NOTE — TELEPHONE ENCOUNTER
Caller: Luisa Padilla    Relationship: Self    Best call back number: [phone]    What is the medical concern/diagnosis: VERTIGO    What specialty or service is being requested: PHYSICAL THERAPY    What is the office location: [facility] additional details: PLEASE SEND THE REFERRAL TO THE PHYSCIAL THERPAY DEPARTMENT AS SOON AS POSSIBLE.

## 2023-05-23 ENCOUNTER — TREATMENT (OUTPATIENT)
Dept: PHYSICAL THERAPY | Facility: CLINIC | Age: 73
End: 2023-05-23
Payer: MEDICARE

## 2023-05-23 DIAGNOSIS — R42 VERTIGO: Primary | ICD-10-CM

## 2023-05-23 DIAGNOSIS — R26.89 BALANCE PROBLEM: ICD-10-CM

## 2023-05-23 NOTE — PROGRESS NOTES
Physical Therapy Initial Evaluation and Plan of Care      Patient: Luisa Padilla   : 1950  Diagnosis/ICD-10 Code:  Vertigo [R42]  Referring practitioner: Kari Harris,*  Date of Initial Visit: 2023  Today's Date: 2023  Patient seen for 1 sessions         Visit Diagnoses:    ICD-10-CM ICD-9-CM   1. Vertigo  R42 780.4   2. Balance problem  R26.89 781.99         Subjective Evaluation    History of Present Illness  Mechanism of injury: On Saturday, Luisa woke up dizzy, couldn't go anywhere or do anything due to the dizziness/nausea.  She was vomitting all day, has stayed dizzy since then.  She was taking Phenergran and Dramamine to help.  She has never had vertigo like this before, but she does state that she does have dizziness.    She went to the urgent care on  morning, they tried a treatment where they turned her head, but she was told that PT has a better table to do the treatment.  Luisa thinks that it helped some, but still having dizziness and nausea.     She has been in bed the last three days.   Her  is driving her.     Pain  Exacerbated by: turning her head, laying down, moving positions.    Social Support  Lives in: one-story house  Lives with: spouse    Hand dominance: right             Objective           General Comments     Cervical/Thoracic Comments  Luisa has increased dizziness in sitting, standing, walking, laying down.    Nystagmus present on the L eye.  [place] + on the left, increased nausea, vomiting, dizziness    Decreased balance - pt feels like she is walking sideways. From waiting room to eval room, pt did have shuffling of feet and slower gait.     Increased sensitivity to light and loud noises (moving table).     Able to follow PT face, able to keep eyes open    Full cervical ROM all directions             Assessment & Plan     Assessment  Impairments: activity intolerance, impaired balance, lacks appropriate home exercise program and  safety issue    Assessment details: Luisa did have increased dizziness and vomiting with the Epleys maneuver today, but did feel better following.  The L side treatment exacerbated the vomiting, but after it was finished, dizziness was less, she was able to stand, as well as walk without increased symptoms. Nystagmus was present on the L, not present on the R.      Patient will benefit from PT services to address her deficits noted and progress to achieve their goals.       Goals  Plan Goals: 1. The patient reports dizziness   LTG 1: 12 weeks:  The patient will report a decrease in dizziness by 75% to improve tolerance to ADLs.    STATUS:  New   STG 1a: 4 weeks:  The patient will report a decrease in dizziness by 50%.    STATUS:  New   TREATMENT: Manual therapy, therapeutic exercise, home exercise instruction, cervical traction, and modalities as needed to include: moist heat, electrical stimulation, and ultrasound, canalith repositioning, epleys manuever.    2. The patient has decreased ability to ambulate    LTG 4: 12 weeks:  The patient will ambulate without assistance for 500 feet without loss of balance in order to safely ambulate in the community.     STATUS: New    STG 4a: 4 weeks: The patient will ambulate with SBA assistance for 250 feet.     STATUS:  New    TREATMENT: Therapeutic exercises, neuromuscular re-education, gait training, manual therapy, aquatic therapy, home exercise instruction, and modalities as needed for pain to include:  electrical stimulation, canalith reposition.    STG3:  12 weeks:  The patient will be independent with home exercises.     STATUS:  New            Plan  Therapy options: will be seen for skilled therapy services  Other planned modality interventions: canalith repositioning  Planned therapy interventions: ADL retraining, balance/weight-bearing training, functional ROM exercises, gait training, home exercise program, IADL retraining, neuromuscular re-education, postural  training, strengthening, stretching and therapeutic activities  Frequency: 3x week (depending on symptoms flare up, if no symptoms, only 1 x week)  Duration in weeks: 12  Treatment plan discussed with: patient  Plan details: Progress with canalith repositioning, postural awareness, balance and gait training, coordination, HEP            History # of Personal Factors and/or Comorbidities: MODERATE (1-2)  Examination of Body System(s): # of elements: LOW (1-2)  Clinical Presentation: EVOLVING  Clinical Decision Making: LOW     Timed:  Manual Therapy:         mins  21377;  Therapeutic Exercise:         mins  41420;     Neuromuscular Felipe:        mins  52426;    Therapeutic Activity:          mins  25584;     Gait Training:           mins  61500;     Ultrasound:          mins  45112;    Canalith Repos           __6_  mins  26150      Untimed:  Electrical Stimulation:         mins  36225 ( );  Mechanical Traction:         mins  00330;   Dry Needling:                     mins self pay  Low Eval:                     30      mins  38793;  Medium Eval:                     mins  70233;   High Eval:                          mins  33905       Timed Treatment:  6    mins   Total Treatment:     36   mins    PT SIGNATURE: Karla Patel PT, DPT  [place] License: [id number]  Electronically signed by Karla Patel PT, 05/23/23, 8:07 AM EDT      Initial Certification    Certification Period: 5/23/2023 thru 8/20/2023  I certify that the therapy services are furnished while this patient is under my care.  The services outlined above are required by this patient, and will be reviewed every 90 days.     PHYSICIAN: Kari Harris MD  NPI: [id number]            PHYSICIAN PRINT NAME: ______________________________________________      PHYSICIAN SIGNATURE: ______________________________________________         DATE:________________________________        Please sign and return via fax to [phone].  Thank you, [facility].

## 2023-05-24 ENCOUNTER — TREATMENT (OUTPATIENT)
Dept: PHYSICAL THERAPY | Facility: CLINIC | Age: 73
End: 2023-05-24
Payer: MEDICARE

## 2023-05-24 ENCOUNTER — TELEPHONE (OUTPATIENT)
Dept: FAMILY MEDICINE CLINIC | Age: 73
End: 2023-05-24
Payer: MEDICARE

## 2023-05-24 DIAGNOSIS — R42 VERTIGO: Primary | ICD-10-CM

## 2023-05-24 DIAGNOSIS — R26.89 BALANCE PROBLEM: ICD-10-CM

## 2023-05-24 NOTE — PROGRESS NOTES
Physical Therapy Daily Treatment Note      Patient: Luisa Padilla   : 1950  Referring practitioner: Kari Harris,*  Date of Initial Visit: Type: THERAPY  Noted: 2023  Today's Date: 2023  Patient seen for 2 sessions           Visit Diagnoses:    ICD-10-CM ICD-9-CM   1. Vertigo  R42 780.4   2. Balance problem  R26.89 781.99       Subjective Evaluation    History of Present Illness  Mechanism of injury: Luisa continues to have dizziness and vertigo like symptoms.  She threw up all day yesterday.              Objective   See Exercise, Manual, and Modality Logs for complete treatment.       Assessment & Plan     Assessment  Impairments: activity intolerance, impaired balance, lacks appropriate home exercise program and safety issue    Assessment details: Luisa continues to have have increased dizziness and vomiting with the Epleys maneuver today going towards the left, but did feel better following.  Once she sat up and walked around for a few minutes, the symptoms came back.  The maneuver was performed again, no vomiting with left side rotation this time.  However, when she sat back up, she had vomiting.  She sat on incline table for a bit to get resorted, she had a headache.          Goals  Plan Goals: 1. The patient reports dizziness   LTG 1: 12 weeks:  The patient will report a decrease in dizziness by 75% to improve tolerance to ADLs.    STATUS:  Progressing   STG 1a: 4 weeks:  The patient will report a decrease in dizziness by 50%.    STATUS:  Progressing   TREATMENT: Manual therapy, therapeutic exercise, home exercise instruction, cervical traction, and modalities as needed to include: moist heat, electrical stimulation, and ultrasound, canalith repositioning, epleys manuever.    2. The patient has decreased ability to ambulate    LTG 4: 12 weeks:  The patient will ambulate without assistance for 500 feet without loss of balance in order to safely ambulate in the community.      STATUS: Progressing   STG 4a: 4 weeks: The patient will ambulate with SBA assistance for 250 feet.     STATUS:  Progressing   TREATMENT: Therapeutic exercises, neuromuscular re-education, gait training, manual therapy, aquatic therapy, home exercise instruction, and modalities as needed for pain to include:  electrical stimulation, canalith reposition.    STG3:  12 weeks:  The patient will be independent with home exercises.     STATUS:  Progressing            Plan  Therapy options: will be seen for skilled therapy services  Other planned modality interventions: canalith repositioning  Planned therapy interventions: ADL retraining, balance/weight-bearing training, functional ROM exercises, gait training, home exercise program, IADL retraining, neuromuscular re-education, postural training, strengthening, stretching and therapeutic activities  Frequency: 3x week (depending on symptoms flare up, if no symptoms, only 1 x week)  Duration in weeks: 12  Treatment plan discussed with: patient  Plan details: Progress with canalith repositioning, decreased dizzy/vomitting          Progress per Plan of Care and Progress strengthening /stabilization /functional activity           Timed:  Manual Therapy:         mins  58398;  Therapeutic Exercise:         mins  94851;     Neuromuscular Felipe:        mins  23753;    Therapeutic Activity:          mins  99427;     Gait Training:           mins  22129;     Ultrasound:          mins  34714;    Canalith Repos           __20_  mins  76195      Untimed:  Electrical Stimulation:         mins  36482 ( );  Mechanical Traction:         mins  88203;   Dry Needling:                     mins self pay       Timed Treatment:   20  mins   Total Treatment:     45   mins      Karla Patel PT, [place]  [place] License #: [id number]

## 2023-05-24 NOTE — TELEPHONE ENCOUNTER
----- Message from Kari Harris MD sent at 5/10/2023  9:27 AM EDT -----  Please run manual Lanre.  Thanks, CHRISTEN

## 2023-05-26 ENCOUNTER — LAB (OUTPATIENT)
Dept: LAB | Facility: HOSPITAL | Age: 73
End: 2023-05-26
Payer: MEDICARE

## 2023-05-26 ENCOUNTER — OFFICE VISIT (OUTPATIENT)
Dept: FAMILY MEDICINE CLINIC | Age: 73
End: 2023-05-26
Payer: MEDICARE

## 2023-05-26 VITALS
OXYGEN SATURATION: 99 % | HEART RATE: 70 BPM | SYSTOLIC BLOOD PRESSURE: 123 MMHG | BODY MASS INDEX: 26.12 KG/M2 | WEIGHT: 153 LBS | DIASTOLIC BLOOD PRESSURE: 47 MMHG | HEIGHT: 64 IN | TEMPERATURE: 97.9 F

## 2023-05-26 DIAGNOSIS — R42 DIZZINESS: Primary | ICD-10-CM

## 2023-05-26 DIAGNOSIS — Z83.2 FAMILY HISTORY OF FACTOR V LEIDEN MUTATION: ICD-10-CM

## 2023-05-26 PROCEDURE — 1160F RVW MEDS BY RX/DR IN RCRD: CPT | Performed by: FAMILY MEDICINE

## 2023-05-26 PROCEDURE — 36415 COLL VENOUS BLD VENIPUNCTURE: CPT

## 2023-05-26 PROCEDURE — 3074F SYST BP LT 130 MM HG: CPT | Performed by: FAMILY MEDICINE

## 2023-05-26 PROCEDURE — 81241 F5 GENE: CPT

## 2023-05-26 PROCEDURE — 3078F DIAST BP <80 MM HG: CPT | Performed by: FAMILY MEDICINE

## 2023-05-26 PROCEDURE — 1159F MED LIST DOCD IN RCRD: CPT | Performed by: FAMILY MEDICINE

## 2023-05-26 PROCEDURE — 99214 OFFICE O/P EST MOD 30 MIN: CPT | Performed by: FAMILY MEDICINE

## 2023-05-26 RX ORDER — MECLIZINE HYDROCHLORIDE 25 MG/1
25 TABLET ORAL 3 TIMES DAILY PRN
Qty: 30 TABLET | Refills: 0 | Status: SHIPPED | OUTPATIENT
Start: 2023-05-26

## 2023-05-26 RX ORDER — ONDANSETRON 4 MG/1
4 TABLET, ORALLY DISINTEGRATING ORAL EVERY 8 HOURS PRN
Qty: 30 TABLET | Refills: 0 | Status: SHIPPED | OUTPATIENT
Start: 2023-05-26

## 2023-05-26 RX ORDER — FLUTICASONE PROPIONATE 50 MCG
2 SPRAY, SUSPENSION (ML) NASAL 2 TIMES DAILY
Qty: 16 G | Refills: 0 | Status: SHIPPED | OUTPATIENT
Start: 2023-05-26

## 2023-05-26 NOTE — PROGRESS NOTES
"Chief Complaint  Dizziness (Nausea & Vomiting x 6 days )    Subjective          Luisa Padilla presents to [facility] today for acute complaint of dizziness, nausea and vomiting for the past 6 days.    Sx started Saturday morning and was getting ready for the day.  She felt like she was going \"sideways\" across the floor.  She describes her dizziness as \"car sickness.\"  Not lightheadedness, vertigo/room spinning or imbalance/unsteadiness.  Watching screens with movement make it worse.  She feels okay when she first wakes up, but after she has stood up for a few minutes, the dizziness kicks in and lasts all day.  Moving her head from side to side does bring it on.  Lying down in bed and rolling over does NOT.   She has been seeing Physical Therapy for vertigo with Epley maneuvers, but despite 2 treatments now, she is still no better.  She felt like the therapy was making her worse.  She went to the chiropractor yesterday and she does feel a little bit better today.    She had one episode of headache, like a band around the head with pain above that.  \"The worst headache I've ever had.\"  It lasted only a couple minutes, after undergoing an Epley maneuver.  Then it just subsided on its own.  No photophobia, no phonophobia.      She has family history of Factor V Leiden prior to her going for scope with Dr. Haddad.      Current Outpatient Medications:   •  atorvastatin (LIPITOR) 40 MG tablet, TAKE 1 TABLET EVERY DAY, Disp: 90 tablet, Rfl: 1  •  carvedilol (COREG) 3.125 MG tablet, TAKE 1 TABLET EVERY NIGHT, Disp: 90 tablet, Rfl: 1  •  cyclobenzaprine (FLEXERIL) 10 MG tablet, Take 1 tablet by mouth At Night As Needed for Muscle Spasms., Disp: 30 tablet, Rfl: 1  •  Diclofenac Sodium (VOLTAREN) 1 % gel gel, APPLY 4 GM TOPICALLY TO THE APPROPRIATE AREA AS DIRECTED 4 TIMES A DAY AS NEEDED (ARTHRITIS) FOR UP TO 30 DAYS., Disp: 100 g, Rfl: 2  •  famotidine (PEPCID) 40 MG tablet, Take 1 tablet by " "mouth Daily., Disp: 90 tablet, Rfl: 0  •  gabapentin (NEURONTIN) 100 MG capsule, TAKE 2 TO 3 CAPSULES 3 TIMES DAILY AS NEEDED, Disp: 270 capsule, Rfl: 2  •  guaiFENesin (MUCINEX) 600 MG 12 hr tablet, Take 2 tablets by mouth 2 (Two) Times a Day., Disp: , Rfl:   •  hydrOXYzine (ATARAX) 25 MG tablet, TAKE 1/2 TO 1 TABLET EVERY 6 HOURS DURING THE DAY AND 1 TO 2 TABLETS EVERY NIGHT AS NEEDED FOR ANXIETY, Disp: 180 tablet, Rfl: 1  •  levothyroxine (SYNTHROID, LEVOTHROID) 50 MCG tablet, TAKE 1 TABLET EVERY DAY, Disp: 90 tablet, Rfl: 1  •  meloxicam (MOBIC) 15 MG tablet, TAKE 1 TABLET BY MOUTH DAILY AS NEEDED FOR MODERATE PAIN, Disp: 90 tablet, Rfl: 1  •  omeprazole (priLOSEC) 40 MG capsule, TAKE 1 CAPSULE EVERY DAY, Disp: 90 capsule, Rfl: 1  •  fluticasone (FLONASE) 50 MCG/ACT nasal spray, 2 sprays into the nostril(s) as directed by provider 2 (Two) Times a Day., Disp: 16 g, Rfl: 0  •  meclizine (ANTIVERT) 25 MG tablet, Take 1 tablet by mouth 3 (Three) Times a Day As Needed for Dizziness., Disp: 30 tablet, Rfl: 0  •  ondansetron ODT (ZOFRAN-ODT) 4 MG disintegrating tablet, Place 1 tablet on the tongue Every 8 (Eight) Hours As Needed for Nausea or Vomiting., Disp: 30 tablet, Rfl: 0    Allergies:  Codeine, Hydroxyzine, and Oxycodone-acetaminophen      Objective   Vital Signs:   Vitals:    05/26/23 1433   BP: 123/47   BP Location: Left arm   Patient Position: Sitting   Cuff Size: Adult   Pulse: 70   Temp: 97.9 °F (36.6 °C)   TempSrc: Oral   SpO2: 99%   Weight: 69.4 kg (153 lb)   Height: 163.8 cm (64.49\")       Physical Exam  Vitals reviewed.   Constitutional:       General: She is not in acute distress.     Appearance: Normal appearance. She is well-developed.   HENT:      Head: Normocephalic and atraumatic.      Right Ear: External ear normal. A middle ear effusion is present.      Left Ear: External ear normal. A middle ear effusion is present.   Eyes:      Extraocular Movements: Extraocular movements intact.      " Conjunctiva/sclera: Conjunctivae normal.      Pupils: Pupils are equal, round, and reactive to light.   Cardiovascular:      Rate and Rhythm: Normal rate and regular rhythm.      Heart sounds: No murmur heard.  Pulmonary:      Effort: Pulmonary effort is normal.      Breath sounds: Normal breath sounds. No wheezing, rhonchi or rales.   Abdominal:      General: Bowel sounds are normal. There is no distension.      Palpations: Abdomen is soft.      Tenderness: There is no abdominal tenderness.   Musculoskeletal:         General: Tenderness (bilateral cervical paraspinals (L>R)) present. Normal range of motion.   Neurological:      Mental Status: She is alert.   Psychiatric:         Mood and Affect: Affect normal.           Lab Results   Component Value Date    GLUCOSE 113 (H) 02/02/2023    BUN 8 02/02/2023    CREATININE 1.10 (H) 02/02/2023    EGFRIFNONA 60 01/12/2022    EGFRIFAFRI 69 01/12/2022    BCR 7.3 02/02/2023    K 4.1 02/02/2023    CO2 26.0 02/02/2023    CALCIUM 9.7 02/02/2023    PROTENTOTREF 6.6 01/12/2022    ALBUMIN 4.2 02/02/2023    LABIL2 2.0 01/12/2022    AST 23 02/02/2023    ALT 20 02/02/2023       Lab Results   Component Value Date    CHOL 183 02/02/2023    CHLPL 165 01/12/2022    TRIG 140 02/02/2023    HDL 55 02/02/2023     (H) 02/02/2023            Assessment and Plan    Diagnoses and all orders for this visit:    1. Dizziness (Primary)  Assessment & Plan:  Her dizziness is really not consistent with vertigo.  I do not think this is BPPV based on her description of symptoms.  She does have a positional component to it, which makes me wonder if this could be more of an inner ear problem.  She does have fluid evident on the bilateral ears today on exam.  She is not using any allergy medications because she does not note any libby allergy symptoms.  I am going to have her start on Flonase twice daily and I will see her back in 1 week to follow-up.  She does have some pretty significant tenderness  to palpation of the bilateral cervical paraspinals.  She has not been using her muscle relaxer at home, so I am going to ask her to use that over the next week as well in case this is possibly a cervicogenic headache syndrome instead.  She also has notable motion sickness at baseline, and this feels very similar to her motion sickness, but it is just not easing up and it does not seem to be provoked by motion.  That being the case, if the Flonase is ineffective when she comes back next week, I might consider putting her on scopolamine patches for a time and see if we can get better control of the symptoms in that way.  I have sent in meclizine and Zofran for symptomatic control over the next 7 days.  No red flags noted at this time.  We may consider head imaging should symptoms persist.    Orders:  -     fluticasone (FLONASE) 50 MCG/ACT nasal spray; 2 sprays into the nostril(s) as directed by provider 2 (Two) Times a Day.  Dispense: 16 g; Refill: 0  -     meclizine (ANTIVERT) 25 MG tablet; Take 1 tablet by mouth 3 (Three) Times a Day As Needed for Dizziness.  Dispense: 30 tablet; Refill: 0  -     ondansetron ODT (ZOFRAN-ODT) 4 MG disintegrating tablet; Place 1 tablet on the tongue Every 8 (Eight) Hours As Needed for Nausea or Vomiting.  Dispense: 30 tablet; Refill: 0    2. Family history of factor V Leiden mutation  Assessment & Plan:  Family history of factor V Leiden.  She is going for outpatient procedure at the office with Dr. Boo LAWSON who requests that she be evaluated for factor V Leiden herself.  Labs placed today.    Orders:  -     Factor 5 Leiden; Future      Follow Up   Return in 1 week (on 6/2/2023) for recheck (OK to use acute spot).  Patient was given instructions and counseling regarding her condition or for health maintenance advice. Please see specific information pulled into the AVS if appropriate.           05/26/2023

## 2023-05-26 NOTE — ASSESSMENT & PLAN NOTE
Her dizziness is really not consistent with vertigo.  I do not think this is BPPV based on her description of symptoms.  She does have a positional component to it, which makes me wonder if this could be more of an inner ear problem.  She does have fluid evident on the bilateral ears today on exam.  She is not using any allergy medications because she does not note any libby allergy symptoms.  I am going to have her start on Flonase twice daily and I will see her back in 1 week to follow-up.  She does have some pretty significant tenderness to palpation of the bilateral cervical paraspinals.  She has not been using her muscle relaxer at home, so I am going to ask her to use that over the next week as well in case this is possibly a cervicogenic headache syndrome instead.  She also has notable motion sickness at baseline, and this feels very similar to her motion sickness, but it is just not easing up and it does not seem to be provoked by motion.  That being the case, if the Flonase is ineffective when she comes back next week, I might consider putting her on scopolamine patches for a time and see if we can get better control of the symptoms in that way.  I have sent in meclizine and Zofran for symptomatic control over the next 7 days.  No red flags noted at this time.  We may consider head imaging should symptoms persist.

## 2023-05-26 NOTE — ASSESSMENT & PLAN NOTE
Family history of factor V Leiden.  She is going for outpatient procedure at the office with Dr. Boo LAWSON who requests that she be evaluated for factor V Leiden herself.  Labs placed today.

## 2023-05-29 LAB — F5 GENE MUT ANL BLD/T: NORMAL

## 2023-06-02 ENCOUNTER — OFFICE VISIT (OUTPATIENT)
Dept: FAMILY MEDICINE CLINIC | Age: 73
End: 2023-06-02

## 2023-06-02 VITALS
BODY MASS INDEX: 26.29 KG/M2 | SYSTOLIC BLOOD PRESSURE: 112 MMHG | WEIGHT: 154 LBS | HEIGHT: 64 IN | OXYGEN SATURATION: 99 % | DIASTOLIC BLOOD PRESSURE: 75 MMHG | HEART RATE: 75 BPM

## 2023-06-02 DIAGNOSIS — R42 DIZZINESS: ICD-10-CM

## 2023-06-02 PROCEDURE — 1160F RVW MEDS BY RX/DR IN RCRD: CPT | Performed by: FAMILY MEDICINE

## 2023-06-02 PROCEDURE — 99214 OFFICE O/P EST MOD 30 MIN: CPT | Performed by: FAMILY MEDICINE

## 2023-06-02 PROCEDURE — 1159F MED LIST DOCD IN RCRD: CPT | Performed by: FAMILY MEDICINE

## 2023-06-02 PROCEDURE — 3078F DIAST BP <80 MM HG: CPT | Performed by: FAMILY MEDICINE

## 2023-06-02 PROCEDURE — 3074F SYST BP LT 130 MM HG: CPT | Performed by: FAMILY MEDICINE

## 2023-06-02 RX ORDER — SCOLOPAMINE TRANSDERMAL SYSTEM 1 MG/1
1 PATCH, EXTENDED RELEASE TRANSDERMAL
Qty: 5 PATCH | Refills: 0 | Status: SHIPPED | OUTPATIENT
Start: 2023-06-02

## 2023-06-02 RX ORDER — MECLIZINE HYDROCHLORIDE 25 MG/1
25 TABLET ORAL 3 TIMES DAILY PRN
Qty: 30 TABLET | Refills: 0 | Status: SHIPPED | OUTPATIENT
Start: 2023-06-02

## 2023-06-02 NOTE — ASSESSMENT & PLAN NOTE
Symptoms do seem to be improving with use of the Flonase (although she does report she has missed some doses, so she will try to do better about not skipping those).  Fluid on the left ear at least looks decreased on physical exam.  I would like her to continue treatment with the Flonase 2 sprays twice daily for the next 2 weeks.  I will see her back at the end of that time.  Given that the dizziness is still giving her issues somewhat frequently, I am going to offer her use of scopolamine patches for the next 2 weeks as well.  I will also send in some meclizine refills for her to use in case she prefers that over the scopolamine.  No refills needed on the Zofran.

## 2023-06-02 NOTE — PROGRESS NOTES
"Chief Complaint  Dizziness (1 week f/u)    Subjective          Luisa Padilla presents to [facility] today to follow up again on acute complaint of dizziness.    She thinks the dizziness is better.  Getting the dizzy spells 2-3 times a day, but this is less constant than it was last time I saw her.  No new character to the dizziness.  Still feels like motion sickness.      HPI 5/26/2023:  Sx started Saturday morning and was getting ready for the day.  She felt like she was going \"sideways\" across the floor.  She describes her dizziness as \"car sickness.\"  Not lightheadedness, vertigo/room spinning or imbalance/unsteadiness.  Watching screens with movement make it worse.  She feels okay when she first wakes up, but after she stands up for a few minutes, the dizziness kicks in and lasts all day.  Moving her head from side to side does bring it on.  Lying down in bed and rolling over does NOT.  She has been seeing Physical Therapy for vertigo with Epley maneuvers, but despite 2 treatments now, she is still no better.  She felt like the therapy was making her worse.  She went to the chiropractor yesterday and she does feel a little bit better today.  She had one episode of headache, like a band around the head with pain above that.  \"The worst headache I've ever had.\"  It lasted only a couple minutes, after undergoing an Epley maneuver.  Then it just subsided on its own.  No photophobia, no phonophobia.          Current Outpatient Medications:   •  atorvastatin (LIPITOR) 40 MG tablet, TAKE 1 TABLET EVERY DAY, Disp: 90 tablet, Rfl: 1  •  carvedilol (COREG) 3.125 MG tablet, TAKE 1 TABLET EVERY NIGHT, Disp: 90 tablet, Rfl: 1  •  cyclobenzaprine (FLEXERIL) 10 MG tablet, Take 1 tablet by mouth At Night As Needed for Muscle Spasms., Disp: 30 tablet, Rfl: 1  •  Diclofenac Sodium (VOLTAREN) 1 % gel gel, APPLY 4 GM TOPICALLY TO THE APPROPRIATE AREA AS DIRECTED 4 TIMES A DAY AS NEEDED " "(ARTHRITIS) FOR UP TO 30 DAYS., Disp: 100 g, Rfl: 2  •  famotidine (PEPCID) 40 MG tablet, Take 1 tablet by mouth Daily., Disp: 90 tablet, Rfl: 0  •  fluticasone (FLONASE) 50 MCG/ACT nasal spray, 2 sprays into the nostril(s) as directed by provider 2 (Two) Times a Day., Disp: 16 g, Rfl: 0  •  gabapentin (NEURONTIN) 100 MG capsule, TAKE 2 TO 3 CAPSULES 3 TIMES DAILY AS NEEDED, Disp: 270 capsule, Rfl: 2  •  guaiFENesin (MUCINEX) 600 MG 12 hr tablet, Take 2 tablets by mouth 2 (Two) Times a Day., Disp: , Rfl:   •  hydrOXYzine (ATARAX) 25 MG tablet, TAKE 1/2 TO 1 TABLET EVERY 6 HOURS DURING THE DAY AND 1 TO 2 TABLETS EVERY NIGHT AS NEEDED FOR ANXIETY, Disp: 180 tablet, Rfl: 1  •  levothyroxine (SYNTHROID, LEVOTHROID) 50 MCG tablet, TAKE 1 TABLET EVERY DAY, Disp: 90 tablet, Rfl: 1  •  meclizine (ANTIVERT) 25 MG tablet, Take 1 tablet by mouth 3 (Three) Times a Day As Needed for Dizziness., Disp: 30 tablet, Rfl: 0  •  meloxicam (MOBIC) 15 MG tablet, TAKE 1 TABLET BY MOUTH DAILY AS NEEDED FOR MODERATE PAIN, Disp: 90 tablet, Rfl: 1  •  omeprazole (priLOSEC) 40 MG capsule, TAKE 1 CAPSULE EVERY DAY, Disp: 90 capsule, Rfl: 1  •  ondansetron ODT (ZOFRAN-ODT) 4 MG disintegrating tablet, Place 1 tablet on the tongue Every 8 (Eight) Hours As Needed for Nausea or Vomiting., Disp: 30 tablet, Rfl: 0  •  Scopolamine 1 MG/3DAYS patch, Place 1 patch on the skin as directed by provider Every 72 (Seventy-Two) Hours., Disp: 5 patch, Rfl: 0    Allergies:  Codeine, Hydroxyzine, and Oxycodone-acetaminophen      Objective   Vital Signs:   Vitals:    06/02/23 1404   BP: 112/75   BP Location: Left arm   Patient Position: Sitting   Cuff Size: Adult   Pulse: 75   SpO2: 99%   Weight: 69.9 kg (154 lb)   Height: 163.8 cm (64.49\")       Physical Exam  Vitals reviewed.   Constitutional:       General: She is not in acute distress.     Appearance: Normal appearance. She is well-developed.   HENT:      Head: Normocephalic and atraumatic.      Right Ear: " External ear normal. A middle ear effusion is present.      Left Ear: External ear normal. A middle ear effusion (Improved from last week) is present.   Eyes:      Extraocular Movements: Extraocular movements intact.      Conjunctiva/sclera: Conjunctivae normal.      Pupils: Pupils are equal, round, and reactive to light.   Cardiovascular:      Heart sounds: No murmur heard.  Pulmonary:      Effort: Pulmonary effort is normal.   Abdominal:      Tenderness: There is no abdominal tenderness.   Musculoskeletal:         General: Normal range of motion.   Neurological:      Mental Status: She is alert.   Psychiatric:         Mood and Affect: Affect normal.           Lab Results   Component Value Date    GLUCOSE 113 (H) 02/02/2023    BUN 8 02/02/2023    CREATININE 1.10 (H) 02/02/2023    EGFRIFNONA 60 01/12/2022    EGFRIFAFRI 69 01/12/2022    BCR 7.3 02/02/2023    K 4.1 02/02/2023    CO2 26.0 02/02/2023    CALCIUM 9.7 02/02/2023    PROTENTOTREF 6.6 01/12/2022    ALBUMIN 4.2 02/02/2023    LABIL2 2.0 01/12/2022    AST 23 02/02/2023    ALT 20 02/02/2023       Lab Results   Component Value Date    CHOL 183 02/02/2023    CHLPL 165 01/12/2022    TRIG 140 02/02/2023    HDL 55 02/02/2023     (H) 02/02/2023            Assessment and Plan    Diagnoses and all orders for this visit:    1. Dizziness  Assessment & Plan:  Symptoms do seem to be improving with use of the Flonase (although she does report she has missed some doses, so she will try to do better about not skipping those).  Fluid on the left ear at least looks decreased on physical exam.  I would like her to continue treatment with the Flonase 2 sprays twice daily for the next 2 weeks.  I will see her back at the end of that time.  Given that the dizziness is still giving her issues somewhat frequently, I am going to offer her use of scopolamine patches for the next 2 weeks as well.  I will also send in some meclizine refills for her to use in case she prefers that  over the scopolamine.  No refills needed on the Zofran.    Orders:  -     Scopolamine 1 MG/3DAYS patch; Place 1 patch on the skin as directed by provider Every 72 (Seventy-Two) Hours.  Dispense: 5 patch; Refill: 0  -     meclizine (ANTIVERT) 25 MG tablet; Take 1 tablet by mouth 3 (Three) Times a Day As Needed for Dizziness.  Dispense: 30 tablet; Refill: 0      Follow Up   Return in 2 weeks (on 6/16/2023).  Patient was given instructions and counseling regarding her condition or for health maintenance advice. Please see specific information pulled into the AVS if appropriate.           05/26/2023

## 2023-06-06 RX ORDER — ONDANSETRON 4 MG/1
TABLET, FILM COATED ORAL
Qty: 60 TABLET | Refills: 0 | Status: SHIPPED | OUTPATIENT
Start: 2023-06-06

## 2023-06-07 ENCOUNTER — TREATMENT (OUTPATIENT)
Dept: PHYSICAL THERAPY | Facility: CLINIC | Age: 73
End: 2023-06-07
Payer: MEDICARE

## 2023-06-07 DIAGNOSIS — G89.29 CHRONIC BILATERAL LOW BACK PAIN WITH BILATERAL SCIATICA: Primary | ICD-10-CM

## 2023-06-07 DIAGNOSIS — M54.41 CHRONIC BILATERAL LOW BACK PAIN WITH BILATERAL SCIATICA: Primary | ICD-10-CM

## 2023-06-07 DIAGNOSIS — R29.898 WEAKNESS OF BOTH LEGS: ICD-10-CM

## 2023-06-07 DIAGNOSIS — R29.898 DECREASED STRENGTH OF TRUNK AND BACK: ICD-10-CM

## 2023-06-07 DIAGNOSIS — M54.42 CHRONIC BILATERAL LOW BACK PAIN WITH BILATERAL SCIATICA: Primary | ICD-10-CM

## 2023-06-07 NOTE — PROGRESS NOTES
Physical Therapy Daily Treatment Note      Patient: Luisa Padilla   : 1950  Referring practitioner: Kari Harris,*  Date of Initial Visit: Type: THERAPY  Noted: 5/15/2023  Today's Date: 2023  Patient seen for 2 sessions           Visit Diagnoses:    ICD-10-CM ICD-9-CM   1. Chronic bilateral low back pain with bilateral sciatica  M54.42 724.2    M54.41 724.3    G89.29 338.29   2. Decreased strength of trunk and back  R29.898 729.89   3. Weakness of both legs  R29.898 729.89       Subjective Evaluation    History of Present Illness    Subjective comment: There is no pain today in the back today       Objective   See Exercise, Manual, and Modality Logs for complete treatment.       Assessment & Plan     Assessment  Impairments: abnormal or restricted ROM, impaired physical strength, lacks appropriate home exercise program and pain with function    Assessment details: All activities performed today on table with an incline, no supine due to increased dizziness over the last weeks.  Progressed with gentle lumbar stretching and core/LE strengthening.        Goals  Plan Goals: LOW BACK PROBLEMS:    1. The patient complains of low back pain.  LTG 1: 12 weeks:  The patient will report a pain rating of 2/10 or better in order to improve tolerance to activities of daily living and improve sleep quality.  STATUS:  Progressing  STG 1a: 4 weeks:  The patient will report a pain rating of 4/10 or better.  STATUS:  Progressing  TREATMENT:  Therapeutic exercises, manual therapy, aquatic therapy, home exercise instruction, and modalities as needed for pain to include:  electrical stimulation, moist heat, ice, ultrasound, and diathermy.      2. The patient demonstrates weakness of the bilateral hip.  LTG 2: 12 weeks:  The patient will demonstrate 4+ /5 strength for bilateral hip flexion, abduction, and extension in order to improve hip stability.  STATUS:  Progressing  STG 2a: 4 weeks:  The patient will  demonstrate 4 /5 strength for bilateral hip flexion, abduction, and extension.  STATUS:  Progressing  STG2b:  4 weeks:  The patient will be independent with home exercises.     STATUS: Progressing  TREATMENT: Therapeutic exercises, manual therapy, aquatic therapy, home exercise instruction, and modalities as needed for pain to include:  electrical stimulation, moist heat, ice, ultrasound, and diathermy.      3. The patient has gait dysfunction.  LTG 3: 12 weeks:  The patient will ambulate without assistive device, independently, for community distances with minimal limp to the left lower extremity in order to improve mobility and allow patient to perform activities such as grocery shopping with greater ease.  STATUS:  Progressing  TREATMENT: Gait training, aquatic therapy, therapeutic exercise, and home exercise instruction.      4. Mobility: Walking/Moving Around Functional Limitation    LTG 4: 12 weeks:  The patient will demonstrate limitation by achieving a score of 11/45 on the CLAUDIO.  STATUS:  Progressing  STG 4 a: 4 weeks:  The patient will demonstrate limitation by achieving a score of 15/45 on the CLAUDIO.    STATUS:  Progressing  TREATMENT:  Manual therapy, therapeutic exercise, home exercise instruction, and modalities as needed.      Plan  Therapy options: will be seen for skilled therapy services  Planned modality interventions: cryotherapy, thermotherapy (hydrocollator packs), dry needling, TENS and ultrasound  Other planned modality interventions: NO TRACTION - fusion  Planned therapy interventions: abdominal trunk stabilization, manual therapy, flexibility, functional ROM exercises, gait training, home exercise program, therapeutic activities, stretching, strengthening, spinal/joint mobilization, soft tissue mobilization, postural training, neuromuscular re-education, body mechanics training, ADL retraining, balance/weight-bearing training and motor coordination training  Frequency: 1x week  Duration in  weeks: 12  Treatment plan discussed with: patient  Plan details: Continue with lower back/core strength, trunk control/postural awareness, improved ROM/flexibility        Progress per Plan of Care and Progress strengthening /stabilization /functional activity           Timed:  Manual Therapy:         mins  20869;  Therapeutic Exercise:    15     mins  93655;     Neuromuscular Felipe:    14    mins  41150;    Therapeutic Activity:          mins  81159;     Gait Training:           mins  16870;     Ultrasound:          mins  40101;    Canalith Repos           ___  mins  56677      Untimed:  Electrical Stimulation:         mins  15484 ( );  Mechanical Traction:         mins  53582;   Dry Needling:                     mins self pay       Timed Treatment:   29   mins   Total Treatment:     30   mins      Karla Patel PT, DPT  [place] License #: [id number]

## 2023-06-12 ENCOUNTER — TREATMENT (OUTPATIENT)
Dept: PHYSICAL THERAPY | Facility: CLINIC | Age: 73
End: 2023-06-12
Payer: MEDICARE

## 2023-06-12 DIAGNOSIS — R29.898 WEAKNESS OF BOTH LEGS: ICD-10-CM

## 2023-06-12 DIAGNOSIS — M54.42 CHRONIC BILATERAL LOW BACK PAIN WITH BILATERAL SCIATICA: Primary | ICD-10-CM

## 2023-06-12 DIAGNOSIS — G89.29 CHRONIC BILATERAL LOW BACK PAIN WITH BILATERAL SCIATICA: Primary | ICD-10-CM

## 2023-06-12 DIAGNOSIS — R29.898 DECREASED STRENGTH OF TRUNK AND BACK: ICD-10-CM

## 2023-06-12 DIAGNOSIS — M54.41 CHRONIC BILATERAL LOW BACK PAIN WITH BILATERAL SCIATICA: Primary | ICD-10-CM

## 2023-06-12 PROCEDURE — 97530 THERAPEUTIC ACTIVITIES: CPT | Performed by: PHYSICAL THERAPIST

## 2023-06-12 PROCEDURE — 97110 THERAPEUTIC EXERCISES: CPT | Performed by: PHYSICAL THERAPIST

## 2023-06-12 NOTE — PROGRESS NOTES
Physical Therapy Daily Treatment Note      Patient: Luisa Padilla   : 1950  Referring practitioner: Kari Harris,*  Date of Initial Visit: Type: THERAPY  Noted: 5/15/2023  Today's Date: 2023  Patient seen for 3 sessions           Visit Diagnoses:    ICD-10-CM ICD-9-CM   1. Chronic bilateral low back pain with bilateral sciatica  M54.42 724.2    M54.41 724.3    G89.29 338.29   2. Decreased strength of trunk and back  R29.898 729.89   3. Weakness of both legs  R29.898 729.89       Subjective Evaluation    History of Present Illness    Subjective comment: 5/10 pain, mainly on the left leg, the left leg is worse than the back.  She sees PCP on Wednesday for a follow up with dizziness, she is having a colonoscopy on Thursday.       Objective   See Exercise, Manual, and Modality Logs for complete treatment.       Assessment & Plan     Assessment  Impairments: abnormal or restricted ROM, impaired physical strength, lacks appropriate home exercise program and pain with function    Assessment details: All activities performed today on table with an incline, no supine due to increased dizziness over the last weeks (goes back Wed for a follow up with PCP).  Progressed with gentle lumbar stretching and core/LE strengthening.  She continues to have more pain in the L leg than the actual back itself.  She did bring in her MRI impression from , scanned into [facility].  She does have bulging disc.          Goals  Plan Goals: LOW BACK PROBLEMS:    1. The patient complains of low back pain.  LTG 1: 12 weeks:  The patient will report a pain rating of 2/10 or better in order to improve tolerance to activities of daily living and improve sleep quality.  STATUS:  Progressing  STG 1a: 4 weeks:  The patient will report a pain rating of 4/10 or better.  STATUS:  Progressing  TREATMENT:  Therapeutic exercises, manual therapy, aquatic therapy, home exercise instruction, and modalities as needed for pain to include:   electrical stimulation, moist heat, ice, ultrasound, and diathermy.      2. The patient demonstrates weakness of the bilateral hip.  LTG 2: 12 weeks:  The patient will demonstrate 4+ /5 strength for bilateral hip flexion, abduction, and extension in order to improve hip stability.  STATUS:  Progressing  STG 2a: 4 weeks:  The patient will demonstrate 4 /5 strength for bilateral hip flexion, abduction, and extension.  STATUS:  Progressing  STG2b:  4 weeks:  The patient will be independent with home exercises.     STATUS: Progressing  TREATMENT: Therapeutic exercises, manual therapy, aquatic therapy, home exercise instruction, and modalities as needed for pain to include:  electrical stimulation, moist heat, ice, ultrasound, and diathermy.      3. The patient has gait dysfunction.  LTG 3: 12 weeks:  The patient will ambulate without assistive device, independently, for community distances with minimal limp to the left lower extremity in order to improve mobility and allow patient to perform activities such as grocery shopping with greater ease.  STATUS:  Progressing  TREATMENT: Gait training, aquatic therapy, therapeutic exercise, and home exercise instruction.      4. Mobility: Walking/Moving Around Functional Limitation    LTG 4: 12 weeks:  The patient will demonstrate limitation by achieving a score of 11/45 on the CLAUDIO.  STATUS:  Progressing  STG 4 a: 4 weeks:  The patient will demonstrate limitation by achieving a score of 15/45 on the CLAUDIO.    STATUS:  Progressing  TREATMENT:  Manual therapy, therapeutic exercise, home exercise instruction, and modalities as needed.      Plan  Therapy options: will be seen for skilled therapy services  Planned modality interventions: cryotherapy, thermotherapy (hydrocollator packs), dry needling, TENS and ultrasound  Other planned modality interventions: NO TRACTION - fusion  Planned therapy interventions: abdominal trunk stabilization, manual therapy, flexibility, functional ROM  exercises, gait training, home exercise program, therapeutic activities, stretching, strengthening, spinal/joint mobilization, soft tissue mobilization, postural training, neuromuscular re-education, body mechanics training, ADL retraining, balance/weight-bearing training and motor coordination training  Frequency: 1x week  Duration in weeks: 12  Treatment plan discussed with: patient  Plan details: Continue with lower back/core strength, trunk control/postural awareness, improved ROM/flexibility        Progress per Plan of Care and Progress strengthening /stabilization /functional activity           Timed:  Manual Therapy:         mins  62642;  Therapeutic Exercise:    13     mins  52513;     Neuromuscular Felipe:        mins  01588;    Therapeutic Activity:     10     mins  25995;     Gait Training:           mins  46996;     Ultrasound:          mins  90160;    Canalith Repos           ___  mins  65192      Untimed:  Electrical Stimulation:         mins  54465 ( );  Mechanical Traction:         mins  46961;   Dry Needling:                     mins self pay       Timed Treatment:   23   mins   Total Treatment:     28   mins      Karla Patel PT, DPT  [place] License #: [id number]

## 2023-06-14 ENCOUNTER — OFFICE VISIT (OUTPATIENT)
Dept: FAMILY MEDICINE CLINIC | Age: 73
End: 2023-06-14
Payer: MEDICARE

## 2023-06-14 VITALS
SYSTOLIC BLOOD PRESSURE: 106 MMHG | OXYGEN SATURATION: 95 % | HEIGHT: 64 IN | WEIGHT: 152 LBS | BODY MASS INDEX: 25.95 KG/M2 | HEART RATE: 81 BPM | DIASTOLIC BLOOD PRESSURE: 67 MMHG

## 2023-06-14 DIAGNOSIS — R42 DIZZINESS: Primary | ICD-10-CM

## 2023-06-14 PROCEDURE — 1160F RVW MEDS BY RX/DR IN RCRD: CPT | Performed by: FAMILY MEDICINE

## 2023-06-14 PROCEDURE — 3078F DIAST BP <80 MM HG: CPT | Performed by: FAMILY MEDICINE

## 2023-06-14 PROCEDURE — 3074F SYST BP LT 130 MM HG: CPT | Performed by: FAMILY MEDICINE

## 2023-06-14 PROCEDURE — 99213 OFFICE O/P EST LOW 20 MIN: CPT | Performed by: FAMILY MEDICINE

## 2023-06-14 PROCEDURE — 1159F MED LIST DOCD IN RCRD: CPT | Performed by: FAMILY MEDICINE

## 2023-06-14 NOTE — ASSESSMENT & PLAN NOTE
Slowly improving.  She does still have some residual fluid, particularly in the right ear.  Stay the course with daily use of Flonase.  She may continue to use the scopolamine patches to help with the symptoms of motion sickness/dizziness in the meantime but I would like her to periodically try going off of it to see if she still needs them.  I will see her back in 8 weeks as scheduled on 8/11/2023 or sooner as needed should symptoms worsen again or fail to continue resolving.  At that time, we might see about getting her in with an allergist for further evaluation.

## 2023-06-14 NOTE — PROGRESS NOTES
"Chief Complaint  Dizziness (2 week f/u)    Subjective          Luisa Padilla presents to [facility] today to follow up on complaint of dizziness.    She is back after using the Flonase fairly regularly for the last 2 weeks.  I did send in some scopolamine patches and refill on meclizine at last visit for her to use symptomatically while we waited for the remainder of her middle ear fluid to drain.  She does feel somewhat better although the dizziness can still grab her if she bends over or gets into the wrong position.  She is using her Flonase pretty much every day.  She can function much better than she was previously, although she is still relying on the scopolamine patches basically daily.      HPI 5/26/2023:  Sx started Saturday morning and was getting ready for the day.  She felt like she was going \"sideways\" across the floor.  She describes her dizziness as \"car sickness.\"  Not lightheadedness, vertigo/room spinning or imbalance/unsteadiness.  Watching screens with movement make it worse.  She feels okay when she first wakes up, but after she stands up for a few minutes, the dizziness kicks in and lasts all day.  Moving her head from side to side does bring it on.  Lying down in bed and rolling over does NOT.  She has been seeing Physical Therapy for vertigo with Epley maneuvers, but despite 2 treatments now, she is still no better.  She felt like the therapy was making her worse.  She went to the chiropractor yesterday and she does feel a little bit better today.  She had one episode of headache, like a band around the head with pain above that.  \"The worst headache I've ever had.\"  It lasted only a couple minutes, after undergoing an Epley maneuver.  Then it just subsided on its own.  No photophobia, no phonophobia.          Current Outpatient Medications:     atorvastatin (LIPITOR) 40 MG tablet, TAKE 1 TABLET EVERY DAY, Disp: 90 tablet, Rfl: 1    carvedilol (COREG) " 3.125 MG tablet, TAKE 1 TABLET EVERY NIGHT, Disp: 90 tablet, Rfl: 1    cyclobenzaprine (FLEXERIL) 10 MG tablet, Take 1 tablet by mouth At Night As Needed for Muscle Spasms., Disp: 30 tablet, Rfl: 1    Diclofenac Sodium (VOLTAREN) 1 % gel gel, APPLY 4 GM TOPICALLY TO THE APPROPRIATE AREA AS DIRECTED 4 TIMES A DAY AS NEEDED (ARTHRITIS) FOR UP TO 30 DAYS., Disp: 100 g, Rfl: 2    famotidine (PEPCID) 40 MG tablet, Take 1 tablet by mouth Daily., Disp: 90 tablet, Rfl: 0    fluticasone (FLONASE) 50 MCG/ACT nasal spray, 2 sprays into the nostril(s) as directed by provider 2 (Two) Times a Day., Disp: 16 g, Rfl: 0    gabapentin (NEURONTIN) 100 MG capsule, TAKE 2 TO 3 CAPSULES 3 TIMES DAILY AS NEEDED, Disp: 270 capsule, Rfl: 2    guaiFENesin (MUCINEX) 600 MG 12 hr tablet, Take 2 tablets by mouth As Needed., Disp: , Rfl:     hydrOXYzine (ATARAX) 25 MG tablet, TAKE 1/2 TO 1 TABLET EVERY 6 HOURS DURING THE DAY AND 1 TO 2 TABLETS EVERY NIGHT AS NEEDED FOR ANXIETY, Disp: 180 tablet, Rfl: 1    levothyroxine (SYNTHROID, LEVOTHROID) 50 MCG tablet, TAKE 1 TABLET EVERY DAY, Disp: 90 tablet, Rfl: 1    meclizine (ANTIVERT) 25 MG tablet, Take 1 tablet by mouth 3 (Three) Times a Day As Needed for Dizziness., Disp: 30 tablet, Rfl: 0    meloxicam (MOBIC) 15 MG tablet, TAKE 1 TABLET BY MOUTH DAILY AS NEEDED FOR MODERATE PAIN, Disp: 90 tablet, Rfl: 1    omeprazole (priLOSEC) 40 MG capsule, TAKE 1 CAPSULE EVERY DAY, Disp: 90 capsule, Rfl: 1    ondansetron (ZOFRAN) 4 MG tablet, TAKE 1 TABLET TWICE DAILY AS NEEDED FOR NAUSEA AND VOMITING, Disp: 60 tablet, Rfl: 0    Scopolamine 1 MG/3DAYS patch, Place 1 patch on the skin as directed by provider Every 72 (Seventy-Two) Hours., Disp: 5 patch, Rfl: 0    Allergies:  Codeine, Hydroxyzine, and Oxycodone-acetaminophen      Objective   Vital Signs:   Vitals:    06/14/23 1314   BP: 106/67   BP Location: Left arm   Patient Position: Sitting   Cuff Size: Adult   Pulse: 81   SpO2: 95%   Weight: 68.9 kg (152 lb)  "  Height: 163.8 cm (64.49\")       Physical Exam  Vitals reviewed.   Constitutional:       General: She is not in acute distress.     Appearance: Normal appearance. She is well-developed.   HENT:      Head: Normocephalic and atraumatic.      Right Ear: External ear normal. A middle ear effusion is present.      Left Ear: External ear normal. A middle ear effusion (further improved) is present.   Eyes:      Extraocular Movements: Extraocular movements intact.      Conjunctiva/sclera: Conjunctivae normal.      Pupils: Pupils are equal, round, and reactive to light.   Cardiovascular:      Heart sounds: No murmur heard.  Pulmonary:      Effort: Pulmonary effort is normal.   Abdominal:      Tenderness: There is no abdominal tenderness.   Musculoskeletal:         General: Normal range of motion.   Neurological:      Mental Status: She is alert.   Psychiatric:         Mood and Affect: Affect normal.         Lab Results   Component Value Date    GLUCOSE 113 (H) 02/02/2023    BUN 8 02/02/2023    CREATININE 1.10 (H) 02/02/2023    EGFRIFNONA 60 01/12/2022    EGFRIFAFRI 69 01/12/2022    BCR 7.3 02/02/2023    K 4.1 02/02/2023    CO2 26.0 02/02/2023    CALCIUM 9.7 02/02/2023    PROTENTOTREF 6.6 01/12/2022    ALBUMIN 4.2 02/02/2023    LABIL2 2.0 01/12/2022    AST 23 02/02/2023    ALT 20 02/02/2023       Lab Results   Component Value Date    CHOL 183 02/02/2023    CHLPL 165 01/12/2022    TRIG 140 02/02/2023    HDL 55 02/02/2023     (H) 02/02/2023            Assessment and Plan    Diagnoses and all orders for this visit:    1. Dizziness (Primary)  Assessment & Plan:  Slowly improving.  She does still have some residual fluid, particularly in the right ear.  Stay the course with daily use of Flonase.  She may continue to use the scopolamine patches to help with the symptoms of motion sickness/dizziness in the meantime but I would like her to periodically try going off of it to see if she still needs them.  I will see her back " in 8 weeks as scheduled on 8/11/2023 or sooner as needed should symptoms worsen again or fail to continue resolving.  At that time, we might see about getting her in with an allergist for further evaluation.          Follow Up   Return if symptoms worsen or fail to improve, for As scheduled 8/11/2023.  Patient was given instructions and counseling regarding her condition or for health maintenance advice. Please see specific information pulled into the AVS if appropriate.           05/26/2023

## 2023-06-15 ENCOUNTER — AMBULATORY SURGICAL CENTER (OUTPATIENT)
Dept: URBAN - METROPOLITAN AREA SURGERY 20 | Facility: SURGERY | Age: 73
End: 2023-06-15

## 2023-06-15 ENCOUNTER — OFFICE (OUTPATIENT)
Dept: URBAN - METROPOLITAN AREA PATHOLOGY 4 | Facility: PATHOLOGY | Age: 73
End: 2023-06-15

## 2023-06-15 VITALS — HEIGHT: 66 IN

## 2023-06-15 DIAGNOSIS — Z12.11 ENCOUNTER FOR SCREENING FOR MALIGNANT NEOPLASM OF COLON: ICD-10-CM

## 2023-06-15 DIAGNOSIS — K57.30 DIVERTICULOSIS OF LARGE INTESTINE WITHOUT PERFORATION OR ABS: ICD-10-CM

## 2023-06-15 DIAGNOSIS — K29.70 GASTRITIS, UNSPECIFIED, WITHOUT BLEEDING: ICD-10-CM

## 2023-06-15 DIAGNOSIS — K31.89 OTHER DISEASES OF STOMACH AND DUODENUM: ICD-10-CM

## 2023-06-15 DIAGNOSIS — K64.1 SECOND DEGREE HEMORRHOIDS: ICD-10-CM

## 2023-06-15 DIAGNOSIS — R11.0 NAUSEA: ICD-10-CM

## 2023-06-15 DIAGNOSIS — K30 FUNCTIONAL DYSPEPSIA: ICD-10-CM

## 2023-06-15 DIAGNOSIS — K21.9 GASTRO-ESOPHAGEAL REFLUX DISEASE WITHOUT ESOPHAGITIS: ICD-10-CM

## 2023-06-15 PROBLEM — K22.89 OTHER SPECIFIED DISEASE OF ESOPHAGUS: Status: ACTIVE | Noted: 2023-06-15

## 2023-06-15 LAB
GI HISTOLOGY: A. SELECT: (no result)
GI HISTOLOGY: B. SELECT: (no result)
GI HISTOLOGY: PDF REPORT: (no result)

## 2023-06-15 PROCEDURE — 88305 TISSUE EXAM BY PATHOLOGIST: CPT | Mod: 26 | Performed by: INTERNAL MEDICINE

## 2023-06-15 PROCEDURE — G0121 COLON CA SCRN NOT HI RSK IND: HCPCS | Performed by: INTERNAL MEDICINE

## 2023-06-15 PROCEDURE — 43239 EGD BIOPSY SINGLE/MULTIPLE: CPT | Performed by: INTERNAL MEDICINE

## 2023-06-20 ENCOUNTER — TELEPHONE (OUTPATIENT)
Dept: FAMILY MEDICINE CLINIC | Age: 73
End: 2023-06-20

## 2023-06-20 NOTE — TELEPHONE ENCOUNTER
Caller: Luisa Padilla    Relationship: Self    Best call back number: [phone]    What is the medical concern/diagnosis: VERTIGO     Any additional details: PATIENT STATES THAT SHE WOKE UP AND WAS VERY DIZZY AND HAVING NAUSEA.

## 2023-07-19 PROBLEM — H61.22 IMPACTED CERUMEN OF LEFT EAR: Status: ACTIVE | Noted: 2023-07-19

## 2023-07-19 PROBLEM — R26.89 IMBALANCE: Status: ACTIVE | Noted: 2023-07-19

## 2023-07-22 DIAGNOSIS — M54.40 CHRONIC BILATERAL LOW BACK PAIN WITH SCIATICA, SCIATICA LATERALITY UNSPECIFIED: ICD-10-CM

## 2023-07-22 DIAGNOSIS — G89.29 CHRONIC BILATERAL LOW BACK PAIN WITH SCIATICA, SCIATICA LATERALITY UNSPECIFIED: ICD-10-CM

## 2023-07-25 ENCOUNTER — HOSPITAL ENCOUNTER (OUTPATIENT)
Dept: CT IMAGING | Facility: HOSPITAL | Age: 73
Discharge: HOME | End: 2023-07-25
Admitting: INTERNAL MEDICINE
Payer: MEDICARE

## 2023-07-25 DIAGNOSIS — K21.9 GASTROESOPHAGEAL REFLUX DISEASE WITHOUT ESOPHAGITIS: ICD-10-CM

## 2023-07-25 DIAGNOSIS — R05.3 CHRONIC COUGH: ICD-10-CM

## 2023-07-25 DIAGNOSIS — R91.8 LUNG NODULES: ICD-10-CM

## 2023-07-25 PROCEDURE — 71271 CT THORAX LUNG CANCER SCR C-: CPT

## 2023-07-25 RX ORDER — FAMOTIDINE 40 MG/1
40 TABLET, FILM COATED ORAL 2 TIMES DAILY
Qty: 180 TABLET | Refills: 1 | Status: SHIPPED | OUTPATIENT
Start: 2023-07-25

## 2023-07-25 RX ORDER — ONDANSETRON 4 MG/1
4 TABLET, FILM COATED ORAL EVERY 12 HOURS PRN
Qty: 60 TABLET | Refills: 0 | Status: SHIPPED | OUTPATIENT
Start: 2023-07-25

## 2023-07-25 RX ORDER — GABAPENTIN 100 MG/1
CAPSULE ORAL
Qty: 270 CAPSULE | Refills: 2 | Status: SHIPPED | OUTPATIENT
Start: 2023-07-25

## 2023-08-01 ENCOUNTER — OFFICE VISIT (OUTPATIENT)
Dept: PULMONOLOGY | Facility: CLINIC | Age: 73
End: 2023-08-01
Payer: MEDICARE

## 2023-08-01 VITALS
SYSTOLIC BLOOD PRESSURE: 129 MMHG | TEMPERATURE: 98.3 F | HEART RATE: 76 BPM | HEIGHT: 64 IN | RESPIRATION RATE: 16 BRPM | OXYGEN SATURATION: 97 % | WEIGHT: 150 LBS | BODY MASS INDEX: 25.61 KG/M2 | DIASTOLIC BLOOD PRESSURE: 82 MMHG

## 2023-08-01 DIAGNOSIS — R91.8 LUNG NODULES: Primary | ICD-10-CM

## 2023-08-01 DIAGNOSIS — U09.9 LONG COVID: ICD-10-CM

## 2023-08-01 DIAGNOSIS — R05.3 CHRONIC COUGH: ICD-10-CM

## 2023-08-01 PROCEDURE — 1159F MED LIST DOCD IN RCRD: CPT | Performed by: INTERNAL MEDICINE

## 2023-08-01 PROCEDURE — 1160F RVW MEDS BY RX/DR IN RCRD: CPT | Performed by: INTERNAL MEDICINE

## 2023-08-01 PROCEDURE — 3074F SYST BP LT 130 MM HG: CPT | Performed by: INTERNAL MEDICINE

## 2023-08-01 PROCEDURE — 99214 OFFICE O/P EST MOD 30 MIN: CPT | Performed by: INTERNAL MEDICINE

## 2023-08-01 PROCEDURE — 3079F DIAST BP 80-89 MM HG: CPT | Performed by: INTERNAL MEDICINE

## 2023-08-11 ENCOUNTER — OFFICE VISIT (OUTPATIENT)
Dept: FAMILY MEDICINE CLINIC | Age: 73
End: 2023-08-11
Payer: MEDICARE

## 2023-08-11 VITALS
HEART RATE: 82 BPM | DIASTOLIC BLOOD PRESSURE: 82 MMHG | OXYGEN SATURATION: 95 % | WEIGHT: 150.6 LBS | SYSTOLIC BLOOD PRESSURE: 121 MMHG | HEIGHT: 64 IN | BODY MASS INDEX: 25.71 KG/M2

## 2023-08-11 DIAGNOSIS — F33.1 MAJOR DEPRESSIVE DISORDER, RECURRENT EPISODE, MODERATE DEGREE: ICD-10-CM

## 2023-08-11 DIAGNOSIS — M54.42 CHRONIC BILATERAL LOW BACK PAIN WITH BILATERAL SCIATICA: ICD-10-CM

## 2023-08-11 DIAGNOSIS — K21.9 GASTROESOPHAGEAL REFLUX DISEASE WITHOUT ESOPHAGITIS: ICD-10-CM

## 2023-08-11 DIAGNOSIS — E03.9 ACQUIRED HYPOTHYROIDISM: ICD-10-CM

## 2023-08-11 DIAGNOSIS — R42 VERTIGO: Primary | ICD-10-CM

## 2023-08-11 DIAGNOSIS — G89.29 CHRONIC BILATERAL LOW BACK PAIN WITH BILATERAL SCIATICA: ICD-10-CM

## 2023-08-11 DIAGNOSIS — E78.2 MIXED HYPERLIPIDEMIA: ICD-10-CM

## 2023-08-11 DIAGNOSIS — I10 ESSENTIAL HYPERTENSION: ICD-10-CM

## 2023-08-11 DIAGNOSIS — M54.41 CHRONIC BILATERAL LOW BACK PAIN WITH BILATERAL SCIATICA: ICD-10-CM

## 2023-08-11 PROBLEM — R26.89 IMBALANCE: Status: RESOLVED | Noted: 2023-07-19 | Resolved: 2023-08-11

## 2023-08-11 PROBLEM — H61.22 IMPACTED CERUMEN OF LEFT EAR: Status: RESOLVED | Noted: 2023-07-19 | Resolved: 2023-08-11

## 2023-08-11 PROBLEM — F41.1 GENERALIZED ANXIETY DISORDER: Status: ACTIVE | Noted: 2021-08-20

## 2023-08-11 PROCEDURE — 3079F DIAST BP 80-89 MM HG: CPT | Performed by: FAMILY MEDICINE

## 2023-08-11 PROCEDURE — 99214 OFFICE O/P EST MOD 30 MIN: CPT | Performed by: FAMILY MEDICINE

## 2023-08-11 PROCEDURE — 3074F SYST BP LT 130 MM HG: CPT | Performed by: FAMILY MEDICINE

## 2023-08-11 RX ORDER — PAROXETINE HYDROCHLORIDE 20 MG/1
20 TABLET, FILM COATED ORAL EVERY MORNING
Qty: 90 TABLET | Refills: 0 | Status: SHIPPED | OUTPATIENT
Start: 2023-08-11

## 2023-08-11 RX ORDER — ATORVASTATIN CALCIUM 40 MG/1
40 TABLET, FILM COATED ORAL DAILY
Qty: 90 TABLET | Refills: 1 | Status: SHIPPED | OUTPATIENT
Start: 2023-08-11

## 2023-08-11 RX ORDER — LEVOTHYROXINE SODIUM 0.05 MG/1
50 TABLET ORAL DAILY
Qty: 90 TABLET | Refills: 1 | Status: SHIPPED | OUTPATIENT
Start: 2023-08-11

## 2023-08-11 NOTE — ASSESSMENT & PLAN NOTE
Stable on levothyroxine 50 mcg daily.  Refills were not needed today.  Labs were not due today.  Continue to monitor.

## 2023-08-11 NOTE — ASSESSMENT & PLAN NOTE
Stable on atorvastatin 40 mg daily.  Refills were not needed today.  Labs were not due today.  Continue to monitor.

## 2023-08-11 NOTE — ASSESSMENT & PLAN NOTE
Stable on current regimen.  Symptoms are well controlled.  No adverse effects. She does require ongoing use of this controlled substance to function.  Tox screen was not due today.  Prior tox screen appropriate.  JOVANA was run today.  Refills were not needed today.  RTC 3 months.

## 2023-08-11 NOTE — ASSESSMENT & PLAN NOTE
Stable on omeprazole 40 mg daily.  Refills were not needed today.  Continue to monitor.  Wean PPI as able.

## 2023-08-11 NOTE — ASSESSMENT & PLAN NOTE
No longer requiring antihypertensives.  She is off the carvedilol and blood pressure has been extremely well-controlled.  Continue to monitor.

## 2023-08-11 NOTE — ASSESSMENT & PLAN NOTE
She continues on paroxetine 30 mg daily for now.  Interested in continuing the taper as she would like to get down off this.  Prescription sent for paroxetine 20 mg and we will continue her at that for the next 3 months, after which we will drop down to 10 mg daily and then she can discontinue at her desire.

## 2023-08-11 NOTE — PROGRESS NOTES
Chief Complaint  Hypertension (3 month f/u)    Subjective          Luisa Padilla presents to [facility] today to follow up on complaint of dizziness.  Her  Martin accompanies her today.    We have been following her for dizziness/vertigo for the past 3 months or so.  Symptoms did not improve with Epley maneuvers or allergy treatment, so she was sent to ENT for further evaluation.  Dr. Garland saw her on 7/19/2023 and balance testing was normal at that time.  She was not complaining of any symptoms of dizziness or vertigo when she was seen by him.  She did have an ear washout at that time and was given a list of balance exercises to complete at home. She is feeling quite good!    She is on gabapentin for chronic low back pain with bilateral sciatica, L>R.  Symptoms are fairly well-controlled.  She does have some Flexeril that she keeps on hand to use as needed.  Also takes meloxicam for generalized OA.  No adverse effects.  She uses only 1-2 tabs of the gabapentin typically as higher doses have been sedating for her.  Tox screen is UTD, last done 5/2023.  She has used diclofenac and methocarbamol in the past.  She is following with Dr. Smith at [facility] for Synvisc injections in the bilateral knees.       She has been on carvedilol for HTN but this was discontinued due to hypotension.  Her blood pressure has been well controlled despite being off the antihypertensive.  No chest pain, shortness of breath, or palpitations.       She is on atorvastation for HLD.  No myalgias.      She is on levothyroxine for hypothyroidism.  No heat/cold intolerance, changes in hair or skin.      She is on paroxetine for depression.  However, she feels good and would like to get off any meds that she can.  Interested in continuing her taper.      She is on omeprazole for GERD.  Sx are generally well controlled.  No problems recently.  She has prn Zofran in case nausea flares up.   "She follows with Dr. Boo LAWSON.        Current Outpatient Medications:     atorvastatin (LIPITOR) 40 MG tablet, Take 1 tablet by mouth Daily., Disp: 90 tablet, Rfl: 1    cyclobenzaprine (FLEXERIL) 10 MG tablet, Take 1 tablet by mouth At Night As Needed for Muscle Spasms., Disp: 30 tablet, Rfl: 1    Diclofenac Sodium (VOLTAREN) 1 % gel gel, APPLY 4 GM TOPICALLY TO THE APPROPRIATE AREA AS DIRECTED 4 TIMES A DAY AS NEEDED (ARTHRITIS) FOR UP TO 30 DAYS., Disp: 100 g, Rfl: 2    famotidine (PEPCID) 40 MG tablet, Take 1 tablet by mouth 2 (Two) Times a Day., Disp: 180 tablet, Rfl: 1    fluticasone (FLONASE) 50 MCG/ACT nasal spray, 2 sprays into the nostril(s) as directed by provider 2 (Two) Times a Day., Disp: 16 g, Rfl: 0    gabapentin (NEURONTIN) 100 MG capsule, TAKE 2 TO 3 CAPSULES 3 TIMES DAILY AS NEEDED, Disp: 270 capsule, Rfl: 2    levothyroxine (SYNTHROID, LEVOTHROID) 50 MCG tablet, Take 1 tablet by mouth Daily., Disp: 90 tablet, Rfl: 1    meloxicam (MOBIC) 15 MG tablet, TAKE 1 TABLET BY MOUTH DAILY AS NEEDED FOR MODERATE PAIN, Disp: 90 tablet, Rfl: 1    omeprazole (priLOSEC) 40 MG capsule, TAKE 1 CAPSULE EVERY DAY, Disp: 90 capsule, Rfl: 1    ondansetron (ZOFRAN) 4 MG tablet, Take 1 tablet by mouth Every 12 (Twelve) Hours As Needed for Nausea or Vomiting., Disp: 60 tablet, Rfl: 0    PARoxetine (PAXIL) 20 MG tablet, Take 1 tablet by mouth Every Morning., Disp: 90 tablet, Rfl: 0    Allergies:  Codeine, Hydroxyzine, and Oxycodone-acetaminophen      Objective   Vital Signs:   Vitals:    08/11/23 0912   BP: 121/82   BP Location: Left arm   Patient Position: Sitting   Cuff Size: Adult   Pulse: 82   SpO2: 95%   Weight: 68.3 kg (150 lb 9.6 oz)   Height: 162.6 cm (64.02\")         Physical Exam  Vitals reviewed.   Constitutional:       General: She is not in acute distress.     Appearance: Normal appearance. She is well-developed.   HENT:      Head: Normocephalic and atraumatic.      Right Ear: External ear normal.      Left " Ear: External ear normal.   Eyes:      Extraocular Movements: Extraocular movements intact.      Conjunctiva/sclera: Conjunctivae normal.      Pupils: Pupils are equal, round, and reactive to light.   Cardiovascular:      Rate and Rhythm: Normal rate and regular rhythm.      Heart sounds: No murmur heard.  Pulmonary:      Effort: Pulmonary effort is normal.      Breath sounds: Normal breath sounds. No wheezing, rhonchi or rales.   Abdominal:      General: Bowel sounds are normal. There is no distension.      Palpations: Abdomen is soft.      Tenderness: There is no abdominal tenderness.   Musculoskeletal:         General: Normal range of motion.   Neurological:      Mental Status: She is alert.   Psychiatric:         Mood and Affect: Affect normal.         Lab Results   Component Value Date    GLUCOSE 113 (H) 02/02/2023    BUN 8 02/02/2023    CREATININE 1.10 (H) 02/02/2023    EGFRIFNONA 60 01/12/2022    EGFRIFAFRI 69 01/12/2022    BCR 7.3 02/02/2023    K 4.1 02/02/2023    CO2 26.0 02/02/2023    CALCIUM 9.7 02/02/2023    PROTENTOTREF 6.6 01/12/2022    ALBUMIN 4.2 02/02/2023    LABIL2 2.0 01/12/2022    AST 23 02/02/2023    ALT 20 02/02/2023       Lab Results   Component Value Date    CHOL 183 02/02/2023    CHLPL 165 01/12/2022    TRIG 140 02/02/2023    HDL 55 02/02/2023     (H) 02/02/2023            Assessment and Plan    Diagnoses and all orders for this visit:    1. Vertigo (Primary)  Assessment & Plan:  Resolved.      2. Chronic bilateral low back pain with bilateral sciatica  Assessment & Plan:  Stable on current regimen.  Symptoms are well controlled.  No adverse effects. She does require ongoing use of this controlled substance to function.  Tox screen was not due today.  Prior tox screen appropriate.  JOVANA was run today.  Refills were not needed today.  RTC 3 months.        3. Essential hypertension  Assessment & Plan:  No longer requiring antihypertensives.  She is off the carvedilol and blood  pressure has been extremely well-controlled.  Continue to monitor.      4. Mixed hyperlipidemia  Assessment & Plan:  Stable on atorvastatin 40 mg daily.  Refills were not needed today.  Labs were not due today.  Continue to monitor.      Orders:  -     atorvastatin (LIPITOR) 40 MG tablet; Take 1 tablet by mouth Daily.  Dispense: 90 tablet; Refill: 1    5. Acquired hypothyroidism  Assessment & Plan:  Stable on levothyroxine 50 mcg daily.  Refills were not needed today.  Labs were not due today.  Continue to monitor.      Orders:  -     levothyroxine (SYNTHROID, LEVOTHROID) 50 MCG tablet; Take 1 tablet by mouth Daily.  Dispense: 90 tablet; Refill: 1    6. Major depressive disorder, recurrent episode, moderate degree  Assessment & Plan:  She continues on paroxetine 30 mg daily for now.  Interested in continuing the taper as she would like to get down off this.  Prescription sent for paroxetine 20 mg and we will continue her at that for the next 3 months, after which we will drop down to 10 mg daily and then she can discontinue at her desire.    Orders:  -     PARoxetine (PAXIL) 20 MG tablet; Take 1 tablet by mouth Every Morning.  Dispense: 90 tablet; Refill: 0    7. Gastroesophageal reflux disease without esophagitis  Assessment & Plan:  Stable on omeprazole 40 mg daily.  Refills were not needed today.  Continue to monitor.  Wean PPI as able.            Follow Up   Return in about 3 months (around 11/11/2023) for Recheck.  Patient was given instructions and counseling regarding her condition or for health maintenance advice. Please see specific information pulled into the AVS if appropriate.           05/26/2023

## 2023-08-18 ENCOUNTER — OFFICE VISIT (OUTPATIENT)
Dept: FAMILY MEDICINE CLINIC | Age: 73
End: 2023-08-18
Payer: MEDICARE

## 2023-08-18 VITALS
OXYGEN SATURATION: 97 % | BODY MASS INDEX: 25.44 KG/M2 | HEIGHT: 64 IN | DIASTOLIC BLOOD PRESSURE: 68 MMHG | WEIGHT: 149 LBS | HEART RATE: 84 BPM | SYSTOLIC BLOOD PRESSURE: 121 MMHG

## 2023-08-18 DIAGNOSIS — E03.9 ACQUIRED HYPOTHYROIDISM: ICD-10-CM

## 2023-08-18 DIAGNOSIS — M19.09 PRIMARY OSTEOARTHRITIS, OTHER SPECIFIED SITE: ICD-10-CM

## 2023-08-18 DIAGNOSIS — Z01.818 PREOPERATIVE CLEARANCE: Primary | ICD-10-CM

## 2023-08-18 DIAGNOSIS — E78.2 MIXED HYPERLIPIDEMIA: ICD-10-CM

## 2023-08-18 DIAGNOSIS — M17.12 PRIMARY OSTEOARTHRITIS OF LEFT KNEE: ICD-10-CM

## 2023-08-18 DIAGNOSIS — I10 ESSENTIAL HYPERTENSION: ICD-10-CM

## 2023-08-18 DIAGNOSIS — Z83.2 FAMILY HISTORY OF FACTOR V LEIDEN MUTATION: ICD-10-CM

## 2023-08-18 PROBLEM — G47.33 OBSTRUCTIVE SLEEP APNEA: Status: RESOLVED | Noted: 2021-08-20 | Resolved: 2023-08-18

## 2023-08-18 PROCEDURE — 99214 OFFICE O/P EST MOD 30 MIN: CPT | Performed by: FAMILY MEDICINE

## 2023-08-18 PROCEDURE — 3078F DIAST BP <80 MM HG: CPT | Performed by: FAMILY MEDICINE

## 2023-08-18 PROCEDURE — 3074F SYST BP LT 130 MM HG: CPT | Performed by: FAMILY MEDICINE

## 2023-08-18 NOTE — Clinical Note
Please fax OV note along with EKG clipped to paperwork from Dr. Smith's office (in my outbox) for her preop clearance.  Thanks, CHRISTEN

## 2023-08-18 NOTE — PROGRESS NOTES
Chief Complaint  Surgical Clearance    Subjective          Luisa Padilla presents to [facility] today for preop evaluation.    She is scheduled to go for L total knee replacement with Dr. Smith 11/9/2023.  Preop labs and EKG have not yet been done.    She has undergone surgery with general anesthesia in the past and did not have problems with general anesthesia.  She can walk up a flight of stairs without chest pain or shortness of breath.  She does not have personal history of blood clots.  She does have family history of factor V Leiden but she has been tested and came back negative.  Significant medical comorbidities include: Hypertension (diet-controlled), hyperlipidemia (on statin), hypothyroidism (well-controlled on levothyroxine).        Current Outpatient Medications:     atorvastatin (LIPITOR) 40 MG tablet, Take 1 tablet by mouth Daily., Disp: 90 tablet, Rfl: 1    cyclobenzaprine (FLEXERIL) 10 MG tablet, Take 1 tablet by mouth At Night As Needed for Muscle Spasms., Disp: 30 tablet, Rfl: 1    Diclofenac Sodium (VOLTAREN) 1 % gel gel, APPLY 4 GM TOPICALLY TO THE APPROPRIATE AREA AS DIRECTED 4 TIMES A DAY AS NEEDED (ARTHRITIS) FOR UP TO 30 DAYS., Disp: 100 g, Rfl: 2    famotidine (PEPCID) 40 MG tablet, Take 1 tablet by mouth 2 (Two) Times a Day., Disp: 180 tablet, Rfl: 1    fluticasone (FLONASE) 50 MCG/ACT nasal spray, 2 sprays into the nostril(s) as directed by provider 2 (Two) Times a Day., Disp: 16 g, Rfl: 0    gabapentin (NEURONTIN) 100 MG capsule, TAKE 2 TO 3 CAPSULES 3 TIMES DAILY AS NEEDED, Disp: 270 capsule, Rfl: 2    levothyroxine (SYNTHROID, LEVOTHROID) 50 MCG tablet, Take 1 tablet by mouth Daily., Disp: 90 tablet, Rfl: 1    meloxicam (MOBIC) 15 MG tablet, TAKE 1 TABLET BY MOUTH DAILY AS NEEDED FOR MODERATE PAIN, Disp: 90 tablet, Rfl: 1    omeprazole (priLOSEC) 40 MG capsule, TAKE 1 CAPSULE EVERY DAY, Disp: 90 capsule, Rfl: 1    ondansetron (ZOFRAN) 4 MG  "tablet, Take 1 tablet by mouth Every 12 (Twelve) Hours As Needed for Nausea or Vomiting., Disp: 60 tablet, Rfl: 0    PARoxetine (PAXIL) 20 MG tablet, Take 1 tablet by mouth Every Morning., Disp: 90 tablet, Rfl: 0    neomycin-polymyxin-hydrocortisone (CORTISPORIN) 3.5-61616-2 otic solution, Administer 3 drops into the left ear 3 (Three) Times a Day., Disp: 10 mL, Rfl: 0    ondansetron ODT (ZOFRAN-ODT) 4 MG disintegrating tablet, DISSOLVE 1 TABLET IN MOUTH EVERY 8 HOURS AS NEEDED FOR NAUSEA OR VOMITING, Disp: , Rfl:     Scopolamine 1 MG/3DAYS patch, PLACE ONE PATCH ON THE SKIN EVERY 72 HOURS AS DIRECTED BY THE PROVIDER, Disp: , Rfl:     Allergies:  Codeine, Hydroxyzine, Hydrocodone, and Oxycodone-acetaminophen      Objective   Vital Signs:   Vitals:    08/18/23 1421   BP: 121/68   BP Location: Left arm   Patient Position: Sitting   Cuff Size: Adult   Pulse: 84   SpO2: 97%   Weight: 67.6 kg (149 lb)   Height: 162.6 cm (64.02\")       Physical Exam  Vitals reviewed.   Constitutional:       General: She is not in acute distress.     Appearance: Normal appearance. She is well-developed.   HENT:      Head: Normocephalic and atraumatic.      Right Ear: External ear normal.      Left Ear: External ear normal.   Eyes:      Extraocular Movements: Extraocular movements intact.      Conjunctiva/sclera: Conjunctivae normal.      Pupils: Pupils are equal, round, and reactive to light.   Cardiovascular:      Rate and Rhythm: Normal rate and regular rhythm.      Heart sounds: No murmur heard.  Pulmonary:      Effort: Pulmonary effort is normal.      Breath sounds: Normal breath sounds. No wheezing, rhonchi or rales.   Abdominal:      General: Bowel sounds are normal. There is no distension.      Palpations: Abdomen is soft.      Tenderness: There is no abdominal tenderness.   Musculoskeletal:         General: Normal range of motion.   Neurological:      Mental Status: She is alert.   Psychiatric:         Mood and Affect: Affect " normal.         Lab Results   Component Value Date    GLUCOSE 90 08/22/2023    BUN 17 08/22/2023    CREATININE 1.08 (H) 08/22/2023    EGFRIFNONA 60 01/12/2022    EGFRIFAFRI 69 01/12/2022    BCR 15.7 08/22/2023    K 4.2 08/22/2023    CO2 23.8 08/22/2023    CALCIUM 9.4 08/22/2023    PROTENTOTREF 6.6 01/12/2022    ALBUMIN 4.2 08/22/2023    LABIL2 2.0 01/12/2022    AST 23 08/22/2023    ALT 16 08/22/2023       Lab Results   Component Value Date    CHOL 146 08/22/2023    CHLPL 165 01/12/2022    TRIG 225 (H) 08/22/2023    HDL 42 08/22/2023    LDL 67 08/22/2023            Assessment and Plan    Diagnoses and all orders for this visit:    1. Preoperative clearance (Primary)  Assessment & Plan:  She is scheduled to go for L total knee replacement with Dr. Smith 11/9/2023.  Preop labs and EKG have not yet been done; ordered to be done next Tuesday when she comes in.    She has undergone surgery with general anesthesia in the past and did not have problems with general anesthesia.  She can walk up a flight of stairs without chest pain or shortness of breath.  She does not have personal history of blood clots.  She does have family history of factor V Leiden but she has been tested and came back negative.  Significant medical comorbidities include: Hypertension (diet-controlled), hyperlipidemia (on statin), hypothyroidism (well-controlled on levothyroxine).    Preop labs and EKG ordered to be completed on Tuesday of next week due to a prior obligation this afternoon which leads her to be unable to stay for the testing.  Pending normal labs and EKG, she will be cleared to proceed with her knee replacement.  We will contact Dr. Smith's office once those studies return.    ADDENDUM 8/23/23:  EKG done yesterday is normal.  Labs show mild anemia (11.6), elevated TG and stable mild CKD (Cr 1.07).  OK to proceed.    Orders:  -     Lipid Panel; Future  -     Comprehensive Metabolic Panel; Future  -     CBC Auto Differential;  Future  -     TSH; Future  -     Protime-INR; Future  -     Cancel: ECG 12 Lead  -     Cancel: ECG 12 Lead; Future    2. Primary osteoarthritis of left knee  Assessment & Plan:  As above.      3. Essential hypertension  Overview:  Diet controlled.    Orders:  -     Lipid Panel; Future  -     Comprehensive Metabolic Panel; Future  -     CBC Auto Differential; Future  -     TSH; Future  -     Cancel: ECG 12 Lead  -     Cancel: ECG 12 Lead; Future    4. Family history of factor V Leiden mutation  Overview:  She has been tested and was negative.    Orders:  -     Protime-INR; Future    5. Mixed hyperlipidemia  Assessment & Plan:  Well-controlled on atorvastatin.      6. Acquired hypothyroidism  Assessment & Plan:  Well-controlled on levothyroxine.      7. Primary osteoarthritis, other specified site  -     Protime-INR; Future        Follow Up   No follow-ups on file.  Patient was given instructions and counseling regarding her condition or for health maintenance advice. Please see specific information pulled into the AVS if appropriate.           08/18/2023

## 2023-08-18 NOTE — ASSESSMENT & PLAN NOTE
She is scheduled to go for L total knee replacement with Dr. Smith 11/9/2023.  Preop labs and EKG have not yet been done; ordered to be done next Tuesday when she comes in.    She has undergone surgery with general anesthesia in the past and did not have problems with general anesthesia.  She can walk up a flight of stairs without chest pain or shortness of breath.  She does not have personal history of blood clots.  She does have family history of factor V Leiden but she has been tested and came back negative.  Significant medical comorbidities include: Hypertension (diet-controlled), hyperlipidemia (on statin), hypothyroidism (well-controlled on levothyroxine).    Preop labs and EKG ordered to be completed on Tuesday of next week due to a prior obligation this afternoon which leads her to be unable to stay for the testing.  Pending normal labs and EKG, she will be cleared to proceed with her knee replacement.  We will contact Dr. Smith's office once those studies return.    ADDENDUM 8/23/23:  EKG done yesterday is normal.  Labs show mild anemia (11.6), elevated TG and stable mild CKD (Cr 1.07).  OK to proceed.

## 2023-08-22 ENCOUNTER — CLINICAL SUPPORT (OUTPATIENT)
Dept: FAMILY MEDICINE CLINIC | Age: 73
End: 2023-08-22
Payer: MEDICARE

## 2023-08-22 ENCOUNTER — LAB (OUTPATIENT)
Dept: LAB | Facility: HOSPITAL | Age: 73
End: 2023-08-22
Payer: MEDICARE

## 2023-08-22 DIAGNOSIS — Z83.2 FAMILY HISTORY OF FACTOR V LEIDEN MUTATION: ICD-10-CM

## 2023-08-22 DIAGNOSIS — Z01.818 PREOPERATIVE CLEARANCE: ICD-10-CM

## 2023-08-22 DIAGNOSIS — I10 ESSENTIAL HYPERTENSION: ICD-10-CM

## 2023-08-22 DIAGNOSIS — Z01.818 PREOPERATIVE CLEARANCE: Primary | ICD-10-CM

## 2023-08-22 DIAGNOSIS — M19.09 PRIMARY OSTEOARTHRITIS, OTHER SPECIFIED SITE: ICD-10-CM

## 2023-08-22 LAB
ALBUMIN SERPL-MCNC: 4.2 G/DL (ref 3.5–5.2)
ALBUMIN/GLOB SERPL: 1.7 G/DL
ALP SERPL-CCNC: 112 U/L (ref 39–117)
ALT SERPL W P-5'-P-CCNC: 16 U/L (ref 1–33)
ANION GAP SERPL CALCULATED.3IONS-SCNC: 10.2 MMOL/L (ref 5–15)
AST SERPL-CCNC: 23 U/L (ref 1–32)
BASOPHILS # BLD AUTO: 0.02 10*3/MM3 (ref 0–0.2)
BASOPHILS NFR BLD AUTO: 0.2 % (ref 0–1.5)
BILIRUB SERPL-MCNC: 0.3 MG/DL (ref 0–1.2)
BUN SERPL-MCNC: 17 MG/DL (ref 8–23)
BUN/CREAT SERPL: 15.7 (ref 7–25)
CALCIUM SPEC-SCNC: 9.4 MG/DL (ref 8.6–10.5)
CHLORIDE SERPL-SCNC: 107 MMOL/L (ref 98–107)
CHOLEST SERPL-MCNC: 146 MG/DL (ref 0–200)
CO2 SERPL-SCNC: 23.8 MMOL/L (ref 22–29)
CREAT SERPL-MCNC: 1.08 MG/DL (ref 0.57–1)
DEPRECATED RDW RBC AUTO: 44.7 FL (ref 37–54)
EGFRCR SERPLBLD CKD-EPI 2021: 55 ML/MIN/1.73
EOSINOPHIL # BLD AUTO: 0.21 10*3/MM3 (ref 0–0.4)
EOSINOPHIL NFR BLD AUTO: 2.6 % (ref 0.3–6.2)
ERYTHROCYTE [DISTWIDTH] IN BLOOD BY AUTOMATED COUNT: 13.1 % (ref 12.3–15.4)
GLOBULIN UR ELPH-MCNC: 2.5 GM/DL
GLUCOSE SERPL-MCNC: 90 MG/DL (ref 65–99)
HCT VFR BLD AUTO: 36.8 % (ref 34–46.6)
HDLC SERPL-MCNC: 42 MG/DL (ref 40–60)
HGB BLD-MCNC: 11.6 G/DL (ref 12–15.9)
IMM GRANULOCYTES # BLD AUTO: 0 10*3/MM3 (ref 0–0.05)
IMM GRANULOCYTES NFR BLD AUTO: 0 % (ref 0–0.5)
INR PPP: 0.98 (ref 0.86–1.15)
LDLC SERPL CALC-MCNC: 67 MG/DL (ref 0–100)
LDLC/HDLC SERPL: 1.4 {RATIO}
LYMPHOCYTES # BLD AUTO: 1.63 10*3/MM3 (ref 0.7–3.1)
LYMPHOCYTES NFR BLD AUTO: 20.1 % (ref 19.6–45.3)
MCH RBC QN AUTO: 28.7 PG (ref 26.6–33)
MCHC RBC AUTO-ENTMCNC: 31.5 G/DL (ref 31.5–35.7)
MCV RBC AUTO: 91.1 FL (ref 79–97)
MONOCYTES # BLD AUTO: 0.73 10*3/MM3 (ref 0.1–0.9)
MONOCYTES NFR BLD AUTO: 9 % (ref 5–12)
NEUTROPHILS NFR BLD AUTO: 5.52 10*3/MM3 (ref 1.7–7)
NEUTROPHILS NFR BLD AUTO: 68.1 % (ref 42.7–76)
PLATELET # BLD AUTO: 225 10*3/MM3 (ref 140–450)
PMV BLD AUTO: 10.7 FL (ref 6–12)
POTASSIUM SERPL-SCNC: 4.2 MMOL/L (ref 3.5–5.2)
PROT SERPL-MCNC: 6.7 G/DL (ref 6–8.5)
PROTHROMBIN TIME: 13.1 SECONDS (ref 11.8–14.9)
RBC # BLD AUTO: 4.04 10*6/MM3 (ref 3.77–5.28)
SODIUM SERPL-SCNC: 141 MMOL/L (ref 136–145)
TRIGL SERPL-MCNC: 225 MG/DL (ref 0–150)
TSH SERPL DL<=0.05 MIU/L-ACNC: 0.9 UIU/ML (ref 0.27–4.2)
VLDLC SERPL-MCNC: 37 MG/DL (ref 5–40)
WBC NRBC COR # BLD: 8.11 10*3/MM3 (ref 3.4–10.8)

## 2023-08-22 PROCEDURE — 80061 LIPID PANEL: CPT

## 2023-08-22 PROCEDURE — 93000 ELECTROCARDIOGRAM COMPLETE: CPT | Performed by: FAMILY MEDICINE

## 2023-08-22 PROCEDURE — 85025 COMPLETE CBC W/AUTO DIFF WBC: CPT

## 2023-08-22 PROCEDURE — 36415 COLL VENOUS BLD VENIPUNCTURE: CPT

## 2023-08-22 PROCEDURE — 84443 ASSAY THYROID STIM HORMONE: CPT

## 2023-08-22 PROCEDURE — 85610 PROTHROMBIN TIME: CPT

## 2023-08-22 PROCEDURE — 80053 COMPREHEN METABOLIC PANEL: CPT

## 2023-08-23 ENCOUNTER — OFFICE VISIT (OUTPATIENT)
Dept: FAMILY MEDICINE CLINIC | Age: 73
End: 2023-08-23
Payer: MEDICARE

## 2023-08-23 VITALS
BODY MASS INDEX: 25.27 KG/M2 | OXYGEN SATURATION: 99 % | SYSTOLIC BLOOD PRESSURE: 124 MMHG | HEIGHT: 64 IN | DIASTOLIC BLOOD PRESSURE: 61 MMHG | WEIGHT: 148 LBS | HEART RATE: 81 BPM

## 2023-08-23 DIAGNOSIS — H60.502 ACUTE OTITIS EXTERNA OF LEFT EAR, UNSPECIFIED TYPE: ICD-10-CM

## 2023-08-23 DIAGNOSIS — H92.02 LEFT EAR PAIN: Primary | ICD-10-CM

## 2023-08-23 PROCEDURE — 99213 OFFICE O/P EST LOW 20 MIN: CPT | Performed by: NURSE PRACTITIONER

## 2023-08-23 PROCEDURE — 1159F MED LIST DOCD IN RCRD: CPT | Performed by: NURSE PRACTITIONER

## 2023-08-23 PROCEDURE — 1160F RVW MEDS BY RX/DR IN RCRD: CPT | Performed by: NURSE PRACTITIONER

## 2023-08-23 PROCEDURE — 3074F SYST BP LT 130 MM HG: CPT | Performed by: NURSE PRACTITIONER

## 2023-08-23 PROCEDURE — 3078F DIAST BP <80 MM HG: CPT | Performed by: NURSE PRACTITIONER

## 2023-08-23 RX ORDER — ONDANSETRON 4 MG/1
TABLET, ORALLY DISINTEGRATING ORAL
COMMUNITY
End: 2023-08-23 | Stop reason: SDUPTHER

## 2023-08-23 RX ORDER — SCOLOPAMINE TRANSDERMAL SYSTEM 1 MG/1
PATCH, EXTENDED RELEASE TRANSDERMAL
COMMUNITY
End: 2023-08-23

## 2023-08-23 NOTE — PROGRESS NOTES
Procedure     ECG 12 Lead    Date/Time: 8/23/2023 12:45 PM  Performed by: Kari Harris MD  Authorized by: Kari Harris MD   Comparison: not compared with previous ECG   Rhythm: sinus rhythm  Rate: normal  BPM: 83  Conduction: conduction normal  ST Segments: ST segments normal  T Waves: T waves normal  QRS axis: normal  Other: no other findings    Clinical impression: normal ECG  Comments: Normal EKG.  ALLYSON

## 2023-08-23 NOTE — PROGRESS NOTES
"Chief Complaint  Earache (Sx started yesterday ; swelling inside and out )    Sushma Padilla presents to [facility]  Earache   There is pain in the left ear. This is a new problem. The current episode started yesterday. There has been no fever. Associated symptoms include ear discharge and neck pain. Pertinent negatives include no coughing, hearing loss, rhinorrhea or sore throat. She has tried antibiotics for the symptoms. The treatment provided no relief.     Objective   Vital Signs:  /61 (BP Location: Left arm, Patient Position: Sitting, Cuff Size: Adult)   Pulse 81   Ht 162.6 cm (64.02\")   Wt 67.1 kg (148 lb)   SpO2 99% Comment: room air  BMI 25.39 kg/mý   Estimated body mass index is 25.39 kg/mý as calculated from the following:    Height as of this encounter: 162.6 cm (64.02\").    Weight as of this encounter: 67.1 kg (148 lb).               Physical Exam  HENT:      Right Ear: Tympanic membrane, ear canal and external ear normal.      Left Ear: Hearing normal. Swelling and tenderness present.      Ears:      Comments: Unable to visualize TM at this time due to swelling, no drainage noted     Nose: Nose normal. No rhinorrhea.      Mouth/Throat:      Mouth: Mucous membranes are moist.      Pharynx: Oropharynx is clear. No posterior oropharyngeal erythema.   Cardiovascular:      Rate and Rhythm: Normal rate and regular rhythm.   Pulmonary:      Effort: Pulmonary effort is normal. No respiratory distress.      Breath sounds: Normal breath sounds. No stridor. No wheezing, rhonchi or rales.   Skin:     General: Skin is warm and dry.   Neurological:      Mental Status: She is alert and oriented to person, place, and time.   Psychiatric:         Mood and Affect: Mood normal.      Result Review :                   Assessment and Plan   Diagnoses and all orders for this visit:    1. Left ear pain (Primary)  Comments:  Keep up coming appointment with ENT " for further evaluation  Orders:  -     neomycin-polymyxin-hydrocortisone (CORTISPORIN) 3.5-25159-1 otic solution; Administer 3 drops into the left ear 3 (Three) Times a Day.  Dispense: 10 mL; Refill: 0    2. Acute otitis externa of left ear, unspecified type  Comments:  Cool compress and ear drops for swelling, OTC antinflammatory at night for pain and swelling  Orders:  -     neomycin-polymyxin-hydrocortisone (CORTISPORIN) 3.5-18129-7 otic solution; Administer 3 drops into the left ear 3 (Three) Times a Day.  Dispense: 10 mL; Refill: 0             Follow Up   Return if symptoms worsen or fail to improve.  Patient was given instructions and counseling regarding her condition or for health maintenance advice. Please see specific information pulled into the AVS if appropriate.

## 2023-08-25 ENCOUNTER — TELEPHONE (OUTPATIENT)
Dept: FAMILY MEDICINE CLINIC | Age: 73
End: 2023-08-25
Payer: MEDICARE

## 2023-08-25 NOTE — TELEPHONE ENCOUNTER
Would recommend continuing to take benadryl at night and keeping appointment with ENT, would not repeat oral antibiotic prior to ENT appointment.

## 2023-08-29 ENCOUNTER — OFFICE VISIT (OUTPATIENT)
Dept: OTOLARYNGOLOGY | Facility: CLINIC | Age: 73
End: 2023-08-29
Payer: MEDICARE

## 2023-08-29 VITALS — HEIGHT: 64 IN | TEMPERATURE: 97.8 F | WEIGHT: 153.4 LBS | BODY MASS INDEX: 26.19 KG/M2

## 2023-08-29 DIAGNOSIS — H60.312 ACUTE DIFFUSE OTITIS EXTERNA OF LEFT EAR: ICD-10-CM

## 2023-08-29 DIAGNOSIS — H61.22 IMPACTED CERUMEN OF LEFT EAR: ICD-10-CM

## 2023-08-29 DIAGNOSIS — J34.89 INTERNAL NASAL LESION: Primary | ICD-10-CM

## 2023-08-29 PROCEDURE — 1160F RVW MEDS BY RX/DR IN RCRD: CPT | Performed by: OTOLARYNGOLOGY

## 2023-08-29 PROCEDURE — 99214 OFFICE O/P EST MOD 30 MIN: CPT | Performed by: OTOLARYNGOLOGY

## 2023-08-29 PROCEDURE — 69210 REMOVE IMPACTED EAR WAX UNI: CPT | Performed by: OTOLARYNGOLOGY

## 2023-08-29 PROCEDURE — 1159F MED LIST DOCD IN RCRD: CPT | Performed by: OTOLARYNGOLOGY

## 2023-08-29 NOTE — PROGRESS NOTES
Patient Name: Luisa Padilla   Visit Date: 08/29/2023   Patient ID: [id number]  Provider: Dennis Garland MD    Sex: female  Location: [facility]   YOB: 1950  Location Address: [address] [place] [address], [address],?[place]?[id number]    Primary Care Provider Kari Harris MD  Location Phone: [phone]    Referring Provider: No ref. provider found        Chief Complaint  Follow-up (Ear check)    Subjective    History of Present Illness  Luisa Padilla is a 72 y.o. female who presents to [facility] today as a consult from No ref. provider found.    She presents to the clinic today for evaluation of otitis externa on the left as well as nasal lesions that come and go.  She informs me that she does pick crusting out of her nose and was having some difficulty with this.  I recommended using nasal saline gel at the last clinic visit and she has been using that some.  She is currently not having any active issues with this.    She did have a significant ear infection on the left side which sounds like otitis externa based on her description and was treated with antibiotics.  She notes that she was picking at the area prior to the infection.  It is doing much better now after she finished the course of antibiotics.  She wanted to have the ear reassessed.  Her hearing has been back to baseline.    Past Medical History:   Diagnosis Date    Abnormal findings on diagnostic imaging of other parts of digestive tract     Acute vaginitis     Allergy     CODEINE SULFATE,OXYCODONE/ACETAMINOP,HYDROXYZINE HCL   MODERATE    Anxiety and depression     Anxiety disorder, unspecified     AR (allergic rhinitis)     Chronic pain     Depression     Essential (primary) hypertension     GERD (gastroesophageal reflux disease)     History of back surgery     3 herniated disc (1 ruptured) - fusion    History of hemoptysis     APPROX 30 YRS AGO    Hyperlipidemia      Hypothyroidism     IBS (irritable bowel syndrome)     WITHOUT DIARRHEA    Insomnia     Low back pain     Obstructive sleep apnea (adult) (pediatric)     Osteoarthritis     Osteopenia     Other disturbances of smell and taste     Other long term (current) drug therapy     Other somatoform disorders     Pain in left shoulder     Pain in throat     PONV (postoperative nausea and vomiting)     Primary insomnia     Primary osteoarthritis, left hand     Primary osteoarthritis, right hand     Right hip pain     THR - 2020    Sciatica, right side     Sleep apnea     DOES NOT WEAR MACHINE    Unilateral primary osteoarthritis, right hip     Unspecified disorder of nose and nasal sinuses        Past Surgical History:   Procedure Laterality Date    BREAST AUGMENTATION Bilateral     CHOLECYSTECTOMY      COLONOSCOPY      ENDOSCOPY      HYSTERECTOMY      JOINT REPLACEMENT Right 07/2020    BALJINDER    LUMBAR DISC SURGERY      LUMBAR DISCECTOMY      L5-S1    TOTAL HIP ARTHROPLASTY Right 07/30/2020    Procedure: RIGHT TOTAL HIP ARTHROPLASTY;  Surgeon: Iker Cisneros MD;  Location: [facility];  Service: Orthopedics;  Laterality: Right;         Current Outpatient Medications:     atorvastatin (LIPITOR) 40 MG tablet, Take 1 tablet by mouth Daily., Disp: 90 tablet, Rfl: 1    cyclobenzaprine (FLEXERIL) 10 MG tablet, Take 1 tablet by mouth At Night As Needed for Muscle Spasms., Disp: 30 tablet, Rfl: 1    Diclofenac Sodium (VOLTAREN) 1 % gel gel, APPLY 4 GM TOPICALLY TO THE APPROPRIATE AREA AS DIRECTED 4 TIMES A DAY AS NEEDED (ARTHRITIS) FOR UP TO 30 DAYS., Disp: 100 g, Rfl: 2    famotidine (PEPCID) 40 MG tablet, Take 1 tablet by mouth 2 (Two) Times a Day., Disp: 180 tablet, Rfl: 1    fluticasone (FLONASE) 50 MCG/ACT nasal spray, 2 sprays into the nostril(s) as directed by provider 2 (Two) Times a Day., Disp: 16 g, Rfl: 0    gabapentin (NEURONTIN) 100 MG capsule, TAKE 2 TO 3 CAPSULES 3 TIMES DAILY AS NEEDED, Disp: 270 capsule, Rfl: 2    " levothyroxine (SYNTHROID, LEVOTHROID) 50 MCG tablet, Take 1 tablet by mouth Daily., Disp: 90 tablet, Rfl: 1    meloxicam (MOBIC) 15 MG tablet, TAKE 1 TABLET BY MOUTH DAILY AS NEEDED FOR MODERATE PAIN, Disp: 90 tablet, Rfl: 1    neomycin-polymyxin-hydrocortisone (CORTISPORIN) 3.5-32910-6 otic solution, Administer 3 drops into the left ear 3 (Three) Times a Day., Disp: 10 mL, Rfl: 0    omeprazole (priLOSEC) 40 MG capsule, TAKE 1 CAPSULE EVERY DAY, Disp: 90 capsule, Rfl: 1    ondansetron (ZOFRAN) 4 MG tablet, Take 1 tablet by mouth Every 12 (Twelve) Hours As Needed for Nausea or Vomiting., Disp: 60 tablet, Rfl: 0    PARoxetine (PAXIL) 20 MG tablet, Take 1 tablet by mouth Every Morning., Disp: 90 tablet, Rfl: 0     Allergies   Allergen Reactions    Codeine Nausea And Vomiting    Hydroxyzine Other (See Comments)     Blurred vision    Hydrocodone Unknown - Low Severity    Oxycodone-Acetaminophen Nausea Only       Family History   Problem Relation Age of Onset    Arthritis Mother     Breast cancer Mother     COPD Sister     Arthritis Maternal Grandmother     Cancer Other         Breast    Malig Hyperthermia Neg Hx         Social History     Social History Narrative    Not on file       Objective     Vital Signs:   Temp 97.8 °F (36.6 °C) (Tympanic)   Ht 162.6 cm (64.02\")   Wt 69.6 kg (153 lb 6.4 oz)   BMI 26.32 kg/m²       Physical Exam    Ear Cerumen Removal    Date/Time: 8/29/2023 3:56 PM  Performed by: Dennis Garland MD  Authorized by: Dennis Garland MD     Anesthesia:  Local Anesthetic: none  Location details: left ear  Procedure type: instrumentation, suction   Sedation:  Patient sedated: no          Constitutional   Appearance  : well developed, well-nourished, alert and in no acute distress, voice clear and strong    Head  Inspection  : no deformities or lesions  Face  Inspection  : No facial lesions; House-Brackmann I/VI bilaterally  Palpation  : No TMJ crepitus nor  muscle tenderness " bilaterally    Eyes  Vision  Visual Fields  : Extraocular movements are intact. No spontaneous or gaze-induced nystagmus.  Conjunctivae  : clear  Sclerae  : clear  Pupils and Irises  : pupils equal, round, and reactive to light.     Ears, Nose, Mouth and Throat    Ears    External Ears  : appearance within normal limits, no lesions present  Otoscopic Examination  : Cerumen and infected material removed from left ear canal with suction, infection resolving, tympanic membrane appearance within normal limits bilaterally without perforations, well-aerated middle ears  Hearing  : intact to conversational voice both ears  Tunning fork testing:     :    Nose    External Nose  : appearance normal  Intranasal Exam  : mucosa within normal limits, vestibules normal, no intranasal lesions present, septum midline, sinuses non tender to percussion  Oral Cavity    Oral Mucosa  : oral mucosa normal without pallor or cyanosis  Lips  : lip appearance normal  Teeth  : normal dentition for age  Gums  : gums pink, non-swollen, no bleeding present  Tongue  : tongue appearance normal; normal mobility  Palate  : hard palate normal, soft palate appearance normal with symmetric mobility    Throat    Oropharynx  : no inflammation or lesions present, tonsils within normal limits  Hypopharynx  : appearance within normal limits, superior epiglottis within normal limits  Larynx  : appearance within normal limits, vocal cords within normal limits, no lesions present    Neck  Inspection/Palpation  : normal appearance, no masses or tenderness, trachea midline; thyroid size normal, nontender, no nodules or masses present on palpation    Respiratory  Respiratory Effort  : breathing unlabored  Inspection of Chest  : normal appearance, no retractions    Cardiovascular  Heart  : regular rate and rhythm    Lymphatic  Neck  : no lymphadenopathy present  Supraclavicular Nodes  : no lymphadenopathy present  Preauricular Nodes  : no lymphadenopathy  present    Skin and Subcutaneous Tissue  General Inspection  : Regarding face and neck - there are no rashes present, no lesions present, and no areas of discoloration    Neurologic  Cranial Nerves  : cranial nerves II-XII are grossly intact bilaterally  Gait and Station  : normal gait, able to stand without diffculty    Psychiatric  Judgement and Insight  : judgment and insight intact  Mood and Affect  : mood normal, affect appropriate          Assessment and Plan    Diagnoses and all orders for this visit:    1. Internal nasal lesion (Primary)    2. Acute diffuse otitis externa of left ear    3. Impacted cerumen of left ear  -     Ear Cerumen Removal    Examination today revealed no intranasal lesions, but some dry mucosa.  I recommended using nasal saline gel for this.  Ear exam revealed cerumen and infectious debris in the left ear canal which I was able to clear out with suction and the microscope.  The infection seems to be resolving.  We discussed dry ear precautions.  I recommended not using anything to scratch your ears and to avoid using Q-tips.  If she has any further issues I can prescribe a steroid ointment for her to use and will have her contact me should there be any issues once infection completely resolves.    Follow Up   No follow-ups on file.  Patient was given instructions and counseling regarding her condition or for health maintenance advice. Please see specific information pulled into the AVS if appropriate.

## 2023-09-07 RX ORDER — ONDANSETRON 4 MG/1
TABLET, FILM COATED ORAL
Qty: 60 TABLET | Refills: 0 | Status: SHIPPED | OUTPATIENT
Start: 2023-09-07

## 2023-09-14 ENCOUNTER — TELEPHONE (OUTPATIENT)
Dept: FAMILY MEDICINE CLINIC | Age: 73
End: 2023-09-14
Payer: MEDICARE

## 2023-09-14 DIAGNOSIS — L30.9 DERMATITIS: Primary | ICD-10-CM

## 2023-09-14 NOTE — TELEPHONE ENCOUNTER
Pt states you told her to see the Derm for the condition under her eyes.  Please sign referral and add dx.  They needed a referral before they would see her.

## 2023-09-15 ENCOUNTER — TELEPHONE (OUTPATIENT)
Dept: FAMILY MEDICINE CLINIC | Age: 73
End: 2023-09-15
Payer: MEDICARE

## 2023-09-15 DIAGNOSIS — F33.1 MAJOR DEPRESSIVE DISORDER, RECURRENT EPISODE, MODERATE DEGREE: ICD-10-CM

## 2023-09-15 RX ORDER — PAROXETINE 30 MG/1
30 TABLET, FILM COATED ORAL EVERY MORNING
Qty: 30 TABLET | Refills: 5 | Status: SHIPPED | OUTPATIENT
Start: 2023-09-15

## 2023-09-15 NOTE — TELEPHONE ENCOUNTER
Increased anxiety.  She would like to increase her paroxetine to 30 mg.  Rx sent.  Thanks, BZALLYSON

## 2023-09-18 ENCOUNTER — TELEPHONE (OUTPATIENT)
Dept: FAMILY MEDICINE CLINIC | Age: 73
End: 2023-09-18
Payer: MEDICARE

## 2023-09-18 DIAGNOSIS — G89.29 CHRONIC BILATERAL LOW BACK PAIN WITH BILATERAL SCIATICA: Primary | ICD-10-CM

## 2023-09-18 DIAGNOSIS — M54.42 CHRONIC BILATERAL LOW BACK PAIN WITH BILATERAL SCIATICA: Primary | ICD-10-CM

## 2023-09-18 DIAGNOSIS — M54.41 CHRONIC BILATERAL LOW BACK PAIN WITH BILATERAL SCIATICA: Primary | ICD-10-CM

## 2023-09-18 NOTE — TELEPHONE ENCOUNTER
Pt states she is ready to get the MRI of her lower back.  See OV from 5/10/23.  The PT did not help.  Please advise.

## 2023-09-28 DIAGNOSIS — F33.1 MAJOR DEPRESSIVE DISORDER, RECURRENT EPISODE, MODERATE DEGREE: ICD-10-CM

## 2023-09-28 RX ORDER — PAROXETINE 30 MG/1
30 TABLET, FILM COATED ORAL EVERY MORNING
Qty: 90 TABLET | Refills: 1 | Status: SHIPPED | OUTPATIENT
Start: 2023-09-28

## 2023-10-03 ENCOUNTER — TELEPHONE (OUTPATIENT)
Dept: FAMILY MEDICINE CLINIC | Age: 73
End: 2023-10-03
Payer: MEDICARE

## 2023-10-03 DIAGNOSIS — Z78.0 MENOPAUSE: ICD-10-CM

## 2023-10-03 DIAGNOSIS — Z12.31 ENCOUNTER FOR SCREENING MAMMOGRAM FOR BREAST CANCER: Primary | ICD-10-CM

## 2023-10-03 NOTE — TELEPHONE ENCOUNTER
----- Message from Kari Harris MD sent at 3/21/2023 11:13 AM EDT -----  Regarding: f/u screening mammo  Please call pt to let her know that she is due for her yearly screening mammogram.  Please pend order for screening mammo and send to me.  Thanks, CHRISTEN

## 2023-10-11 ENCOUNTER — HOSPITAL ENCOUNTER (OUTPATIENT)
Dept: MRI IMAGING | Facility: HOSPITAL | Age: 73
Discharge: HOME | End: 2023-10-11
Admitting: FAMILY MEDICINE
Payer: MEDICARE

## 2023-10-11 DIAGNOSIS — G89.29 CHRONIC BILATERAL LOW BACK PAIN WITH BILATERAL SCIATICA: ICD-10-CM

## 2023-10-11 DIAGNOSIS — M54.41 CHRONIC BILATERAL LOW BACK PAIN WITH BILATERAL SCIATICA: ICD-10-CM

## 2023-10-11 DIAGNOSIS — M54.42 CHRONIC BILATERAL LOW BACK PAIN WITH BILATERAL SCIATICA: ICD-10-CM

## 2023-10-11 PROCEDURE — 72148 MRI LUMBAR SPINE W/O DYE: CPT

## 2023-10-13 ENCOUNTER — TELEPHONE (OUTPATIENT)
Dept: FAMILY MEDICINE CLINIC | Age: 73
End: 2023-10-13
Payer: MEDICARE

## 2023-10-13 DIAGNOSIS — M54.41 CHRONIC BILATERAL LOW BACK PAIN WITH BILATERAL SCIATICA: Primary | ICD-10-CM

## 2023-10-13 DIAGNOSIS — M54.42 CHRONIC BILATERAL LOW BACK PAIN WITH BILATERAL SCIATICA: Primary | ICD-10-CM

## 2023-10-13 DIAGNOSIS — G89.29 CHRONIC BILATERAL LOW BACK PAIN WITH BILATERAL SCIATICA: Primary | ICD-10-CM

## 2023-10-13 NOTE — TELEPHONE ENCOUNTER
Caller: Luisa Padilla    Relationship: Self    Best call back number: [phone]    What is the best time to reach you: ANY    Who are you requesting to speak with (clinical staff, provider,  specific staff member): MARSHALL    Do you know the name of the person who called: PATIENT    What was the call regarding: PATIENT WOULD LIKE TO DISCUSS BEFORE REFERRAL IS PLACED TO PAIN MANAGEMENT. THERE IS ANOTHER PROVIDER SHE WOULD PREFER. PLEASE CALL PATIENT BACK AND ADVISE.    Is it okay if the provider responds through MyChart: N/A

## 2023-10-16 NOTE — TELEPHONE ENCOUNTER
SPOKE WITH PATIENT SHE STATES SHE WANTS TO WAIT ON REFERRAL. SHE HAS AN UPCOMING APPT WITH  TO DISCUSS THIS. CLOSING REFERRAL AT THIS TIME PER PATIENT REQUEST.

## 2023-10-17 ENCOUNTER — TELEPHONE (OUTPATIENT)
Dept: FAMILY MEDICINE CLINIC | Age: 73
End: 2023-10-17

## 2023-10-17 NOTE — TELEPHONE ENCOUNTER
Caller: Luisa Padilla    Relationship: Self    Best call back number: N/A    What is the best time to reach you: N/A    Who are you requesting to speak with (clinical staff, provider,  specific staff member): MARSHALL    Do you know the name of the person who called: PATIENT    What was the call regarding: PATIENT'S INSURANCE HAS CHANGED AND SHE HAS TO CHANGE HER PHARMACY TO [employer] [facility]IL ORDER. PHONE NUMBER [phone]. PATIENT WANTED MARSHALL TO HAVE THIS INFORMATION AND NUMBER FOR FUTURE REFILLS AND PRESCRIPTIONS. PATIENT DOES NOT NEED A CALL BACK.    Is it okay if the provider responds through MyChart: N/A

## 2023-11-01 ENCOUNTER — OFFICE VISIT (OUTPATIENT)
Dept: FAMILY MEDICINE CLINIC | Age: 73
End: 2023-11-01
Payer: MEDICARE

## 2023-11-01 VITALS
WEIGHT: 150 LBS | DIASTOLIC BLOOD PRESSURE: 68 MMHG | HEART RATE: 82 BPM | SYSTOLIC BLOOD PRESSURE: 134 MMHG | HEIGHT: 64 IN | OXYGEN SATURATION: 98 % | BODY MASS INDEX: 25.61 KG/M2

## 2023-11-01 DIAGNOSIS — M54.41 CHRONIC BILATERAL LOW BACK PAIN WITH BILATERAL SCIATICA: Primary | ICD-10-CM

## 2023-11-01 DIAGNOSIS — M54.42 CHRONIC BILATERAL LOW BACK PAIN WITH BILATERAL SCIATICA: Primary | ICD-10-CM

## 2023-11-01 DIAGNOSIS — E78.2 MIXED HYPERLIPIDEMIA: ICD-10-CM

## 2023-11-01 DIAGNOSIS — F33.1 MAJOR DEPRESSIVE DISORDER, RECURRENT EPISODE, MODERATE DEGREE: ICD-10-CM

## 2023-11-01 DIAGNOSIS — E03.9 ACQUIRED HYPOTHYROIDISM: ICD-10-CM

## 2023-11-01 DIAGNOSIS — G89.29 CHRONIC BILATERAL LOW BACK PAIN WITH BILATERAL SCIATICA: Primary | ICD-10-CM

## 2023-11-01 DIAGNOSIS — I10 ESSENTIAL HYPERTENSION: ICD-10-CM

## 2023-11-01 PROBLEM — Z01.818 PREOPERATIVE CLEARANCE: Status: RESOLVED | Noted: 2023-08-18 | Resolved: 2023-11-01

## 2023-11-01 PROBLEM — H60.312 ACUTE DIFFUSE OTITIS EXTERNA OF LEFT EAR: Status: RESOLVED | Noted: 2023-08-29 | Resolved: 2023-11-01

## 2023-11-01 PROCEDURE — 1160F RVW MEDS BY RX/DR IN RCRD: CPT | Performed by: FAMILY MEDICINE

## 2023-11-01 PROCEDURE — 1159F MED LIST DOCD IN RCRD: CPT | Performed by: FAMILY MEDICINE

## 2023-11-01 PROCEDURE — 3078F DIAST BP <80 MM HG: CPT | Performed by: FAMILY MEDICINE

## 2023-11-01 PROCEDURE — 3075F SYST BP GE 130 - 139MM HG: CPT | Performed by: FAMILY MEDICINE

## 2023-11-01 PROCEDURE — 99214 OFFICE O/P EST MOD 30 MIN: CPT | Performed by: FAMILY MEDICINE

## 2023-11-01 RX ORDER — PAROXETINE HYDROCHLORIDE 40 MG/1
40 TABLET, FILM COATED ORAL EVERY MORNING
Qty: 90 TABLET | Refills: 1 | Status: SHIPPED | OUTPATIENT
Start: 2023-11-01

## 2023-11-01 NOTE — ASSESSMENT & PLAN NOTE
Anxiety has worsened since going down to 30 mg.  Increasing back up to 40 mg.  Rx sent.  Continue to monitor.

## 2023-11-01 NOTE — ASSESSMENT & PLAN NOTE
She has had MRI L spine that shows degeneration.  Referral was placed to Dr. [facility] but she canceled that appt.  After discussion, she is willing to get that rescheduled.  Stable on current regimen.  Symptoms are well controlled.  No adverse effects. She does require ongoing use of this controlled substance to function.  Tox screen was not due today.  Prior tox screen appropriate.  JOVANA was run today.  Refills were not needed today.  RTC 3 months.

## 2023-11-01 NOTE — PROGRESS NOTES
"Chief Complaint  Hypertension (3 month f/u)    Subjective          Luisa Padilla presents to [facility] today to follow up on complaint of dizziness.     She is on gabapentin and meloxicam for chronic low back pain with L>R sciatica. Pain is well-controlled.  She uses Flexeril as needed.  Tox screen is UTD, last done 5/2023.  She has used diclofenac and methocarbamol in the past.  She is following with Dr. Smith at [facility] for Synvisc injections in the bilateral knees.  He wants to do surgery on the bilateral knees.  We did a recent MRI L spine     She is not currently on antihypertensives.  Previously has used carvedilol but she had some hypotensive episodes so this has been held.  Blood pressure has been well controlled since.  Denies chest pain, shortness of breath, or palpitations.       She is on atorvastation for hyperlipidemia.  Denies myalgias.      She is on levothyroxine for hypothyroidism.  Denies heat/cold intolerance or changes in hair or skin.      She is on paroxetine for depression.  We have been tapering this down as she has felt very good and is not sure that she needs the medication anymore.  Currently at 30 mg daily.  She \"has been chewing on her teeth.\"        She is on omeprazole for GERD.  No indigestion or reflux.  She has prn Zofran in case nausea flares up.  She follows with Dr. Boo LAWSON.        Current Outpatient Medications:     atorvastatin (LIPITOR) 40 MG tablet, Take 1 tablet by mouth Daily., Disp: 90 tablet, Rfl: 1    cyclobenzaprine (FLEXERIL) 10 MG tablet, Take 1 tablet by mouth At Night As Needed for Muscle Spasms., Disp: 30 tablet, Rfl: 1    Diclofenac Sodium (VOLTAREN) 1 % gel gel, APPLY 4 GM TOPICALLY TO THE APPROPRIATE AREA AS DIRECTED 4 TIMES A DAY AS NEEDED (ARTHRITIS) FOR UP TO 30 DAYS., Disp: 100 g, Rfl: 2    famotidine (PEPCID) 40 MG tablet, Take 1 tablet by mouth 2 (Two) Times a Day., Disp: 180 tablet, Rfl: 1    " "fluticasone (FLONASE) 50 MCG/ACT nasal spray, 2 sprays into the nostril(s) as directed by provider 2 (Two) Times a Day., Disp: 16 g, Rfl: 0    gabapentin (NEURONTIN) 100 MG capsule, TAKE 2 TO 3 CAPSULES 3 TIMES DAILY AS NEEDED, Disp: 270 capsule, Rfl: 2    levothyroxine (SYNTHROID, LEVOTHROID) 50 MCG tablet, Take 1 tablet by mouth Daily., Disp: 90 tablet, Rfl: 1    meloxicam (MOBIC) 15 MG tablet, TAKE 1 TABLET BY MOUTH DAILY AS NEEDED FOR MODERATE PAIN, Disp: 90 tablet, Rfl: 1    omeprazole (priLOSEC) 40 MG capsule, TAKE 1 CAPSULE EVERY DAY, Disp: 90 capsule, Rfl: 1    ondansetron (ZOFRAN) 4 MG tablet, TAKE 1 TABLET EVERY 12 HOURS AS NEEDED FOR NAUSEA AND VOMITING, Disp: 60 tablet, Rfl: 0    PARoxetine (PAXIL) 40 MG tablet, Take 1 tablet by mouth Every Morning., Disp: 90 tablet, Rfl: 1    Allergies:  Codeine, Hydroxyzine, Hydrocodone, and Oxycodone-acetaminophen      Objective   Vital Signs:   Vitals:    11/01/23 1007   BP: 134/68   BP Location: Left arm   Patient Position: Sitting   Cuff Size: Adult   Pulse: 82   SpO2: 98%   Weight: 68 kg (150 lb)   Height: 162.6 cm (64.02\")         Physical Exam  Vitals reviewed.   Constitutional:       General: She is not in acute distress.     Appearance: Normal appearance. She is well-developed.   HENT:      Head: Normocephalic and atraumatic.      Right Ear: External ear normal.      Left Ear: External ear normal.   Eyes:      Extraocular Movements: Extraocular movements intact.      Conjunctiva/sclera: Conjunctivae normal.      Pupils: Pupils are equal, round, and reactive to light.   Cardiovascular:      Rate and Rhythm: Normal rate and regular rhythm.      Heart sounds: No murmur heard.  Pulmonary:      Effort: Pulmonary effort is normal.      Breath sounds: Normal breath sounds. No wheezing, rhonchi or rales.   Abdominal:      General: Bowel sounds are normal. There is no distension.      Palpations: Abdomen is soft.      Tenderness: There is no abdominal tenderness. "   Musculoskeletal:         General: Normal range of motion.   Neurological:      Mental Status: She is alert.   Psychiatric:         Mood and Affect: Affect normal.           Lab Results   Component Value Date    GLUCOSE 90 08/22/2023    BUN 17 08/22/2023    CREATININE 1.08 (H) 08/22/2023    EGFRIFNONA 60 01/12/2022    EGFRIFAFRI 69 01/12/2022    BCR 15.7 08/22/2023    K 4.2 08/22/2023    CO2 23.8 08/22/2023    CALCIUM 9.4 08/22/2023    PROTENTOTREF 6.6 01/12/2022    ALBUMIN 4.2 08/22/2023    LABIL2 2.0 01/12/2022    AST 23 08/22/2023    ALT 16 08/22/2023       Lab Results   Component Value Date    CHOL 146 08/22/2023    CHLPL 165 01/12/2022    TRIG 225 (H) 08/22/2023    HDL 42 08/22/2023    LDL 67 08/22/2023            Assessment and Plan    Diagnoses and all orders for this visit:    1. Chronic bilateral low back pain with bilateral sciatica (Primary)  Assessment & Plan:  She has had MRI L spine that shows degeneration.  Referral was placed to Dr. [facility] but she canceled that appt.  After discussion, she is willing to get that rescheduled.  Stable on current regimen.  Symptoms are well controlled.  No adverse effects. She does require ongoing use of this controlled substance to function.  Tox screen was not due today.  Prior tox screen appropriate.  JOVANA was run today.  Refills were not needed today.  RTC 3 months.        2. Essential hypertension  Overview:  Diet controlled.      3. Mixed hyperlipidemia  Assessment & Plan:  Stable on atorvastatin 40 mg daily.  Refills were not needed today.  Labs were not due today.  Continue to monitor.        4. Acquired hypothyroidism  Assessment & Plan:  Stable on levothyroxine 50 mcg daily.  Refills were not needed today.  Labs were not due today.  Continue to monitor.        5. Major depressive disorder, recurrent episode, moderate degree  Assessment & Plan:  Anxiety has worsened since going down to 30 mg.  Increasing back up to 40 mg.  Rx sent.  Continue  to monitor.      Orders:  -     PARoxetine (PAXIL) 40 MG tablet; Take 1 tablet by mouth Every Morning.  Dispense: 90 tablet; Refill: 1          Follow Up   Return in about 4 months (around 3/1/2024) for Medicare Wellness.  Patient was given instructions and counseling regarding her condition or for health maintenance advice. Please see specific information pulled into the AVS if appropriate.           05/26/2023

## 2023-11-21 ENCOUNTER — TELEPHONE (OUTPATIENT)
Dept: FAMILY MEDICINE CLINIC | Age: 73
End: 2023-11-21
Payer: MEDICARE

## 2023-11-21 DIAGNOSIS — M65.342 TRIGGER RING FINGER OF LEFT HAND: Primary | ICD-10-CM

## 2023-11-21 NOTE — TELEPHONE ENCOUNTER
Pt states she has tried taking her rings off for a couple weeks, but her fingers are not getting any better, also states her pointer finger is now involved, believes they may need to be opened up, please advise.

## 2023-11-22 RX ORDER — OMEPRAZOLE 40 MG/1
40 CAPSULE, DELAYED RELEASE ORAL DAILY
Qty: 90 CAPSULE | Refills: 1 | Status: SHIPPED | OUTPATIENT
Start: 2023-11-22

## 2023-11-22 NOTE — TELEPHONE ENCOUNTER
Noted.  This is about her trigger finger, correct?  Remind me which hand was involved.  In that event, we should get her in with hand surgery.  Does she have a preference on who she sees?  We usually send people either to [facility] versus [facility] available.  Thanks, CHRISTEN

## 2023-11-27 ENCOUNTER — TELEPHONE (OUTPATIENT)
Dept: FAMILY MEDICINE CLINIC | Age: 73
End: 2023-11-27

## 2023-11-27 NOTE — TELEPHONE ENCOUNTER
Pt states it is her Left hand and would like to go to [facility]. Please make sure correct place was added to referral and add dx and sign.

## 2023-11-27 NOTE — TELEPHONE ENCOUNTER
Caller: Luisa Padilla    Relationship to patient: Self    Best call back number: [phone]    Patient is needing: PATIENT CALLED IN AND SAID SHE RECEIVED A CALL FROM NURSE OLIVARES AND WANTED TO LET HER KNOW HER LEFT HAND WAS TAKEN CARE OF BY [facility].

## 2023-11-28 ENCOUNTER — PRE-ADMISSION TESTING (OUTPATIENT)
Dept: PREADMISSION TESTING | Facility: HOSPITAL | Age: 73
End: 2023-11-28
Payer: MEDICARE

## 2023-11-28 ENCOUNTER — HOSPITAL ENCOUNTER (OUTPATIENT)
Dept: GENERAL RADIOLOGY | Facility: HOSPITAL | Age: 73
Discharge: HOME | End: 2023-11-28
Payer: MEDICARE

## 2023-11-28 VITALS
HEART RATE: 79 BPM | BODY MASS INDEX: 24.97 KG/M2 | OXYGEN SATURATION: 97 % | WEIGHT: 149.9 LBS | SYSTOLIC BLOOD PRESSURE: 139 MMHG | HEIGHT: 65 IN | RESPIRATION RATE: 16 BRPM | DIASTOLIC BLOOD PRESSURE: 81 MMHG | TEMPERATURE: 97.4 F

## 2023-11-28 LAB
APTT PPP: 28.2 SECONDS (ref 22.7–35.4)
BILIRUB UR QL STRIP: NEGATIVE
CLARITY UR: CLEAR
COLOR UR: YELLOW
GLUCOSE UR STRIP-MCNC: NEGATIVE MG/DL
HGB UR QL STRIP.AUTO: NEGATIVE
INR PPP: 0.98 (ref 0.9–1.1)
KETONES UR QL STRIP: NEGATIVE
LEUKOCYTE ESTERASE UR QL STRIP.AUTO: NEGATIVE
NITRITE UR QL STRIP: NEGATIVE
PH UR STRIP.AUTO: 6.5 [PH] (ref 5–8)
PROT UR QL STRIP: NEGATIVE
PROTHROMBIN TIME: 13.1 SECONDS (ref 11.7–14.2)
SP GR UR STRIP: 1.02 (ref 1–1.03)
UROBILINOGEN UR QL STRIP: NORMAL

## 2023-11-28 PROCEDURE — 71046 X-RAY EXAM CHEST 2 VIEWS: CPT

## 2023-11-28 PROCEDURE — 73560 X-RAY EXAM OF KNEE 1 OR 2: CPT

## 2023-11-28 PROCEDURE — 85730 THROMBOPLASTIN TIME PARTIAL: CPT

## 2023-11-28 PROCEDURE — 81003 URINALYSIS AUTO W/O SCOPE: CPT

## 2023-11-28 PROCEDURE — 85610 PROTHROMBIN TIME: CPT

## 2023-11-28 PROCEDURE — 36415 COLL VENOUS BLD VENIPUNCTURE: CPT

## 2023-11-28 RX ORDER — CLINDAMYCIN HYDROCHLORIDE 300 MG/1
300 CAPSULE ORAL 3 TIMES DAILY
COMMUNITY

## 2023-11-28 RX ORDER — SENNOSIDES 8.6 MG
650 CAPSULE ORAL EVERY 8 HOURS PRN
COMMUNITY

## 2023-11-28 NOTE — DISCHARGE INSTRUCTIONS
Take the following medications the morning of surgery:    PEPCID, PRILOSEC, LEVOTHYROXINE, PAXIL         FLEXERIL IF NEEDED    If you are on prescription narcotic pain medication to control your pain you may also take that medication the morning of surgery.    General Instructions:  Do not eat solid food after midnight the night before surgery.  You may drink clear liquids day of surgery but must stop at least one hour before your hospital arrival time.  It is beneficial for you to have a clear drink that contains carbohydrates the day of surgery.  We suggest a 12 to 20 ounce bottle of Gatorade or Powerade for non-diabetic patients     Clear liquids are liquids you can see through.  Nothing red in color.     Plain water                               Sports drinks  Sodas                                   Gelatin (Jell-O)  Fruit juices without pulp such as white grape juice and apple juice  Popsicles that contain no fruit or yogurt  Tea or coffee (no cream or milk added)  Gatorade / Powerade  G2 / Powerade Zero      If applicable bring your C-PAP machine in with you to preop day of surgery.  Bring any papers given to you in the doctor’s office.  Wear clean comfortable clothes.  Do not wear contact lenses, false eyelashes or make-up.  Bring a case for your glasses.   Remove all piercings.  Leave jewelry and any other valuables at home.  Hair extensions with metal clips must be removed prior to surgery.  The Pre-Admission Testing nurse will instruct you to bring medications if unable to obtain an accurate list in Pre-Admission Testing.    REPORT TO SURGERY ENTRANCE ON 12-4-23  AM          Preventing a Surgical Site Infection:  For 2 to 3 days before surgery, avoid shaving with a razor because the razor can irritate skin and make it easier to develop an infection.    Any areas of open skin can increase the risk of a post-operative wound infection by allowing bacteria to enter and travel throughout the body.  Notify  your surgeon if you have any skin wounds / rashes even if it is not near the expected surgical site.  The area will need assessed to determine if surgery should be delayed until it is healed.  The night prior to surgery shower using a fresh bar of anti-bacterial soap (such as Dial) and clean washcloth.  Sleep in a clean bed with clean clothing.  Do not allow pets to sleep with you.  Shower on the morning of surgery using a fresh bar of anti-bacterial soap (such as Dial) and clean washcloth.  Dry with a clean towel and dress in clean clothing.  Ask your surgeon if you will be receiving antibiotics prior to surgery.  Make sure you, your family, and all healthcare providers clean their hands with soap and water or an alcohol based hand  before caring for you or your wound.    Day of surgery:  Your arrival time is approximately two hours before your scheduled surgery time.  Upon arrival, a Pre-op nurse and Anesthesiologist will review your health history, obtain vital signs, and answer questions you may have.  The only belongings needed at this time will be a list of your home medications and if applicable your C-PAP/BI-PAP machine.  A Pre-op nurse will start an IV and you may receive medication in preparation for surgery, including something to help you relax.     Please be aware that surgery does come with discomfort.  We want to make every effort to control your discomfort so please discuss any uncontrolled symptoms with your nurse.   Your doctor will most likely have prescribed pain medications.      If you are going home after surgery you will receive individualized written care instructions before being discharged.  A responsible adult must drive you to and from the hospital on the day of your surgery and stay with you for 24 hours.  Discharge prescriptions can be filled by the hospital pharmacy during regular pharmacy hours.  If you are having surgery late in the day/evening your prescription may be  e-prescribed to your pharmacy.  Please verify your pharmacy hours or chose a 24 hour pharmacy to avoid not having access to your prescription because your pharmacy has closed for the day.        If you have any questions please call Pre-Admission Testing at [phone].  Deductibles and co-payments are collected on the day of service. Please be prepared to pay the required co-pay, deductible or deposit on the day of service as defined by your plan.    Call your surgeon immediately if you experience any of the following symptoms:  Sore Throat  Shortness of Breath or difficulty breathing  Cough  Chills  Body soreness or muscle pain  Headache  Fever  New loss of taste or smell  Do not arrive for your surgery ill.  Your procedure will need to be rescheduled to another time.  You will need to call your physician before the day of surgery to avoid any unnecessary exposure to hospital staff as well as other patients.

## 2023-12-05 ENCOUNTER — TELEPHONE (OUTPATIENT)
Dept: FAMILY MEDICINE CLINIC | Age: 73
End: 2023-12-05

## 2023-12-05 NOTE — TELEPHONE ENCOUNTER
Caller: Luisa Padilla    Relationship: Self    Best call back number: [phone]     What is the medical concern/diagnosis: FOLLOW UP FROM SURGERY DONE 11.25.2023    What specialty or service is being requested:     What is the provider, practice or medical service name: JERALD BENAVIDEZ AT [facility]     What is the office location:     What is the office phone number:     Any additional details: PATIENT WOULD LIKE TO BE ADVISED IF A NEW REFERRAL IS NEEDED FOR PATIENT TO BE SEEN AT HER APPOINTMENT THAT IS SCHEDULED FOR 12.8.2023. PLEASE ADVISE AND IF SO PLEASE START REFERRAL AS SOON AS POSSIBLE

## 2023-12-07 DIAGNOSIS — M54.41 CHRONIC BILATERAL LOW BACK PAIN WITH BILATERAL SCIATICA: ICD-10-CM

## 2023-12-07 DIAGNOSIS — G89.29 CHRONIC BILATERAL LOW BACK PAIN WITH BILATERAL SCIATICA: ICD-10-CM

## 2023-12-07 DIAGNOSIS — M54.42 CHRONIC BILATERAL LOW BACK PAIN WITH BILATERAL SCIATICA: ICD-10-CM

## 2023-12-07 RX ORDER — CYCLOBENZAPRINE HCL 10 MG
10 TABLET ORAL NIGHTLY PRN
Qty: 30 TABLET | Refills: 0 | Status: SHIPPED | OUTPATIENT
Start: 2023-12-07

## 2023-12-19 ENCOUNTER — TELEPHONE (OUTPATIENT)
Dept: FAMILY MEDICINE CLINIC | Age: 73
End: 2023-12-19
Payer: MEDICARE

## 2023-12-19 NOTE — TELEPHONE ENCOUNTER
10.3.2023 spoke with pt regarding overdue mammo and dexa and gave number to reschedule (1st attempt)

## 2023-12-20 DIAGNOSIS — G89.29 CHRONIC BILATERAL LOW BACK PAIN WITH SCIATICA, SCIATICA LATERALITY UNSPECIFIED: ICD-10-CM

## 2023-12-20 DIAGNOSIS — M54.40 CHRONIC BILATERAL LOW BACK PAIN WITH SCIATICA, SCIATICA LATERALITY UNSPECIFIED: ICD-10-CM

## 2023-12-20 NOTE — TELEPHONE ENCOUNTER
Caller: Luisa Padilla    Relationship: Self    Best call back number: [phone]     Requested Prescriptions:   Requested Prescriptions     Pending Prescriptions Disp Refills    gabapentin (NEURONTIN) 100 MG capsule 270 capsule 2     Sig: TAKE 2 TO 3 CAPSULES 3 TIMES DAILY AS NEEDED        Pharmacy where request should be sent: [facility] - [place], [place] - [facility] [address] [facility] - [phone] [facility] [phone] FX     Last office visit with prescribing clinician: 11/1/2023   Last telemedicine visit with prescribing clinician: Visit date not found   Next office visit with prescribing clinician: 2/22/2024     Additional details provided by patient: PATIENT DOESN'T KNOW THE OTHER MEDICATIONS THAT NEEDS TO BE FILLED.     Does the patient have less than a 3 day supply:  [x] Yes  [] No    Would you like a call back once the refill request has been completed: [x] Yes [] No    If the office needs to give you a call back, can they leave a voicemail: [x] Yes [] No    Elbert Villarreal Rep   12/20/23 12:31 EST

## 2023-12-21 RX ORDER — GABAPENTIN 100 MG/1
300 CAPSULE ORAL NIGHTLY
Qty: 270 CAPSULE | Refills: 0 | Status: SHIPPED | OUTPATIENT
Start: 2023-12-21

## 2024-01-15 ENCOUNTER — TELEPHONE (OUTPATIENT)
Dept: FAMILY MEDICINE CLINIC | Age: 74
End: 2024-01-15
Payer: MEDICARE

## 2024-01-15 DIAGNOSIS — K21.9 GASTROESOPHAGEAL REFLUX DISEASE WITHOUT ESOPHAGITIS: ICD-10-CM

## 2024-01-15 NOTE — TELEPHONE ENCOUNTER
Caller: [facility] - [place], [place] - [address] [facility] - [phone] [facility] [phone] FX    Relationship: Pharmacy    Best call back number: [phone]    Requested Prescriptions:          famotidine (PEPCID) 40 MG tablet  40 mg, 2 Times Daily       Pharmacy where request should be sent: [facility] - [place], [place] - [address] [facility] - [phone] [facility] [phone] FX     Last office visit with prescribing clinician: 11/1/2023   Last telemedicine visit with prescribing clinician: Visit date not found   Next office visit with prescribing clinician: 2/22/2024     Additional details provided by patient: PHARMACY CHANGE TO [facility]    Does the patient have less than a 3 day supply:  [x] Yes  [] No    Would you like a call back once the refill request has been completed: [] Yes [] No    If the office needs to give you a call back, can they leave a voicemail: [] Yes [] No    Elbert Mclean Rep   01/15/24 15:08 EST

## 2024-01-16 RX ORDER — FAMOTIDINE 40 MG/1
40 TABLET, FILM COATED ORAL 2 TIMES DAILY
Qty: 180 TABLET | Refills: 1 | Status: SHIPPED | OUTPATIENT
Start: 2024-01-16 | End: 2024-01-22 | Stop reason: SDUPTHER

## 2024-01-17 ENCOUNTER — TELEPHONE (OUTPATIENT)
Dept: FAMILY MEDICINE CLINIC | Age: 74
End: 2024-01-17
Payer: MEDICARE

## 2024-01-17 DIAGNOSIS — M79.645 PAIN OF FINGER OF LEFT HAND: Primary | ICD-10-CM

## 2024-01-17 NOTE — TELEPHONE ENCOUNTER
Patient had left me a voicemail requesting a referral to [facility] with . Patient states she is scheduled on 1/23/24 to be seen for her LT ring finger. Please advise.

## 2024-01-22 DIAGNOSIS — E03.9 ACQUIRED HYPOTHYROIDISM: ICD-10-CM

## 2024-01-22 DIAGNOSIS — F33.1 MAJOR DEPRESSIVE DISORDER, RECURRENT EPISODE, MODERATE DEGREE: ICD-10-CM

## 2024-01-22 DIAGNOSIS — E78.2 MIXED HYPERLIPIDEMIA: ICD-10-CM

## 2024-01-22 DIAGNOSIS — M17.0 PRIMARY OSTEOARTHRITIS OF BOTH KNEES: ICD-10-CM

## 2024-01-22 DIAGNOSIS — K21.9 GASTROESOPHAGEAL REFLUX DISEASE WITHOUT ESOPHAGITIS: ICD-10-CM

## 2024-01-22 RX ORDER — ATORVASTATIN CALCIUM 40 MG/1
40 TABLET, FILM COATED ORAL DAILY
Qty: 90 TABLET | Refills: 1 | Status: SHIPPED | OUTPATIENT
Start: 2024-01-22

## 2024-01-22 RX ORDER — OMEPRAZOLE 40 MG/1
40 CAPSULE, DELAYED RELEASE ORAL DAILY
Qty: 90 CAPSULE | Refills: 1 | Status: SHIPPED | OUTPATIENT
Start: 2024-01-22

## 2024-01-22 RX ORDER — PAROXETINE HYDROCHLORIDE 40 MG/1
40 TABLET, FILM COATED ORAL EVERY MORNING
Qty: 90 TABLET | Refills: 1 | Status: SHIPPED | OUTPATIENT
Start: 2024-01-22

## 2024-01-22 RX ORDER — MELOXICAM 15 MG/1
15 TABLET ORAL DAILY PRN
Qty: 90 TABLET | Refills: 1 | Status: SHIPPED | OUTPATIENT
Start: 2024-01-22

## 2024-01-22 RX ORDER — LEVOTHYROXINE SODIUM 0.05 MG/1
50 TABLET ORAL DAILY
Qty: 90 TABLET | Refills: 1 | Status: SHIPPED | OUTPATIENT
Start: 2024-01-22

## 2024-01-22 RX ORDER — FAMOTIDINE 40 MG/1
40 TABLET, FILM COATED ORAL 2 TIMES DAILY
Qty: 180 TABLET | Refills: 1 | Status: SHIPPED | OUTPATIENT
Start: 2024-01-22

## 2024-01-22 NOTE — TELEPHONE ENCOUNTER
Caller: Luisa Padilla    Relationship: Self    Best call back number: [phone]     Requested Prescriptions:   ALL MEDICATIONS - SWITCHING PHARMACY       Pharmacy where request should be sent: "[facility], [address] [phone] [facility] [phone] FX     Last office visit with prescribing clinician: 11/1/2023   Last telemedicine visit with prescribing clinician: Visit date not found   Next office visit with prescribing clinician: 2/22/2024       Does the patient have less than a 3 day supply:  [x] Yes  [] No      Elbert Sanders Rep   01/22/24 10:56 EST

## 2024-02-14 ENCOUNTER — OFFICE VISIT (OUTPATIENT)
Dept: FAMILY MEDICINE CLINIC | Age: 74
End: 2024-02-14
Payer: MEDICARE

## 2024-02-14 ENCOUNTER — LAB (OUTPATIENT)
Dept: LAB | Facility: HOSPITAL | Age: 74
End: 2024-02-14
Payer: MEDICARE

## 2024-02-14 VITALS
WEIGHT: 155.8 LBS | HEIGHT: 65 IN | SYSTOLIC BLOOD PRESSURE: 115 MMHG | DIASTOLIC BLOOD PRESSURE: 70 MMHG | BODY MASS INDEX: 25.96 KG/M2 | HEART RATE: 87 BPM | OXYGEN SATURATION: 95 %

## 2024-02-14 DIAGNOSIS — M54.42 CHRONIC BILATERAL LOW BACK PAIN WITH BILATERAL SCIATICA: ICD-10-CM

## 2024-02-14 DIAGNOSIS — E03.9 ACQUIRED HYPOTHYROIDISM: ICD-10-CM

## 2024-02-14 DIAGNOSIS — E78.2 MIXED HYPERLIPIDEMIA: ICD-10-CM

## 2024-02-14 DIAGNOSIS — R05.3 CHRONIC COUGH: ICD-10-CM

## 2024-02-14 DIAGNOSIS — I10 ESSENTIAL HYPERTENSION: ICD-10-CM

## 2024-02-14 DIAGNOSIS — F33.1 MAJOR DEPRESSIVE DISORDER, RECURRENT EPISODE, MODERATE DEGREE: ICD-10-CM

## 2024-02-14 DIAGNOSIS — G89.29 CHRONIC BILATERAL LOW BACK PAIN WITH BILATERAL SCIATICA: ICD-10-CM

## 2024-02-14 DIAGNOSIS — R73.9 HYPERGLYCEMIA: ICD-10-CM

## 2024-02-14 DIAGNOSIS — K21.9 GASTROESOPHAGEAL REFLUX DISEASE WITHOUT ESOPHAGITIS: ICD-10-CM

## 2024-02-14 DIAGNOSIS — Z00.00 ANNUAL PHYSICAL EXAM: Primary | ICD-10-CM

## 2024-02-14 DIAGNOSIS — D64.9 ANEMIA, UNSPECIFIED TYPE: ICD-10-CM

## 2024-02-14 DIAGNOSIS — M54.41 CHRONIC BILATERAL LOW BACK PAIN WITH BILATERAL SCIATICA: ICD-10-CM

## 2024-02-14 LAB
FERRITIN SERPL-MCNC: 18.2 NG/ML (ref 13–150)
HBA1C MFR BLD: 5.9 % (ref 4.8–5.6)
IRON 24H UR-MRATE: 70 MCG/DL (ref 37–145)
IRON SATN MFR SERPL: 16 % (ref 20–50)
TIBC SERPL-MCNC: 446 MCG/DL (ref 298–536)
TRANSFERRIN SERPL-MCNC: 299 MG/DL (ref 200–360)

## 2024-02-14 PROCEDURE — 82728 ASSAY OF FERRITIN: CPT

## 2024-02-14 PROCEDURE — 84466 ASSAY OF TRANSFERRIN: CPT

## 2024-02-14 PROCEDURE — 83036 HEMOGLOBIN GLYCOSYLATED A1C: CPT

## 2024-02-14 PROCEDURE — 83540 ASSAY OF IRON: CPT

## 2024-02-14 PROCEDURE — 36415 COLL VENOUS BLD VENIPUNCTURE: CPT

## 2024-02-14 RX ORDER — LORATADINE 10 MG/1
10 TABLET ORAL DAILY
COMMUNITY

## 2024-02-14 RX ORDER — OXYCODONE HYDROCHLORIDE AND ACETAMINOPHEN 5; 325 MG/1; MG/1
TABLET ORAL
COMMUNITY
End: 2024-02-14

## 2024-02-14 RX ORDER — BROMPHENIRAMINE MALEATE, PSEUDOEPHEDRINE HYDROCHLORIDE, AND DEXTROMETHORPHAN HYDROBROMIDE 2; 30; 10 MG/5ML; MG/5ML; MG/5ML
5 SYRUP ORAL 4 TIMES DAILY PRN
Qty: 118 ML | Refills: 0 | Status: SHIPPED | OUTPATIENT
Start: 2024-02-14

## 2024-02-14 RX ORDER — CYCLOBENZAPRINE HCL 10 MG
10 TABLET ORAL NIGHTLY PRN
Qty: 90 TABLET | Refills: 0 | Status: SHIPPED | OUTPATIENT
Start: 2024-02-14

## 2024-02-19 DIAGNOSIS — G89.29 CHRONIC BILATERAL LOW BACK PAIN WITH BILATERAL SCIATICA: ICD-10-CM

## 2024-02-19 DIAGNOSIS — M54.41 CHRONIC BILATERAL LOW BACK PAIN WITH BILATERAL SCIATICA: ICD-10-CM

## 2024-02-19 DIAGNOSIS — M54.42 CHRONIC BILATERAL LOW BACK PAIN WITH BILATERAL SCIATICA: ICD-10-CM

## 2024-02-19 RX ORDER — CYCLOBENZAPRINE HCL 10 MG
10 TABLET ORAL NIGHTLY PRN
Qty: 30 TABLET | Refills: 0 | OUTPATIENT
Start: 2024-02-19

## 2024-02-28 ENCOUNTER — TELEPHONE (OUTPATIENT)
Dept: FAMILY MEDICINE CLINIC | Age: 74
End: 2024-02-28
Payer: MEDICARE

## 2024-03-01 ENCOUNTER — HOSPITAL ENCOUNTER (OUTPATIENT)
Dept: OTHER | Facility: HOSPITAL | Age: 74
Discharge: HOME | End: 2024-03-01

## 2024-03-01 DIAGNOSIS — M54.41 CHRONIC BILATERAL LOW BACK PAIN WITH BILATERAL SCIATICA: ICD-10-CM

## 2024-03-01 DIAGNOSIS — M54.42 CHRONIC BILATERAL LOW BACK PAIN WITH BILATERAL SCIATICA: ICD-10-CM

## 2024-03-01 DIAGNOSIS — G89.29 CHRONIC BILATERAL LOW BACK PAIN WITH BILATERAL SCIATICA: ICD-10-CM

## 2024-03-01 DIAGNOSIS — Z12.31 ENCOUNTER FOR SCREENING MAMMOGRAM FOR BREAST CANCER: ICD-10-CM

## 2024-03-01 RX ORDER — CYCLOBENZAPRINE HCL 10 MG
10 TABLET ORAL NIGHTLY PRN
Qty: 30 TABLET | Refills: 0 | Status: SHIPPED | OUTPATIENT
Start: 2024-03-01

## 2024-03-04 ENCOUNTER — PRIOR AUTHORIZATION (OUTPATIENT)
Dept: FAMILY MEDICINE CLINIC | Age: 74
End: 2024-03-04
Payer: MEDICARE

## 2024-03-20 ENCOUNTER — OFFICE VISIT (OUTPATIENT)
Dept: FAMILY MEDICINE CLINIC | Age: 74
End: 2024-03-20
Payer: MEDICARE

## 2024-03-20 ENCOUNTER — HOSPITAL ENCOUNTER (OUTPATIENT)
Dept: GENERAL RADIOLOGY | Facility: HOSPITAL | Age: 74
Discharge: HOME | End: 2024-03-20
Admitting: FAMILY MEDICINE
Payer: MEDICARE

## 2024-03-20 VITALS
OXYGEN SATURATION: 98 % | HEIGHT: 65 IN | TEMPERATURE: 98.4 F | DIASTOLIC BLOOD PRESSURE: 60 MMHG | WEIGHT: 156.2 LBS | SYSTOLIC BLOOD PRESSURE: 118 MMHG | HEART RATE: 95 BPM | BODY MASS INDEX: 26.02 KG/M2

## 2024-03-20 DIAGNOSIS — M25.511 CHRONIC PAIN OF BOTH SHOULDERS: ICD-10-CM

## 2024-03-20 DIAGNOSIS — M25.512 CHRONIC PAIN OF BOTH SHOULDERS: ICD-10-CM

## 2024-03-20 DIAGNOSIS — G89.29 CHRONIC PAIN OF BOTH SHOULDERS: Primary | ICD-10-CM

## 2024-03-20 DIAGNOSIS — G89.29 CHRONIC PAIN OF BOTH SHOULDERS: ICD-10-CM

## 2024-03-20 DIAGNOSIS — M25.511 CHRONIC PAIN OF BOTH SHOULDERS: Primary | ICD-10-CM

## 2024-03-20 DIAGNOSIS — M25.512 CHRONIC PAIN OF BOTH SHOULDERS: Primary | ICD-10-CM

## 2024-03-20 PROBLEM — Z00.00 ANNUAL PHYSICAL EXAM: Status: RESOLVED | Noted: 2021-12-01 | Resolved: 2024-03-20

## 2024-03-20 PROCEDURE — 73030 X-RAY EXAM OF SHOULDER: CPT

## 2024-03-20 NOTE — PROGRESS NOTES
"Chief Complaint  Shoulder Pain (Bilateral shoulder joint pain )    Subjective          Luisa Padilla presents to [facility] today for complaint of bilateral shoulder pain.      She has pain in the bilateral shoulders, L>R.  L shoulder has been for the past 6 months.  R shoulder started 4-5 days ago.  \"It just literally locked up.\"  It took 24 hours for it to loosen up.  She tried ibuprofen, Tylenol, muscle relaxer (Flexeril), diclofenac gel but without sustained relief.   No inciting event that she can think of.  She was polishing her 's truck but that was 3 weeks ago.  She did have a massage 2 days prior of that shoulder.  The massage was quite painful.      Current Outpatient Medications:     acetaminophen (TYLENOL) 650 MG 8 hr tablet, Take 1 tablet by mouth Every 8 (Eight) Hours As Needed for Mild Pain., Disp: , Rfl:     atorvastatin (LIPITOR) 40 MG tablet, Take 1 tablet by mouth Daily., Disp: 90 tablet, Rfl: 1    brompheniramine-pseudoephedrine-DM 30-2-10 MG/5ML syrup, Take 5 mL by mouth 4 (Four) Times a Day As Needed for Cough or Congestion., Disp: 118 mL, Rfl: 0    cyclobenzaprine (FLEXERIL) 10 MG tablet, TAKE 1 TABLET BY MOUTH AT NIGHT AS NEEDED FOR MUSCLE SPASM, Disp: 30 tablet, Rfl: 0    Diclofenac Sodium (VOLTAREN) 1 % gel gel, APPLY 4 GM TOPICALLY TO THE APPROPRIATE AREA AS DIRECTED 4 TIMES A DAY AS NEEDED (ARTHRITIS) FOR UP TO 30 DAYS. (Patient taking differently: 4 g As Needed.), Disp: 100 g, Rfl: 2    famotidine (PEPCID) 40 MG tablet, Take 1 tablet by mouth 2 (Two) Times a Day., Disp: 180 tablet, Rfl: 1    fluticasone (FLONASE) 50 MCG/ACT nasal spray, 2 sprays into the nostril(s) as directed by provider 2 (Two) Times a Day. (Patient taking differently: 2 sprays by Each Nare route As Needed.), Disp: 16 g, Rfl: 0    gabapentin (NEURONTIN) 100 MG capsule, Take 3 capsules by mouth Every Night., Disp: 270 capsule, Rfl: 0    levothyroxine (SYNTHROID, LEVOTHROID) 50 " "MCG tablet, Take 1 tablet by mouth Daily., Disp: 90 tablet, Rfl: 1    loratadine (CLARITIN) 10 MG tablet, Take 1 tablet by mouth Daily., Disp: , Rfl:     meloxicam (MOBIC) 15 MG tablet, Take 1 tablet by mouth Daily As Needed for Moderate Pain., Disp: 90 tablet, Rfl: 1    omeprazole (priLOSEC) 40 MG capsule, Take 1 capsule by mouth Daily., Disp: 90 capsule, Rfl: 1    ondansetron (ZOFRAN) 4 MG tablet, TAKE 1 TABLET EVERY 12 HOURS AS NEEDED FOR NAUSEA AND VOMITING, Disp: 60 tablet, Rfl: 0    PARoxetine (PAXIL) 40 MG tablet, Take 1 tablet by mouth Every Morning., Disp: 90 tablet, Rfl: 1  There are no discontinued medications.      Allergies:  Codeine, Hydrocodone, Hydroxyzine, Oxycodone-acetaminophen, and Vancomycin      Objective   Vital Signs:   Vitals:    03/20/24 1147   BP: 118/60   BP Location: Right arm   Patient Position: Sitting   Cuff Size: Adult   Pulse: 95   Temp: 98.4 °F (36.9 °C)   TempSrc: Oral   SpO2: 98%   Weight: 70.9 kg (156 lb 3.2 oz)   Height: 165.1 cm (65\")     Body mass index is 25.99 kg/m².           Physical Exam  Constitutional:       Appearance: Normal appearance.   HENT:      Head: Normocephalic and atraumatic.   Eyes:      Extraocular Movements: Extraocular movements intact.      Conjunctiva/sclera: Conjunctivae normal.   Pulmonary:      Effort: Pulmonary effort is normal. No respiratory distress.   Musculoskeletal:         General: Tenderness (+AC joint TTP of the bilateral shoulders with localized TTP as well over the R posterior shoulder) present. Normal range of motion.   Skin:     General: Skin is warm and dry.   Neurological:      General: No focal deficit present.      Mental Status: She is alert and oriented to person, place, and time.   Psychiatric:         Mood and Affect: Mood normal.         Behavior: Behavior normal.         Thought Content: Thought content normal.         Judgment: Judgment normal.           Lab Results   Component Value Date    GLUCOSE 90 08/22/2023    BUN 17 " 08/22/2023    CREATININE 1.08 (H) 08/22/2023    EGFRIFNONA 60 01/12/2022    EGFRIFAFRI 69 01/12/2022    BCR 15.7 08/22/2023    K 4.2 08/22/2023    CO2 23.8 08/22/2023    CALCIUM 9.4 08/22/2023    PROTENTOTREF 6.6 01/12/2022    ALBUMIN 4.0 11/25/2023    LABIL2 1.3 11/25/2023    AST 75 (H) 11/25/2023    ALT 70 (H) 11/25/2023       Lab Results   Component Value Date    CHOL 146 08/22/2023    CHLPL 165 01/12/2022    TRIG 225 (H) 08/22/2023    HDL 42 08/22/2023    LDL 67 08/22/2023       Lab Results   Component Value Date    WBC 9.06 11/25/2023    HGB 10.5 (L) 11/25/2023    HCT 32.4 (L) 11/25/2023    MCV 87.3 11/25/2023     11/25/2023            Assessment and Plan    Assessment & Plan  Chronic pain of both shoulders  She has had pain in the bilateral shoulders for the past 6 months, exacerbated over the past 4 to 5 days.  The right shoulder in particular locked up on her over the weekend.  She has been using Flexeril and NSAIDs along with alternating ice and heat.  Symptoms have improved somewhat.  Checking bilateral shoulder x-rays today.  Given that this has been a chronic problem, albeit with acute exacerbation, she is interested in getting in with Orthopedics.  In the meantime, she may continue to use conservative measures that she has been doing.  Also okay to use diclofenac gel to the shoulders bilaterally.    Orders Placed This Encounter   Procedures    XR Shoulder 2+ View Left    XR Shoulder 2+ View Right              Follow Up   Return if symptoms worsen or fail to improve, for or as scheduled 5/23/2024.  Patient was given instructions and counseling regarding her condition or for health maintenance advice. Please see specific information pulled into the AVS if appropriate.           03/20/2024

## 2024-03-21 DIAGNOSIS — M25.512 CHRONIC PAIN OF BOTH SHOULDERS: Primary | ICD-10-CM

## 2024-03-21 DIAGNOSIS — G89.29 CHRONIC PAIN OF BOTH SHOULDERS: Primary | ICD-10-CM

## 2024-03-21 DIAGNOSIS — M25.511 CHRONIC PAIN OF BOTH SHOULDERS: Primary | ICD-10-CM

## 2024-03-25 DIAGNOSIS — Z78.0 MENOPAUSE: ICD-10-CM

## 2024-05-08 RX ORDER — OMEPRAZOLE 40 MG/1
40 CAPSULE, DELAYED RELEASE ORAL DAILY
Qty: 90 CAPSULE | Refills: 1 | Status: SHIPPED | OUTPATIENT
Start: 2024-05-08

## 2024-05-22 DIAGNOSIS — M54.40 CHRONIC BILATERAL LOW BACK PAIN WITH SCIATICA, SCIATICA LATERALITY UNSPECIFIED: ICD-10-CM

## 2024-05-22 DIAGNOSIS — G89.29 CHRONIC BILATERAL LOW BACK PAIN WITH SCIATICA, SCIATICA LATERALITY UNSPECIFIED: ICD-10-CM

## 2024-05-22 RX ORDER — GABAPENTIN 100 MG/1
300 CAPSULE ORAL NIGHTLY
Qty: 270 CAPSULE | Refills: 0 | Status: CANCELLED | OUTPATIENT
Start: 2024-05-22

## 2024-05-22 NOTE — TELEPHONE ENCOUNTER
PATIENT WILL POSSIBLY NEED A SHORT TERM SUPPLY, SENT OVER TO [facility] FOR SHE RECEIVES THROUGH MAIL ORDER, PLEASE CONTACT PATIENT WITH STATUS OF THE REQUEST, PATIENT HAS FIVE LEFT ON HAND, ROUTED AS Take 3 capsules by mouth Every Night.     [facility] [address] [place] [address], [place] - [address] [facility] - [phone] [facility] [phone]  [phone]        .

## 2024-05-22 NOTE — TELEPHONE ENCOUNTER
Caller: Luisa Padilla    Relationship: Self    Best call back number: [phone]     Requested Prescriptions:   Requested Prescriptions     Pending Prescriptions Disp Refills    gabapentin (NEURONTIN) 100 MG capsule 270 capsule 0     Sig: Take 3 capsules by mouth Every Night.        Pharmacy where request should be sent: [facility], [address] [phone] [facility] [phone] FX     Last office visit with prescribing clinician: 3/20/2024   Last telemedicine visit with prescribing clinician: Visit date not found   Next office visit with prescribing clinician: 5/23/2024     Additional details provided by patient: LESS THAN THREE DAY SUPPLY ON HAND.     Does the patient have less than a 3 day supply:  [x] Yes  [] No    Would you like a call back once the refill request has been completed: [] Yes [x] No    If the office needs to give you a call back, can they leave a voicemail: [x] Yes [] No    Elbert David Rep   05/22/24 17:09 EDT

## 2024-05-23 ENCOUNTER — OFFICE VISIT (OUTPATIENT)
Dept: FAMILY MEDICINE CLINIC | Age: 74
End: 2024-05-23
Payer: MEDICARE

## 2024-05-23 ENCOUNTER — LAB (OUTPATIENT)
Dept: LAB | Facility: HOSPITAL | Age: 74
End: 2024-05-23
Payer: MEDICARE

## 2024-05-23 VITALS
SYSTOLIC BLOOD PRESSURE: 112 MMHG | DIASTOLIC BLOOD PRESSURE: 72 MMHG | TEMPERATURE: 98.7 F | BODY MASS INDEX: 26.06 KG/M2 | OXYGEN SATURATION: 97 % | HEIGHT: 65 IN | WEIGHT: 156.4 LBS | HEART RATE: 76 BPM

## 2024-05-23 DIAGNOSIS — F33.1 MAJOR DEPRESSIVE DISORDER, RECURRENT EPISODE, MODERATE DEGREE: ICD-10-CM

## 2024-05-23 DIAGNOSIS — E78.2 MIXED HYPERLIPIDEMIA: ICD-10-CM

## 2024-05-23 DIAGNOSIS — E03.9 ACQUIRED HYPOTHYROIDISM: ICD-10-CM

## 2024-05-23 DIAGNOSIS — F41.1 GENERALIZED ANXIETY DISORDER: ICD-10-CM

## 2024-05-23 DIAGNOSIS — D64.9 ANEMIA, UNSPECIFIED TYPE: ICD-10-CM

## 2024-05-23 DIAGNOSIS — G89.29 CHRONIC BILATERAL LOW BACK PAIN WITH BILATERAL SCIATICA: Primary | ICD-10-CM

## 2024-05-23 DIAGNOSIS — K21.9 GASTROESOPHAGEAL REFLUX DISEASE WITHOUT ESOPHAGITIS: ICD-10-CM

## 2024-05-23 DIAGNOSIS — M54.41 CHRONIC BILATERAL LOW BACK PAIN WITH BILATERAL SCIATICA: Primary | ICD-10-CM

## 2024-05-23 DIAGNOSIS — Z79.899 HIGH RISK MEDICATION USE: ICD-10-CM

## 2024-05-23 DIAGNOSIS — M54.42 CHRONIC BILATERAL LOW BACK PAIN WITH BILATERAL SCIATICA: Primary | ICD-10-CM

## 2024-05-23 DIAGNOSIS — G62.9 PERIPHERAL POLYNEUROPATHY: ICD-10-CM

## 2024-05-23 DIAGNOSIS — I10 ESSENTIAL HYPERTENSION: ICD-10-CM

## 2024-05-23 LAB
ALBUMIN SERPL-MCNC: 4.4 G/DL (ref 3.5–5.2)
ALBUMIN/GLOB SERPL: 1.8 G/DL
ALP SERPL-CCNC: 101 U/L (ref 39–117)
ALT SERPL W P-5'-P-CCNC: 18 U/L (ref 1–33)
AMPHET+METHAMPHET UR QL: NEGATIVE
AMPHETAMINES UR QL: NEGATIVE
ANION GAP SERPL CALCULATED.3IONS-SCNC: 8 MMOL/L (ref 5–15)
AST SERPL-CCNC: 21 U/L (ref 1–32)
BARBITURATES UR QL SCN: NEGATIVE
BASOPHILS # BLD AUTO: 0.02 10*3/MM3 (ref 0–0.2)
BASOPHILS NFR BLD AUTO: 0.4 % (ref 0–1.5)
BENZODIAZ UR QL SCN: NEGATIVE
BILIRUB SERPL-MCNC: 0.2 MG/DL (ref 0–1.2)
BUN SERPL-MCNC: 13 MG/DL (ref 8–23)
BUN/CREAT SERPL: 10.9 (ref 7–25)
BUPRENORPHINE SERPL-MCNC: NEGATIVE NG/ML
CALCIUM SPEC-SCNC: 9.3 MG/DL (ref 8.6–10.5)
CANNABINOIDS SERPL QL: NEGATIVE
CHLORIDE SERPL-SCNC: 105 MMOL/L (ref 98–107)
CHOLEST SERPL-MCNC: 159 MG/DL (ref 0–200)
CO2 SERPL-SCNC: 28 MMOL/L (ref 22–29)
COCAINE UR QL: NEGATIVE
CREAT SERPL-MCNC: 1.19 MG/DL (ref 0.57–1)
DEPRECATED RDW RBC AUTO: 49 FL (ref 37–54)
EGFRCR SERPLBLD CKD-EPI 2021: 48.7 ML/MIN/1.73
EOSINOPHIL # BLD AUTO: 0.86 10*3/MM3 (ref 0–0.4)
EOSINOPHIL NFR BLD AUTO: 16.4 % (ref 0.3–6.2)
ERYTHROCYTE [DISTWIDTH] IN BLOOD BY AUTOMATED COUNT: 14.4 % (ref 12.3–15.4)
EXPIRATION DATE: NORMAL
FERRITIN SERPL-MCNC: 18 NG/ML (ref 13–150)
FOLATE SERPL-MCNC: 19.9 NG/ML (ref 4.78–24.2)
GLOBULIN UR ELPH-MCNC: 2.5 GM/DL
GLUCOSE SERPL-MCNC: 96 MG/DL (ref 65–99)
HCT VFR BLD AUTO: 36.7 % (ref 34–46.6)
HDLC SERPL-MCNC: 44 MG/DL (ref 40–60)
HGB BLD-MCNC: 11 G/DL (ref 12–15.9)
IMM GRANULOCYTES # BLD AUTO: 0 10*3/MM3 (ref 0–0.05)
IMM GRANULOCYTES NFR BLD AUTO: 0 % (ref 0–0.5)
IRON 24H UR-MRATE: 54 MCG/DL (ref 37–145)
IRON SATN MFR SERPL: 13 % (ref 20–50)
LDLC SERPL CALC-MCNC: 87 MG/DL (ref 0–100)
LDLC/HDLC SERPL: 1.86 {RATIO}
LYMPHOCYTES # BLD AUTO: 1.65 10*3/MM3 (ref 0.7–3.1)
LYMPHOCYTES NFR BLD AUTO: 31.5 % (ref 19.6–45.3)
Lab: NORMAL
MCH RBC QN AUTO: 27.6 PG (ref 26.6–33)
MCHC RBC AUTO-ENTMCNC: 30 G/DL (ref 31.5–35.7)
MCV RBC AUTO: 92.2 FL (ref 79–97)
MDMA UR QL SCN: NEGATIVE
METHADONE UR QL SCN: NEGATIVE
MONOCYTES # BLD AUTO: 0.48 10*3/MM3 (ref 0.1–0.9)
MONOCYTES NFR BLD AUTO: 9.2 % (ref 5–12)
MORPHINE/OPIATES SCREEN, URINE: NEGATIVE
NEUTROPHILS NFR BLD AUTO: 2.22 10*3/MM3 (ref 1.7–7)
NEUTROPHILS NFR BLD AUTO: 42.5 % (ref 42.7–76)
OXYCODONE UR QL SCN: NEGATIVE
PCP UR QL SCN: NEGATIVE
PLATELET # BLD AUTO: 206 10*3/MM3 (ref 140–450)
PMV BLD AUTO: 10 FL (ref 6–12)
POTASSIUM SERPL-SCNC: 4.1 MMOL/L (ref 3.5–5.2)
PROT SERPL-MCNC: 6.9 G/DL (ref 6–8.5)
RBC # BLD AUTO: 3.98 10*6/MM3 (ref 3.77–5.28)
SODIUM SERPL-SCNC: 141 MMOL/L (ref 136–145)
TIBC SERPL-MCNC: 419 MCG/DL (ref 298–536)
TRANSFERRIN SERPL-MCNC: 281 MG/DL (ref 200–360)
TRIGL SERPL-MCNC: 165 MG/DL (ref 0–150)
TSH SERPL DL<=0.05 MIU/L-ACNC: 1.38 UIU/ML (ref 0.27–4.2)
VIT B12 BLD-MCNC: 945 PG/ML (ref 211–946)
VLDLC SERPL-MCNC: 28 MG/DL (ref 5–40)
WBC NRBC COR # BLD AUTO: 5.23 10*3/MM3 (ref 3.4–10.8)

## 2024-05-23 PROCEDURE — 84466 ASSAY OF TRANSFERRIN: CPT

## 2024-05-23 PROCEDURE — 1160F RVW MEDS BY RX/DR IN RCRD: CPT | Performed by: FAMILY MEDICINE

## 2024-05-23 PROCEDURE — 99214 OFFICE O/P EST MOD 30 MIN: CPT | Performed by: FAMILY MEDICINE

## 2024-05-23 PROCEDURE — 85025 COMPLETE CBC W/AUTO DIFF WBC: CPT

## 2024-05-23 PROCEDURE — 3078F DIAST BP <80 MM HG: CPT | Performed by: FAMILY MEDICINE

## 2024-05-23 PROCEDURE — 1125F AMNT PAIN NOTED PAIN PRSNT: CPT | Performed by: FAMILY MEDICINE

## 2024-05-23 PROCEDURE — 1159F MED LIST DOCD IN RCRD: CPT | Performed by: FAMILY MEDICINE

## 2024-05-23 PROCEDURE — 82607 VITAMIN B-12: CPT

## 2024-05-23 PROCEDURE — 80305 DRUG TEST PRSMV DIR OPT OBS: CPT | Performed by: FAMILY MEDICINE

## 2024-05-23 PROCEDURE — 82728 ASSAY OF FERRITIN: CPT

## 2024-05-23 PROCEDURE — 84425 ASSAY OF VITAMIN B-1: CPT

## 2024-05-23 PROCEDURE — 83540 ASSAY OF IRON: CPT

## 2024-05-23 PROCEDURE — 80061 LIPID PANEL: CPT

## 2024-05-23 PROCEDURE — 36415 COLL VENOUS BLD VENIPUNCTURE: CPT

## 2024-05-23 PROCEDURE — G2211 COMPLEX E/M VISIT ADD ON: HCPCS | Performed by: FAMILY MEDICINE

## 2024-05-23 PROCEDURE — 82746 ASSAY OF FOLIC ACID SERUM: CPT

## 2024-05-23 PROCEDURE — 84443 ASSAY THYROID STIM HORMONE: CPT

## 2024-05-23 PROCEDURE — 3074F SYST BP LT 130 MM HG: CPT | Performed by: FAMILY MEDICINE

## 2024-05-23 PROCEDURE — 80053 COMPREHEN METABOLIC PANEL: CPT

## 2024-05-23 RX ORDER — ROPINIROLE 0.25 MG/1
0.25 TABLET, FILM COATED ORAL NIGHTLY
Qty: 30 TABLET | Refills: 5 | Status: SHIPPED | OUTPATIENT
Start: 2024-05-23 | End: 2024-05-23

## 2024-05-23 RX ORDER — GABAPENTIN 100 MG/1
300 CAPSULE ORAL NIGHTLY
Qty: 45 CAPSULE | Refills: 0 | Status: SHIPPED | OUTPATIENT
Start: 2024-05-23 | End: 2024-05-23 | Stop reason: SDUPTHER

## 2024-05-23 RX ORDER — PAROXETINE HYDROCHLORIDE 40 MG/1
40 TABLET, FILM COATED ORAL EVERY MORNING
Qty: 90 TABLET | Refills: 1 | Status: SHIPPED | OUTPATIENT
Start: 2024-05-23

## 2024-05-23 RX ORDER — PANTOPRAZOLE SODIUM 40 MG/1
40 TABLET, DELAYED RELEASE ORAL DAILY
Qty: 90 TABLET | Refills: 1 | Status: SHIPPED | OUTPATIENT
Start: 2024-05-23

## 2024-05-23 RX ORDER — GABAPENTIN 100 MG/1
300 CAPSULE ORAL NIGHTLY
Qty: 270 CAPSULE | Refills: 0 | Status: SHIPPED | OUTPATIENT
Start: 2024-05-23

## 2024-05-23 RX ORDER — ROPINIROLE 0.25 MG/1
0.25 TABLET, FILM COATED ORAL NIGHTLY
Qty: 30 TABLET | Refills: 5 | Status: SHIPPED | OUTPATIENT
Start: 2024-05-23

## 2024-05-23 RX ORDER — ATORVASTATIN CALCIUM 40 MG/1
40 TABLET, FILM COATED ORAL DAILY
Qty: 90 TABLET | Refills: 1 | Status: SHIPPED | OUTPATIENT
Start: 2024-05-23

## 2024-05-23 NOTE — ASSESSMENT & PLAN NOTE
Checking labs, particularly iron panels.  Trial of ropinirole as sx are worst at night and movement makes the legs feel better.

## 2024-05-23 NOTE — ASSESSMENT & PLAN NOTE
Stable on current regimen.  Symptoms are well controlled.  No adverse effects. She does require ongoing use of this controlled substance to function.  Tox screen was due today.  Contract signed.  Prior tox screen appropriate.  JOVANA was run today.  Refills were needed today.  RTC 3 months.

## 2024-05-23 NOTE — ASSESSMENT & PLAN NOTE
Stable on levothyroxine 50 mcg daily.  Refills were not needed today.  Labs were due today.  Continue to monitor.

## 2024-05-23 NOTE — PROGRESS NOTES
"Chief Complaint  Back Pain (Chronic, 3 month f/u )    Subjective          Luisa Padilla presents to [facility] today for routine follow-up.  She is accompanied today by her  Martin.     She is on gabapentin, meloxicam and prn cyclobenzaprine for chronic low back pain, L>R radiculopathy. Pain is well-controlled on this regimen.  She is due for tox today.  She has used diclofenac and methocarbamol in the past.  She follows with Ortho for her bilateral knees.  She is currently seeing Dr. Awad and they are contemplating knee replacement.    She is now following with [facility] for arthritis in the hands.  She had a replacement in the 4th digit L hand.  She did have a postoperative infection but that has cleared up.    She has been having pain in the feet, dorsal aspect.  Bilateral feet, R=L.  +Aching.  Nonradiating.  Worse with walking.  However, she gets cramping in the legs that is better with walking.  These pain in the legs is worse at night.      She is not currently requiring antihypertensives.  Her blood pressure has been well controlled.  Denies chest pain, shortness of breath, or palpitations.   She has previously used carvedilol (hypotension).       She is on atorvastation for hyperlipidemia.  Denies myalgias.       She is on levothyroxine for hypothyroidism.  Denies cold/heat intolerance, changes in skin or hair.       She is on paroxetine for depression and anxiety.  Mood has been \"pretty good.\"  We did recently attempt a taper down on the paroxetine but anxiety increased when we attempted this, so she is back at the 40 mg dose and doing well.  No depressed mood or anhedonia, no anxiety or panic attacks.      She is on omeprazole for GERD.  She has been noticing some increased reflux.  She has prn Zofran that she keeps on hand.  Follows with Dr. Boo LAWSON.      Current Outpatient Medications:     acetaminophen (TYLENOL) 650 MG 8 hr tablet, Take 1 tablet by " mouth Every 8 (Eight) Hours As Needed for Mild Pain., Disp: , Rfl:     atorvastatin (LIPITOR) 40 MG tablet, Take 1 tablet by mouth Daily., Disp: 90 tablet, Rfl: 1    brompheniramine-pseudoephedrine-DM 30-2-10 MG/5ML syrup, Take 5 mL by mouth 4 (Four) Times a Day As Needed for Cough or Congestion., Disp: 118 mL, Rfl: 0    cyclobenzaprine (FLEXERIL) 10 MG tablet, TAKE 1 TABLET BY MOUTH AT NIGHT AS NEEDED FOR MUSCLE SPASM, Disp: 30 tablet, Rfl: 0    Diclofenac Sodium (VOLTAREN) 1 % gel gel, APPLY 4 GM TOPICALLY TO THE APPROPRIATE AREA AS DIRECTED 4 TIMES A DAY AS NEEDED (ARTHRITIS) FOR UP TO 30 DAYS. (Patient taking differently: 4 g As Needed.), Disp: 100 g, Rfl: 2    famotidine (PEPCID) 40 MG tablet, Take 1 tablet by mouth 2 (Two) Times a Day., Disp: 180 tablet, Rfl: 1    fluticasone (FLONASE) 50 MCG/ACT nasal spray, 2 sprays into the nostril(s) as directed by provider 2 (Two) Times a Day. (Patient taking differently: 2 sprays by Each Nare route As Needed.), Disp: 16 g, Rfl: 0    gabapentin (NEURONTIN) 100 MG capsule, Take 3 capsules by mouth Every Night., Disp: 270 capsule, Rfl: 0    levothyroxine (SYNTHROID, LEVOTHROID) 50 MCG tablet, Take 1 tablet by mouth Daily., Disp: 90 tablet, Rfl: 1    loratadine (CLARITIN) 10 MG tablet, Take 1 tablet by mouth Daily., Disp: , Rfl:     meloxicam (MOBIC) 15 MG tablet, Take 1 tablet by mouth Daily As Needed for Moderate Pain., Disp: 90 tablet, Rfl: 1    ondansetron (ZOFRAN) 4 MG tablet, TAKE 1 TABLET EVERY 12 HOURS AS NEEDED FOR NAUSEA AND VOMITING, Disp: 60 tablet, Rfl: 0    PARoxetine (PAXIL) 40 MG tablet, Take 1 tablet by mouth Every Morning., Disp: 90 tablet, Rfl: 1    rOPINIRole (REQUIP) 0.25 MG tablet, Take 1 tablet by mouth Every Night. Take 1 hour before bedtime., Disp: 30 tablet, Rfl: 5    pantoprazole (PROTONIX) 40 MG EC tablet, Take 1 tablet by mouth Daily., Disp: 90 tablet, Rfl: 1  Medications Discontinued During This Encounter   Medication Reason    gabapentin  "(NEURONTIN) 100 MG capsule Reorder    gabapentin (NEURONTIN) 100 MG capsule Reorder    atorvastatin (LIPITOR) 40 MG tablet Reorder    PARoxetine (PAXIL) 40 MG tablet Reorder    omeprazole (priLOSEC) 40 MG capsule Alternate therapy    rOPINIRole (REQUIP) 0.25 MG tablet          Allergies:  Codeine, Hydrocodone, Hydroxyzine, Oxycodone-acetaminophen, and Vancomycin      Objective   Vital Signs:   Vitals:    05/23/24 0950   BP: 112/72   Pulse: 76   Temp: 98.7 °F (37.1 °C)   TempSrc: Oral   SpO2: 97%   Weight: 70.9 kg (156 lb 6.4 oz)   Height: 165.1 cm (65\")     Body mass index is 26.03 kg/m².           Physical Exam  Vitals reviewed.   Constitutional:       General: She is not in acute distress.     Appearance: Normal appearance. She is well-developed.   HENT:      Head: Normocephalic and atraumatic.      Right Ear: External ear normal.      Left Ear: External ear normal.   Eyes:      Extraocular Movements: Extraocular movements intact.      Conjunctiva/sclera: Conjunctivae normal.      Pupils: Pupils are equal, round, and reactive to light.   Cardiovascular:      Rate and Rhythm: Normal rate and regular rhythm.      Heart sounds: No murmur heard.  Pulmonary:      Effort: Pulmonary effort is normal.      Breath sounds: Normal breath sounds. No wheezing, rhonchi or rales.   Abdominal:      General: Bowel sounds are normal. There is no distension.      Palpations: Abdomen is soft.      Tenderness: There is no abdominal tenderness.   Musculoskeletal:         General: Normal range of motion.   Neurological:      Mental Status: She is alert.   Psychiatric:         Mood and Affect: Mood and affect normal.         Behavior: Behavior normal.         Thought Content: Thought content normal.         Judgment: Judgment normal.           Lab Results   Component Value Date    GLUCOSE 90 08/22/2023    BUN 17 08/22/2023    CREATININE 1.08 (H) 08/22/2023    EGFRIFNONA 60 01/12/2022    EGFRIFAFRI 69 01/12/2022    BCR 15.7 08/22/2023    " K 4.2 08/22/2023    CO2 23.8 08/22/2023    CALCIUM 9.4 08/22/2023    PROTENTOTREF 6.6 01/12/2022    ALBUMIN 4.0 11/25/2023    LABIL2 1.3 11/25/2023    AST 75 (H) 11/25/2023    ALT 70 (H) 11/25/2023       Lab Results   Component Value Date    CHOL 146 08/22/2023    CHLPL 165 01/12/2022    TRIG 225 (H) 08/22/2023    HDL 42 08/22/2023    LDL 67 08/22/2023       Lab Results   Component Value Date    WBC 9.06 11/25/2023    HGB 10.5 (L) 11/25/2023    HCT 32.4 (L) 11/25/2023    MCV 87.3 11/25/2023     11/25/2023            Assessment and Plan    Assessment & Plan  Chronic bilateral low back pain with bilateral sciatica  Stable on current regimen.  Symptoms are well controlled.  No adverse effects. She does require ongoing use of this controlled substance to function.  Tox screen was due today.  Contract signed.  Prior tox screen appropriate.  JOVANA was run today.  Refills were needed today.  RTC 3 months.   High risk medication use    Mixed hyperlipidemia   Stable on atorvastatin 40 mg daily.  Refills were needed today.  Labs were due today.  Continue to monitor.   Acquired hypothyroidism  Stable on levothyroxine 50 mcg daily.  Refills were not needed today.  Labs were due today.  Continue to monitor.   Major depressive disorder, recurrent episode, moderate degree  Stable on paroxetine 40 mg daily.  Refills were needed today.  Continue to monitor.   Generalized anxiety disorder  As per depression plan.  Essential hypertension  Diet controlled.  Peripheral polyneuropathy  Checking labs, particularly iron panels.  Trial of ropinirole as sx are worst at night and movement makes the legs feel better.  Anemia, unspecified type  Checking labs.  Gastroesophageal reflux disease without esophagitis  Increased sx.  Switching from omeprazole to pantoprazole and she will call Dr. Haddad's office for an appt in case this switch is insufficient.      Orders Placed This Encounter   Procedures    Lipid Panel    Comprehensive  Metabolic Panel    CBC Auto Differential    TSH    Iron Profile    Ferritin    Vitamin B12 & Folate    Vitamin B1, Whole Blood    POC Medline 12 Panel Urine Drug Screen     New Medications Ordered This Visit   Medications    gabapentin (NEURONTIN) 100 MG capsule     Sig: Take 3 capsules by mouth Every Night.     Dispense:  270 capsule     Refill:  0    atorvastatin (LIPITOR) 40 MG tablet     Sig: Take 1 tablet by mouth Daily.     Dispense:  90 tablet     Refill:  1    PARoxetine (PAXIL) 40 MG tablet     Sig: Take 1 tablet by mouth Every Morning.     Dispense:  90 tablet     Refill:  1    pantoprazole (PROTONIX) 40 MG EC tablet     Sig: Take 1 tablet by mouth Daily.     Dispense:  90 tablet     Refill:  1    rOPINIRole (REQUIP) 0.25 MG tablet     Sig: Take 1 tablet by mouth Every Night. Take 1 hour before bedtime.     Dispense:  30 tablet     Refill:  5           Follow Up   Return in about 3 months (around 8/23/2024) for Recheck.  Patient was given instructions and counseling regarding her condition or for health maintenance advice. Please see specific information pulled into the AVS if appropriate.           05/23/2024

## 2024-05-23 NOTE — TELEPHONE ENCOUNTER
LV:3/20/24  LF:12/21/23 #270  Tox:5/10/23    Pt requesting long term rx to mail order and need short supply sent to [facility], pt is almost out.

## 2024-05-23 NOTE — ASSESSMENT & PLAN NOTE
Stable on atorvastatin 40 mg daily.  Refills were needed today.  Labs were due today.  Continue to monitor.

## 2024-05-23 NOTE — ASSESSMENT & PLAN NOTE
Increased sx.  Switching from omeprazole to pantoprazole and she will call Dr. Haddad's office for an appt in case this switch is insufficient.

## 2024-05-29 LAB — VIT B1 BLD-SCNC: 128.8 NMOL/L (ref 66.5–200)

## 2024-05-30 ENCOUNTER — TELEPHONE (OUTPATIENT)
Dept: FAMILY MEDICINE CLINIC | Age: 74
End: 2024-05-30
Payer: MEDICARE

## 2024-05-30 DIAGNOSIS — K21.9 GASTROESOPHAGEAL REFLUX DISEASE WITHOUT ESOPHAGITIS: Primary | ICD-10-CM

## 2024-05-30 NOTE — TELEPHONE ENCOUNTER
Left detailed message on VM that I need to know what she is going to see them for to put on the referral.  OK FOR HUB TO RELAY

## 2024-05-30 NOTE — TELEPHONE ENCOUNTER
Caller: Luisa Padilla    Relationship to patient: Self    Best call back number: [phone]    Patient is needing: PATIENT CALLED IN AND SAID THAT SHE IS NEEDING A REFERRAL TO GASTROENTEROLOGIST FAXED IN ORDER FOR HER TO BE ABLE TO SCHEDULE AN APPOINTMENT FAX#[phone]

## 2024-06-07 ENCOUNTER — TELEPHONE (OUTPATIENT)
Dept: FAMILY MEDICINE CLINIC | Age: 74
End: 2024-06-07
Payer: MEDICARE

## 2024-06-07 DIAGNOSIS — M54.42 CHRONIC BILATERAL LOW BACK PAIN WITH BILATERAL SCIATICA: ICD-10-CM

## 2024-06-07 DIAGNOSIS — G89.29 CHRONIC BILATERAL LOW BACK PAIN WITH BILATERAL SCIATICA: ICD-10-CM

## 2024-06-07 DIAGNOSIS — M54.41 CHRONIC BILATERAL LOW BACK PAIN WITH BILATERAL SCIATICA: ICD-10-CM

## 2024-06-07 RX ORDER — GABAPENTIN 100 MG/1
300 CAPSULE ORAL NIGHTLY
Qty: 90 CAPSULE | Refills: 0 | Status: SHIPPED | OUTPATIENT
Start: 2024-06-07

## 2024-06-07 NOTE — TELEPHONE ENCOUNTER
Spoke to mail order and they have it still on hold because they did not have her social security number.  I gave them her number and told them to keep on hold for another 30 days that we were going to send into local pharm since pt was out of meds.  Pt informed.

## 2024-06-07 NOTE — TELEPHONE ENCOUNTER
Caller: Luisa Padilla    Relationship: Self    Best call back number:     [phone]       What was the call regarding: PATIENT STATES THAT [facility] NEVER FILLED HER gabapentin (NEURONTIN) 100 MG capsule SO SHE WOULD LIKE FOR IT TO BE SENT TO [facility] - [place], [place] - [address] [facility] - [phone]  - [phone] FX      PATIENT STATES THAT SHE WOULD LIKE A CALLBACK WHEN THIS IS SENT, SHE IS OUT OF THIS MEDICATION.

## 2024-07-09 ENCOUNTER — TELEPHONE (OUTPATIENT)
Dept: FAMILY MEDICINE CLINIC | Age: 74
End: 2024-07-09
Payer: MEDICARE

## 2024-07-09 ENCOUNTER — OFFICE VISIT (OUTPATIENT)
Dept: FAMILY MEDICINE CLINIC | Age: 74
End: 2024-07-09
Payer: MEDICARE

## 2024-07-09 VITALS
TEMPERATURE: 98.2 F | HEIGHT: 65 IN | HEART RATE: 75 BPM | WEIGHT: 156.2 LBS | SYSTOLIC BLOOD PRESSURE: 111 MMHG | DIASTOLIC BLOOD PRESSURE: 74 MMHG | OXYGEN SATURATION: 96 % | BODY MASS INDEX: 26.02 KG/M2

## 2024-07-09 DIAGNOSIS — H61.23 BILATERAL IMPACTED CERUMEN: ICD-10-CM

## 2024-07-09 DIAGNOSIS — J30.9 ALLERGIC RHINITIS, UNSPECIFIED SEASONALITY, UNSPECIFIED TRIGGER: Primary | ICD-10-CM

## 2024-07-09 DIAGNOSIS — H93.8X3 CRACKLING SOUND IN EAR, BILATERAL: ICD-10-CM

## 2024-07-09 DIAGNOSIS — R49.0 HOARSENESS OF VOICE: ICD-10-CM

## 2024-07-09 PROCEDURE — 69209 REMOVE IMPACTED EAR WAX UNI: CPT

## 2024-07-09 PROCEDURE — 3074F SYST BP LT 130 MM HG: CPT

## 2024-07-09 PROCEDURE — 1159F MED LIST DOCD IN RCRD: CPT

## 2024-07-09 PROCEDURE — 3078F DIAST BP <80 MM HG: CPT

## 2024-07-09 PROCEDURE — 1125F AMNT PAIN NOTED PAIN PRSNT: CPT

## 2024-07-09 PROCEDURE — 99213 OFFICE O/P EST LOW 20 MIN: CPT

## 2024-07-09 PROCEDURE — 1160F RVW MEDS BY RX/DR IN RCRD: CPT

## 2024-07-09 RX ORDER — OMEPRAZOLE 40 MG/1
40 CAPSULE, DELAYED RELEASE ORAL DAILY
COMMUNITY

## 2024-07-09 RX ORDER — FLUTICASONE PROPIONATE 50 MCG
2 SPRAY, SUSPENSION (ML) NASAL 2 TIMES DAILY
Qty: 16 G | Refills: 0 | Status: SHIPPED | OUTPATIENT
Start: 2024-07-09

## 2024-07-09 NOTE — PROGRESS NOTES
Ear Cerumen Removal    Date/Time: 7/9/2024 8:56 AM    Performed by: Deisi Washington MA  Authorized by: Ilsa Davies APRN    Anesthesia:  Local Anesthetic: none  Location details: left ear and right ear  Patient tolerance: patient tolerated the procedure well with no immediate complications  Procedure type: irrigation   Sedation:  Patient sedated: no

## 2024-07-09 NOTE — TELEPHONE ENCOUNTER
.    Caller: Luisa Padilla    Relationship: Self    Best call back number: [phone]    What medication are you requesting: MEDICATED EAR DROPS    What are your current symptoms: N/A    How long have you been experiencing symptoms: N/A    Have you had these symptoms before:    [] Yes  [] No    Have you been treated for these symptoms before:   [] Yes  [] No    If a prescription is needed, what is your preferred pharmacy and phone number: [facility] [address] [place] [address] [address] [facility] [phone] [facility] [phone] FX     Additional notes:PATIENT WAS SEEN IN OFFICE AND HAD HER EARS CLEANED OUT. SHE WAS TOLD EAR DROPS WOULD BE CALLED IN FOR HER TODAY. PLEASE SEND NEW PRESCRIPTION TO [facility].

## 2024-07-09 NOTE — PROGRESS NOTES
"Subjective     CHIEF COMPLAINT    Chief Complaint   Patient presents with    Cough     Cough, congestion, drainage, X couple months        History of Present Illness  Patient is a 73-year-old female, presenting to the clinic today with complaints of postnasal drainage, intermittent sore throat and chronic cough.  Symptoms have been present for a year.  She has been to ear nose and throat in the past and diagnosed with a chronic cough.  She notes that her voice will get hoarse.  She was on Claritin but did not think this was working so recently switched to Zyrtec a few months ago.  She does not take Flonase.  She has not seen an allergist.  Her cough is sometimes productive of clear mucus.  She is a non-smoker.  No exposure to any illnesses.  Also has a diagnosis of GERD and is following with GI.    Additionally, she complains of a sound in her ear that sounds like bugs.  This has been present for a long time as well.  Denies any hearing loss or pain in her ears.      Review of Systems   Constitutional:  Negative for chills and fever.   HENT:  Positive for postnasal drip, sore throat (\"sometimes\") and voice change. Negative for congestion and rhinorrhea.    Respiratory:  Positive for cough. Negative for shortness of breath and wheezing.    Cardiovascular:  Negative for chest pain.   Gastrointestinal:  Negative for nausea and vomiting.   Musculoskeletal:  Negative for myalgias.   Neurological:  Negative for headaches.     Past Medical History:   Diagnosis Date    Abnormal findings on diagnostic imaging of other parts of digestive tract     Acute vaginitis     Allergy     CODEINE SULFATE,OXYCODONE/ACETAMINOP,HYDROXYZINE HCL   MODERATE    Anxiety and depression     Anxiety disorder, unspecified     AR (allergic rhinitis)     Chronic pain     Depression     Essential (primary) hypertension     GERD (gastroesophageal reflux disease)     History of back surgery     3 herniated disc (1 ruptured) - fusion    History of " COVID-19     History of hemoptysis     APPROX 30 YRS AGO    Hyperlipidemia     Hypothyroidism     IBS (irritable bowel syndrome)     WITHOUT DIARRHEA    Insomnia     Low back pain     Osteoarthritis     Osteopenia     Other disturbances of smell and taste     Other long term (current) drug therapy     Other somatoform disorders     Pain in left shoulder     Pain in throat     PONV (postoperative nausea and vomiting)     Primary insomnia     Primary osteoarthritis, left hand     Primary osteoarthritis, right hand     Right hip pain      -     Sciatica, right side     Sleep apnea     DOES NOT WEAR MACHINE    Spider bite 2023    LEFT INDEX FINGER / DR. HAGER NOTIFIED 23    Unilateral primary osteoarthritis, right hip     Unspecified disorder of nose and nasal sinuses             Past Surgical History:   Procedure Laterality Date    BREAST AUGMENTATION Bilateral     CHOLECYSTECTOMY      COLONOSCOPY  2022    ENDOSCOPY      HYSTERECTOMY      INCISION AND DRAINAGE ABSCESS Left 2023    SPIDER BITE LT INDEX FINGER    JOINT REPLACEMENT Right 2020    BALJINDER    LUMBAR DISC SURGERY      LUMBAR DISCECTOMY      L5-S1    TOTAL HIP ARTHROPLASTY Right 2020    Procedure: RIGHT TOTAL HIP ARTHROPLASTY;  Surgeon: Iker Cisneros MD;  Location: [facility] OR;  Service: Orthopedics;  Laterality: Right;            Family History   Problem Relation Age of Onset    Arthritis Mother     Breast cancer Mother     COPD Sister     Arthritis Maternal Grandmother     Cancer Other         Breast    Malig Hyperthermia Neg Hx             Social History     Socioeconomic History    Marital status:      Spouse name: EM    Number of children: 2   Tobacco Use    Smoking status: Former     Current packs/day: 0.00     Average packs/day: 3.0 packs/day for 30.0 years (90.0 ttl pk-yrs)     Types: Cigarettes     Start date:      Quit date:      Years since quittin.5     Passive exposure:  Never    Smokeless tobacco: Never   Vaping Use    Vaping status: Never Used   Substance and Sexual Activity    Alcohol use: Not Currently     Comment: socially    Drug use: Never    Sexual activity: Not Currently     Partners: Male     Birth control/protection: Surgical     Comment: hysterectomy            Allergies   Allergen Reactions    Codeine Nausea And Vomiting    Hydrocodone Nausea And Vomiting    Hydroxyzine Other (See Comments)     Blurred vision    Oxycodone-Acetaminophen Nausea Only    Oxycodone Unknown - High Severity     Other reaction(s): Nausea Only   Other reaction(s): Nausea Only   <paragraph>Other reaction(s): Nausea Only</paragraph>    Vancomycin Itching     Pt. Complained of itching and flushed    Sulfamethoxazole-Trimethoprim Rash            Current Outpatient Medications on File Prior to Visit   Medication Sig Dispense Refill    acetaminophen (TYLENOL) 650 MG 8 hr tablet Take 1 tablet by mouth Every 8 (Eight) Hours As Needed for Mild Pain.      atorvastatin (LIPITOR) 40 MG tablet Take 1 tablet by mouth Daily. 90 tablet 1    brompheniramine-pseudoephedrine-DM 30-2-10 MG/5ML syrup Take 5 mL by mouth 4 (Four) Times a Day As Needed for Cough or Congestion. 118 mL 0    Cetirizine HCl (ZYRTEC ALLERGY PO) Take  by mouth Daily.      cyclobenzaprine (FLEXERIL) 10 MG tablet TAKE 1 TABLET BY MOUTH AT NIGHT AS NEEDED FOR MUSCLE SPASM 30 tablet 0    Diclofenac Sodium (VOLTAREN) 1 % gel gel APPLY 4 GM TOPICALLY TO THE APPROPRIATE AREA AS DIRECTED 4 TIMES A DAY AS NEEDED (ARTHRITIS) FOR UP TO 30 DAYS. (Patient taking differently: 4 g As Needed.) 100 g 2    famotidine (PEPCID) 40 MG tablet Take 1 tablet by mouth 2 (Two) Times a Day. 180 tablet 1    gabapentin (NEURONTIN) 100 MG capsule Take 3 capsules by mouth Every Night. 90 capsule 0    Iron, Ferrous Sulfate, 325 (65 Fe) MG tablet Take 325 mg by mouth Daily. 30 tablet 5    levothyroxine (SYNTHROID, LEVOTHROID) 50 MCG tablet Take 1 tablet by mouth Daily. 90  "tablet 1    meloxicam (MOBIC) 15 MG tablet Take 1 tablet by mouth Daily As Needed for Moderate Pain. 90 tablet 1    omeprazole (priLOSEC) 40 MG capsule Take 1 capsule by mouth Daily.      ondansetron (ZOFRAN) 4 MG tablet TAKE 1 TABLET EVERY 12 HOURS AS NEEDED FOR NAUSEA AND VOMITING 60 tablet 0    pantoprazole (PROTONIX) 40 MG EC tablet Take 1 tablet by mouth Daily. 90 tablet 1    PARoxetine (PAXIL) 40 MG tablet Take 1 tablet by mouth Every Morning. 90 tablet 1    rOPINIRole (REQUIP) 0.25 MG tablet Take 1 tablet by mouth Every Night. Take 1 hour before bedtime. 30 tablet 5    loratadine (CLARITIN) 10 MG tablet Take 1 tablet by mouth Daily. (Patient not taking: Reported on 7/9/2024)       No current facility-administered medications on file prior to visit.     /74 (BP Location: Left arm, Patient Position: Sitting, Cuff Size: Large Adult)   Pulse 75   Temp 98.2 °F (36.8 °C) (Oral)   Ht 165.1 cm (65\")   Wt 70.9 kg (156 lb 3.2 oz)   SpO2 96%   BMI 25.99 kg/m²     Objective     Physical Exam  Vitals and nursing note reviewed.   Constitutional:       General: She is not in acute distress.     Appearance: Normal appearance. She is not ill-appearing.   HENT:      Head: Normocephalic.      Right Ear: Tympanic membrane, ear canal and external ear normal. There is impacted cerumen.      Left Ear: Tympanic membrane, ear canal and external ear normal. There is impacted cerumen.      Nose: Nose normal.      Mouth/Throat:      Lips: Pink.      Mouth: Mucous membranes are moist.      Pharynx: Uvula midline. Posterior oropharyngeal erythema present. No pharyngeal swelling, oropharyngeal exudate or uvula swelling.      Comments: Frequent throat clearing noted during exam  Eyes:      Pupils: Pupils are equal, round, and reactive to light.   Cardiovascular:      Rate and Rhythm: Normal rate and regular rhythm.      Heart sounds: Normal heart sounds. No murmur heard.  Pulmonary:      Effort: Pulmonary effort is normal. No " accessory muscle usage or respiratory distress.      Breath sounds: Normal breath sounds. No wheezing or rhonchi.   Musculoskeletal:      Cervical back: Normal range of motion.   Skin:     General: Skin is warm and dry.   Neurological:      General: No focal deficit present.      Mental Status: She is alert and oriented to person, place, and time.   Psychiatric:         Mood and Affect: Mood and affect normal.         Behavior: Behavior normal.          The following data was reviewed for today's visit:  Office Visit with Kari Harris MD (2024)  Assessment & Plan  Allergic rhinitis, unspecified seasonality, unspecified trigger  Suspect symptoms may be related in the part to allergic rhinitis.  I have sent in Flonase for her, she is to continue Zyrtec.  Also sent referral to an allergist for further evaluation.  Bilateral impacted cerumen  Unable to completely remove cerumen in office.  Recommend Debrox over-the-counter, can return to office if needed for cleaning.  Also sending referral to ENT.  Hoarseness of voice  Sending referral to ENT  Crackling sound in ear, bilateral  Sending referral to ENT.    Orders Placed This Encounter   Procedures    Ear Cerumen Removal    Ambulatory Referral to Allergy    Ambulatory Referral to ENT (Otolaryngology)     New Medications Ordered This Visit   Medications    fluticasone (FLONASE) 50 MCG/ACT nasal spray     Si sprays into the nostril(s) as directed by provider 2 (Two) Times a Day.     Dispense:  16 g     Refill:  0            Follow-up:  Return if symptoms worsen or fail to improve.  Patient was given instructions and counseling regarding her condition or for health maintenance advice. Please see specific information pulled into the AVS if appropriate.

## 2024-07-31 ENCOUNTER — TELEPHONE (OUTPATIENT)
Dept: FAMILY MEDICINE CLINIC | Age: 74
End: 2024-07-31
Payer: MEDICARE

## 2024-07-31 DIAGNOSIS — T78.40XS ALLERGY, SEQUELA: Primary | ICD-10-CM

## 2024-07-31 RX ORDER — DEXTROMETHORPHAN HYDROBROMIDE AND PROMETHAZINE HYDROCHLORIDE 15; 6.25 MG/5ML; MG/5ML
5 SYRUP ORAL NIGHTLY PRN
Qty: 118 ML | Refills: 0 | Status: SHIPPED | OUTPATIENT
Start: 2024-07-31

## 2024-07-31 NOTE — TELEPHONE ENCOUNTER
Caller: Luisa aPdilla    Relationship: Self    Best call back number: [phone]    What medication are you requesting: PROMETHAZINE-DM    What are your current symptoms: CHRONIC COUGH     How long have you been experiencing symptoms: ONGOING COUGH     Have you had these symptoms before:    [x] Yes  [] No    Have you been treated for these symptoms before:   [x] Yes  [] No    If a prescription is needed, what is your preferred pharmacy and phone number: [facility] [address] [place] [address] [address] [facility] [phone] [facility] [phone]      Additional notes:  PRIOR PRESCRIPTION FOR THIS MEDICATION WAS SENT BY CAROL METZGER  04/12/2022  PATIENT CANNOT TAKE COUGH MEDICATION  PRESCRIBED 02/14/2024 DUE TO THE TASTE

## 2024-07-31 NOTE — TELEPHONE ENCOUNTER
I will send her in a refill on this.  We can talk more at her appt on 8/23/24 about the cough to see what we can do to treat the underlying cause as opposed to just the sx.  Thanks, CHRISTEN

## 2024-07-31 NOTE — TELEPHONE ENCOUNTER
Caller: Luisa Padilla    Relationship: Self    Best call back number: [phone]    What is the best time to reach you: ANYTIME     Who are you requesting to speak with (clinical staff, provider,  specific staff member): CLINICAL        What was the call regarding:        [facility] IS CALLING TO SEE  IF A REFERRAL IS NEEDED FOR AN ALLERGY PROVIDER -  [facility]ER[facility].  SHE SAID IF IT IS NEEDED TO SEND IT TO      CHRISTIANO FERNANDEZ     [phone]

## 2024-08-01 ENCOUNTER — TELEPHONE (OUTPATIENT)
Dept: FAMILY MEDICINE CLINIC | Age: 74
End: 2024-08-01
Payer: MEDICARE

## 2024-08-01 DIAGNOSIS — M54.41 CHRONIC BILATERAL LOW BACK PAIN WITH BILATERAL SCIATICA: Primary | ICD-10-CM

## 2024-08-01 DIAGNOSIS — G89.29 CHRONIC BILATERAL LOW BACK PAIN WITH BILATERAL SCIATICA: Primary | ICD-10-CM

## 2024-08-01 DIAGNOSIS — M54.42 CHRONIC BILATERAL LOW BACK PAIN WITH BILATERAL SCIATICA: Primary | ICD-10-CM

## 2024-08-01 NOTE — TELEPHONE ENCOUNTER
Pt called Dr Sepulveda's office to set up an appointment and they would not let her without a new referral.  States the one from Feb is too old.  Please sign referral.

## 2024-08-13 ENCOUNTER — OFFICE (OUTPATIENT)
Dept: URBAN - METROPOLITAN AREA CLINIC 76 | Facility: CLINIC | Age: 74
End: 2024-08-13
Payer: MEDICARE

## 2024-08-13 VITALS
SYSTOLIC BLOOD PRESSURE: 130 MMHG | DIASTOLIC BLOOD PRESSURE: 80 MMHG | HEIGHT: 66 IN | WEIGHT: 157 LBS | OXYGEN SATURATION: 98 % | DIASTOLIC BLOOD PRESSURE: 78 MMHG | HEART RATE: 78 BPM | SYSTOLIC BLOOD PRESSURE: 144 MMHG

## 2024-08-13 DIAGNOSIS — K21.9 GASTRO-ESOPHAGEAL REFLUX DISEASE WITHOUT ESOPHAGITIS: ICD-10-CM

## 2024-08-13 PROCEDURE — 99213 OFFICE O/P EST LOW 20 MIN: CPT | Performed by: INTERNAL MEDICINE

## 2024-08-14 DIAGNOSIS — K21.9 GASTROESOPHAGEAL REFLUX DISEASE WITHOUT ESOPHAGITIS: ICD-10-CM

## 2024-08-14 RX ORDER — FAMOTIDINE 40 MG/1
40 TABLET, FILM COATED ORAL 2 TIMES DAILY
Qty: 180 TABLET | Refills: 1 | Status: SHIPPED | OUTPATIENT
Start: 2024-08-14

## 2024-08-23 ENCOUNTER — OFFICE VISIT (OUTPATIENT)
Dept: FAMILY MEDICINE CLINIC | Age: 74
End: 2024-08-23
Payer: MEDICARE

## 2024-08-23 VITALS
WEIGHT: 156.2 LBS | TEMPERATURE: 98.1 F | DIASTOLIC BLOOD PRESSURE: 82 MMHG | HEIGHT: 65 IN | HEART RATE: 75 BPM | OXYGEN SATURATION: 95 % | SYSTOLIC BLOOD PRESSURE: 136 MMHG | BODY MASS INDEX: 26.02 KG/M2

## 2024-08-23 DIAGNOSIS — E03.9 ACQUIRED HYPOTHYROIDISM: ICD-10-CM

## 2024-08-23 DIAGNOSIS — E78.2 MIXED HYPERLIPIDEMIA: ICD-10-CM

## 2024-08-23 DIAGNOSIS — R05.3 CHRONIC COUGH: Primary | ICD-10-CM

## 2024-08-23 DIAGNOSIS — F41.1 GENERALIZED ANXIETY DISORDER: ICD-10-CM

## 2024-08-23 DIAGNOSIS — I10 ESSENTIAL HYPERTENSION: ICD-10-CM

## 2024-08-23 DIAGNOSIS — R91.8 PULMONARY NODULES: ICD-10-CM

## 2024-08-23 DIAGNOSIS — K21.9 GASTROESOPHAGEAL REFLUX DISEASE WITHOUT ESOPHAGITIS: ICD-10-CM

## 2024-08-23 DIAGNOSIS — F33.1 MAJOR DEPRESSIVE DISORDER, RECURRENT EPISODE, MODERATE DEGREE: ICD-10-CM

## 2024-08-23 PROBLEM — N64.4 BREAST PAIN, LEFT: Status: RESOLVED | Noted: 2022-09-29 | Resolved: 2024-08-23

## 2024-08-23 PROBLEM — Z79.899 HIGH RISK MEDICATION USE: Status: RESOLVED | Noted: 2021-08-20 | Resolved: 2024-08-23

## 2024-08-23 PROBLEM — B35.1 ONYCHOMYCOSIS: Status: RESOLVED | Noted: 2022-06-23 | Resolved: 2024-08-23

## 2024-08-23 PROCEDURE — 1160F RVW MEDS BY RX/DR IN RCRD: CPT | Performed by: FAMILY MEDICINE

## 2024-08-23 PROCEDURE — 3075F SYST BP GE 130 - 139MM HG: CPT | Performed by: FAMILY MEDICINE

## 2024-08-23 PROCEDURE — 99214 OFFICE O/P EST MOD 30 MIN: CPT | Performed by: FAMILY MEDICINE

## 2024-08-23 PROCEDURE — 1159F MED LIST DOCD IN RCRD: CPT | Performed by: FAMILY MEDICINE

## 2024-08-23 PROCEDURE — 1125F AMNT PAIN NOTED PAIN PRSNT: CPT | Performed by: FAMILY MEDICINE

## 2024-08-23 PROCEDURE — G2211 COMPLEX E/M VISIT ADD ON: HCPCS | Performed by: FAMILY MEDICINE

## 2024-08-23 PROCEDURE — 3079F DIAST BP 80-89 MM HG: CPT | Performed by: FAMILY MEDICINE

## 2024-08-23 RX ORDER — VONOPRAZAN FUMARATE 26.72 MG/1
TABLET ORAL
COMMUNITY

## 2024-08-23 RX ORDER — BUSPIRONE HYDROCHLORIDE 5 MG/1
5 TABLET ORAL 2 TIMES DAILY PRN
Qty: 60 TABLET | Refills: 5 | Status: SHIPPED | OUTPATIENT
Start: 2024-08-23

## 2024-08-23 RX ORDER — OMEPRAZOLE 40 MG/1
40 CAPSULE, DELAYED RELEASE ORAL DAILY
COMMUNITY

## 2024-08-23 NOTE — PROGRESS NOTES
"Chief Complaint  Pain    Subjective          Luisa Padilla presents to [facility] today for routine follow-up.  She is accompanied today by her  Martin.     She has been having problems with chronic cough.  She saw ENT and GI both who feel that her chronic cough is due to the reflex.  Chest x-ray done 2/2024 was unremarkable.  She is due for low-dose CT chest, last done 7/2023.  She is on omeprazole/pantoprazole/famotidine/vonoprazan for GERD.  Omeprazole is being held on Dr. Haddad's instruction while she tries out the new Voquezna for 10 days, but she has been continuing to take her pantoprazole.  Unclear whether Dr. Haddad knows she is using this one.  She also uses Zofran as needed.  She follows with Dr. Haddad GI.    She is on gabapentin, meloxicam and prn cyclobenzaprine for chronic low back pain, L>R radiculopathy.  She is following with Dr. Aiken Pain management.  They are considering doing a nerve ablation in her knee.  Pain is well-controlled on this regimen.  She has previously used diclofenac and methocarbamol.  She is following with Ortho Dr. Awad/Francisco Jean for bilateral knee pain.  He is going to be doing shoulder surgery on 11/15/24.  She has been dealing with a lot of headaches.    She is following with [facility] for arthritis in the hands.     She feels tinnitus in the L ear, \"like cicadas.\"      She is on ropinirole for RLS.     She is not currently requiring antihypertensives.  Her blood pressure has been well controlled.  No chest pain, shortness of breath, or palpitations.   She has previously used carvedilol (hypotension).       She is on atorvastation for HLD.  No myalgias.       She is on levothyroxine for hypothyroidism.  No cold/heat intolerance, no changes in skin or hair.       She is on paroxetine for depression and anxiety.  Mood has been \"anxious.\"  She has been very stressed out.  She feels like she is having some panic attacks.  "          Current Outpatient Medications:     acetaminophen (TYLENOL) 650 MG 8 hr tablet, Take 1 tablet by mouth Every 8 (Eight) Hours As Needed for Mild Pain., Disp: , Rfl:     atorvastatin (LIPITOR) 40 MG tablet, Take 1 tablet by mouth Daily., Disp: 90 tablet, Rfl: 1    Cetirizine HCl (ZYRTEC ALLERGY PO), Take  by mouth Daily., Disp: , Rfl:     cyclobenzaprine (FLEXERIL) 10 MG tablet, TAKE 1 TABLET BY MOUTH AT NIGHT AS NEEDED FOR MUSCLE SPASM, Disp: 30 tablet, Rfl: 0    Diclofenac Sodium (VOLTAREN) 1 % gel gel, APPLY 4 GM TOPICALLY TO THE APPROPRIATE AREA AS DIRECTED 4 TIMES A DAY AS NEEDED (ARTHRITIS) FOR UP TO 30 DAYS. (Patient taking differently: 4 g As Needed.), Disp: 100 g, Rfl: 2    famotidine (PEPCID) 40 MG tablet, TAKE ONE TABLET BY MOUTH TWICE A DAY, Disp: 180 tablet, Rfl: 1    fluticasone (FLONASE) 50 MCG/ACT nasal spray, 2 sprays into the nostril(s) as directed by provider 2 (Two) Times a Day., Disp: 16 g, Rfl: 0    gabapentin (NEURONTIN) 100 MG capsule, Take 3 capsules by mouth Every Night., Disp: 90 capsule, Rfl: 0    Iron, Ferrous Sulfate, 325 (65 Fe) MG tablet, Take 325 mg by mouth Daily., Disp: 30 tablet, Rfl: 5    levothyroxine (SYNTHROID, LEVOTHROID) 50 MCG tablet, Take 1 tablet by mouth Daily., Disp: 90 tablet, Rfl: 1    meloxicam (MOBIC) 15 MG tablet, Take 1 tablet by mouth Daily As Needed for Moderate Pain., Disp: 90 tablet, Rfl: 1    ondansetron (ZOFRAN) 4 MG tablet, TAKE 1 TABLET EVERY 12 HOURS AS NEEDED FOR NAUSEA AND VOMITING, Disp: 60 tablet, Rfl: 0    pantoprazole (PROTONIX) 40 MG EC tablet, Take 1 tablet by mouth Daily., Disp: 90 tablet, Rfl: 1    PARoxetine (PAXIL) 40 MG tablet, Take 1 tablet by mouth Every Morning., Disp: 90 tablet, Rfl: 1    rOPINIRole (REQUIP) 0.25 MG tablet, Take 1 tablet by mouth Every Night. Take 1 hour before bedtime., Disp: 30 tablet, Rfl: 5    Vonoprazan Fumarate (Voquezna) 20 MG tablet, Take  by mouth., Disp: , Rfl:     busPIRone (BUSPAR) 5 MG tablet, Take 1  "tablet by mouth 2 (Two) Times a Day As Needed (anxiety)., Disp: 60 tablet, Rfl: 5    omeprazole (priLOSEC) 40 MG capsule, Take 1 capsule by mouth Daily., Disp: , Rfl:   Medications Discontinued During This Encounter   Medication Reason    loratadine (CLARITIN) 10 MG tablet Historical Med - Therapy completed    brompheniramine-pseudoephedrine-DM 30-2-10 MG/5ML syrup Historical Med - Therapy completed    promethazine-dextromethorphan (PROMETHAZINE-DM) 6.25-15 MG/5ML syrup Historical Med - Therapy completed    omeprazole (priLOSEC) 40 MG capsule Alternate therapy           Allergies:  Codeine, Hydrocodone, Hydroxyzine, Oxycodone-acetaminophen, Oxycodone, Vancomycin, and Sulfamethoxazole-trimethoprim      Objective   Vital Signs:   Vitals:    08/23/24 1008   BP: 136/82   BP Location: Right arm   Patient Position: Sitting   Cuff Size: Adult   Pulse: 75   Temp: 98.1 °F (36.7 °C)   TempSrc: Oral   SpO2: 95%   Weight: 70.9 kg (156 lb 3.2 oz)   Height: 165.1 cm (65\")     Body mass index is 25.99 kg/m².           Physical Exam  Vitals reviewed.   Constitutional:       General: She is not in acute distress.     Appearance: Normal appearance. She is well-developed.   HENT:      Head: Normocephalic and atraumatic.      Right Ear: External ear normal.      Left Ear: External ear normal.   Eyes:      Extraocular Movements: Extraocular movements intact.      Conjunctiva/sclera: Conjunctivae normal.      Pupils: Pupils are equal, round, and reactive to light.   Cardiovascular:      Rate and Rhythm: Normal rate and regular rhythm.      Heart sounds: No murmur heard.  Pulmonary:      Effort: Pulmonary effort is normal.      Breath sounds: Normal breath sounds. No wheezing, rhonchi or rales.   Abdominal:      General: Bowel sounds are normal. There is no distension.      Palpations: Abdomen is soft.      Tenderness: There is no abdominal tenderness.   Musculoskeletal:         General: Normal range of motion.   Neurological:      Mental " Status: She is alert.   Psychiatric:         Mood and Affect: Mood and affect normal.         Behavior: Behavior normal.         Thought Content: Thought content normal.         Judgment: Judgment normal.           Lab Results   Component Value Date    GLUCOSE 96 05/23/2024    BUN 13 05/23/2024    CREATININE 1.19 (H) 05/23/2024    EGFRIFNONA 60 01/12/2022    EGFRIFAFRI 69 01/12/2022    BCR 10.9 05/23/2024    K 4.1 05/23/2024    CO2 28.0 05/23/2024    CALCIUM 9.3 05/23/2024    PROTENTOTREF 6.6 01/12/2022    ALBUMIN 4.4 05/23/2024    LABIL2 1.3 11/25/2023    AST 21 05/23/2024    ALT 18 05/23/2024       Lab Results   Component Value Date    CHOL 159 05/23/2024    CHLPL 165 01/12/2022    TRIG 165 (H) 05/23/2024    HDL 44 05/23/2024    LDL 87 05/23/2024       Lab Results   Component Value Date    WBC 5.23 05/23/2024    HGB 11.0 (L) 05/23/2024    HCT 36.7 05/23/2024    MCV 92.2 05/23/2024     05/23/2024            Assessment and Plan    Assessment & Plan  Chronic cough  Chronic cough due to uncontrolled GERD.  She is following with Dr. Boo LAWSON who is managing.  She is currently on Voquezna and famotidine.  Dr. Haddad requested that she hold her omeprazole while she is on the Voquezna, which she has been doing.  However, she has been continuing to take pantoprazole.  I have requested that she call Dr. Haddad's office and see if he wants her to stop that, as I suspect he does not wish her to be on 2 PPIs.  Gastroesophageal reflux disease without esophagitis  As per chronic cough plan.  Essential hypertension  Blood pressure has been running at goal.  Not currently requiring meds. Labs were not needed today.  Continue to monitor.  Mixed hyperlipidemia   Stable on atorvastatin 40 mg daily.  Refills were not needed today.  Labs were not due today.  Continue to monitor.  Acquired hypothyroidism  Stable on levothyroxine 50 mcg daily.  Refills were not needed today.  Labs were not due today.  Continue to monitor.  Major  depressive disorder, recurrent episode, moderate degree  Adding buspirone 5 mg BID prn.  Stable on paroxetine 40 mg daily.  Refills were needed today.  Continue to monitor.  Generalized anxiety disorder    As per depression plan.   Pulmonary nodules  Pulmonary nodule seen on CT chest done 7/2023.  Rechecking for yearly monitoring.    Orders Placed This Encounter   Procedures    CT Chest Without Contrast Diagnostic       New Medications Ordered This Visit   Medications    busPIRone (BUSPAR) 5 MG tablet     Sig: Take 1 tablet by mouth 2 (Two) Times a Day As Needed (anxiety).     Dispense:  60 tablet     Refill:  5             Follow Up   Return in about 3 months (around 11/23/2024) for Recheck.  Patient was given instructions and counseling regarding her condition or for health maintenance advice. Please see specific information pulled into the AVS if appropriate.           05/23/2024

## 2024-08-23 NOTE — ASSESSMENT & PLAN NOTE
Adding buspirone 5 mg BID prn.  Stable on paroxetine 40 mg daily.  Refills were needed today.  Continue to monitor.

## 2024-08-23 NOTE — ASSESSMENT & PLAN NOTE
Blood pressure has been running at goal.  Not currently requiring meds. Labs were not needed today.  Continue to monitor.

## 2024-08-23 NOTE — ASSESSMENT & PLAN NOTE
Chronic cough due to uncontrolled GERD.  She is following with Dr. Boo LAWSON who is managing.  She is currently on Voquezna and famotidine.  Dr. Haddad requested that she hold her omeprazole while she is on the Voquezna, which she has been doing.  However, she has been continuing to take pantoprazole.  I have requested that she call Dr. Haddad's office and see if he wants her to stop that, as I suspect he does not wish her to be on 2 PPIs.

## 2024-09-10 ENCOUNTER — HOSPITAL ENCOUNTER (OUTPATIENT)
Dept: CT IMAGING | Facility: HOSPITAL | Age: 74
Discharge: HOME | End: 2024-09-10
Admitting: FAMILY MEDICINE
Payer: MEDICARE

## 2024-09-10 DIAGNOSIS — R91.8 PULMONARY NODULES: ICD-10-CM

## 2024-09-10 PROCEDURE — 71250 CT THORAX DX C-: CPT

## 2024-09-20 DIAGNOSIS — M17.0 PRIMARY OSTEOARTHRITIS OF BOTH KNEES: ICD-10-CM

## 2024-09-20 RX ORDER — MELOXICAM 15 MG/1
15 TABLET ORAL DAILY PRN
Qty: 90 TABLET | Refills: 1 | Status: SHIPPED | OUTPATIENT
Start: 2024-09-20

## 2024-10-02 DIAGNOSIS — M54.42 CHRONIC BILATERAL LOW BACK PAIN WITH BILATERAL SCIATICA: ICD-10-CM

## 2024-10-02 DIAGNOSIS — G89.29 CHRONIC BILATERAL LOW BACK PAIN WITH BILATERAL SCIATICA: ICD-10-CM

## 2024-10-02 DIAGNOSIS — M54.41 CHRONIC BILATERAL LOW BACK PAIN WITH BILATERAL SCIATICA: ICD-10-CM

## 2024-10-03 RX ORDER — OMEPRAZOLE 40 MG/1
40 CAPSULE, DELAYED RELEASE ORAL DAILY
Status: CANCELLED | OUTPATIENT
Start: 2024-10-03

## 2024-10-03 RX ORDER — GABAPENTIN 100 MG/1
300 CAPSULE ORAL NIGHTLY
Qty: 270 CAPSULE | Refills: 0 | Status: SHIPPED | OUTPATIENT
Start: 2024-10-03

## 2024-10-03 NOTE — TELEPHONE ENCOUNTER
Left detailed message on VM that Patient will need to call Dr Randall's office for him to refill this med until he can find a med that is covered on her insurance.  OK FOR HUB TO RELAY

## 2024-10-03 NOTE — TELEPHONE ENCOUNTER
Caller: Luisa Padilal    Relationship: Self    Best call back number: [phone]     What medication are you requesting: omeprazole (priLOSEC) 40 MG capsule     What are your current symptoms: STOMACH ISSUES        If a prescription is needed, what is your preferred pharmacy and phone number: [facility] [address] [phone] [facility] [phone]      Additional notes:PATIENT STATES SHE NEEDS THIS MEDICATION CALLED IN. PATIENT STATES DR RICO REMOVED THIS MEDICATION DUE TO SHE WAS GOING TO TRY ANOTHER, BUT INSURANCE WONT COVER THAT. PATIENT WILL NEED TO STAY ON THIS MEDICATION UNTIL SHE IS ABLE TO SEE DR RICO ON 10.30.24.     PLEASE CALL TO ADVISE EITHER WAY.

## 2024-10-04 DIAGNOSIS — K21.9 GASTROESOPHAGEAL REFLUX DISEASE WITHOUT ESOPHAGITIS: ICD-10-CM

## 2024-10-04 DIAGNOSIS — G62.9 PERIPHERAL POLYNEUROPATHY: ICD-10-CM

## 2024-10-04 DIAGNOSIS — E78.2 MIXED HYPERLIPIDEMIA: ICD-10-CM

## 2024-10-04 RX ORDER — ROPINIROLE 0.25 MG/1
0.25 TABLET, FILM COATED ORAL NIGHTLY
Qty: 30 TABLET | Refills: 5 | Status: SHIPPED | OUTPATIENT
Start: 2024-10-04

## 2024-10-04 RX ORDER — ATORVASTATIN CALCIUM 40 MG/1
40 TABLET, FILM COATED ORAL DAILY
Qty: 90 TABLET | Refills: 1 | Status: SHIPPED | OUTPATIENT
Start: 2024-10-04

## 2024-10-04 RX ORDER — PANTOPRAZOLE SODIUM 40 MG/1
40 TABLET, DELAYED RELEASE ORAL DAILY
Qty: 90 TABLET | Refills: 1 | Status: SHIPPED | OUTPATIENT
Start: 2024-10-04

## 2024-10-17 ENCOUNTER — OFFICE VISIT (OUTPATIENT)
Dept: FAMILY MEDICINE CLINIC | Age: 74
End: 2024-10-17
Payer: MEDICARE

## 2024-10-17 VITALS
DIASTOLIC BLOOD PRESSURE: 70 MMHG | WEIGHT: 165 LBS | OXYGEN SATURATION: 97 % | BODY MASS INDEX: 27.49 KG/M2 | TEMPERATURE: 97.8 F | SYSTOLIC BLOOD PRESSURE: 143 MMHG | HEIGHT: 65 IN | HEART RATE: 80 BPM

## 2024-10-17 DIAGNOSIS — R51.9 INTRACTABLE HEADACHE, UNSPECIFIED CHRONICITY PATTERN, UNSPECIFIED HEADACHE TYPE: Primary | ICD-10-CM

## 2024-10-17 DIAGNOSIS — R42 DIZZINESS: ICD-10-CM

## 2024-10-17 PROCEDURE — 99214 OFFICE O/P EST MOD 30 MIN: CPT | Performed by: NURSE PRACTITIONER

## 2024-10-17 PROCEDURE — 1159F MED LIST DOCD IN RCRD: CPT | Performed by: NURSE PRACTITIONER

## 2024-10-17 PROCEDURE — 1125F AMNT PAIN NOTED PAIN PRSNT: CPT | Performed by: NURSE PRACTITIONER

## 2024-10-17 PROCEDURE — 3077F SYST BP >= 140 MM HG: CPT | Performed by: NURSE PRACTITIONER

## 2024-10-17 PROCEDURE — 1160F RVW MEDS BY RX/DR IN RCRD: CPT | Performed by: NURSE PRACTITIONER

## 2024-10-17 PROCEDURE — 3078F DIAST BP <80 MM HG: CPT | Performed by: NURSE PRACTITIONER

## 2024-10-17 RX ORDER — RIZATRIPTAN BENZOATE 5 MG/1
5 TABLET ORAL ONCE AS NEEDED
Qty: 18 TABLET | Refills: 0 | Status: SHIPPED | OUTPATIENT
Start: 2024-10-17

## 2024-10-17 RX ORDER — MECLIZINE HYDROCHLORIDE 25 MG/1
25 TABLET ORAL 3 TIMES DAILY PRN
Qty: 30 TABLET | Refills: 0 | Status: SHIPPED | OUTPATIENT
Start: 2024-10-17

## 2024-10-17 NOTE — PROGRESS NOTES
Chief Complaint  Luisa Padilla presents to [facility] for Headache (Headaches, nausea, dizzy, X couple weeks )    Subjective          History of Present Illness    Luisa is here today with c/o headaches, nausea, dizziness. Reports that headaches started about one year ago but worsening and occurring daily now. She has been taking Tylenol and Ibuprofen daily for the past 6 months. She reports that headaches are all over. She denies vision loss. She denies any sinus symptoms but did start taking Cassidy Molt plus about 3 days ago. Dizziness started last week. Bending over seems to make the dizziness worse as well as changing positions. She reports that she saw PT last year for vertigo but later today that she did not have vertigo. She has nausea with the dizziness. She had normal CT head 7/6/23.    Review of Systems      Allergies   Allergen Reactions    Codeine Nausea And Vomiting    Hydrocodone Nausea And Vomiting    Hydroxyzine Other (See Comments)     Blurred vision    Oxycodone-Acetaminophen Nausea Only    Oxycodone Unknown - High Severity     Other reaction(s): Nausea Only   Other reaction(s): Nausea Only   <paragraph>Other reaction(s): Nausea Only</paragraph>    Vancomycin Itching     Pt. Complained of itching and flushed    Sulfamethoxazole-Trimethoprim Rash      Past Medical History:   Diagnosis Date    Abnormal findings on diagnostic imaging of other parts of digestive tract     Acute vaginitis     Allergy     CODEINE SULFATE,OXYCODONE/ACETAMINOP,HYDROXYZINE HCL   MODERATE    Anxiety and depression     Anxiety disorder, unspecified     AR (allergic rhinitis)     Chronic pain     Depression     Essential (primary) hypertension     GERD (gastroesophageal reflux disease)     History of back surgery     3 herniated disc (1 ruptured) - fusion    History of COVID-19 2021    History of hemoptysis     APPROX 30 YRS AGO    Hyperlipidemia     Hypothyroidism     IBS (irritable  bowel syndrome)     WITHOUT DIARRHEA    Insomnia     Low back pain     Osteoarthritis     Osteopenia     Other disturbances of smell and taste 2021    Other long term (current) drug therapy     Other somatoform disorders     Pain in left shoulder     Pain in throat     PONV (postoperative nausea and vomiting)     Primary insomnia     Primary osteoarthritis, left hand     Primary osteoarthritis, right hand     Right hip pain     THR - 2020    Sciatica, right side     Sleep apnea     DOES NOT WEAR MACHINE    Spider bite 11/18/2023    LEFT INDEX FINGER / DR. HAGER NOTIFIED 11-28-23    Unilateral primary osteoarthritis, right hip     Unspecified disorder of nose and nasal sinuses      Current Outpatient Medications   Medication Sig Dispense Refill    acetaminophen (TYLENOL) 650 MG 8 hr tablet Take 1 tablet by mouth Every 8 (Eight) Hours As Needed for Mild Pain.      atorvastatin (LIPITOR) 40 MG tablet TAKE ONE TABLET BY MOUTH EVERY DAY 90 tablet 1    Cetirizine HCl (ZYRTEC ALLERGY PO) Take  by mouth Daily.      cyclobenzaprine (FLEXERIL) 10 MG tablet TAKE 1 TABLET BY MOUTH AT NIGHT AS NEEDED FOR MUSCLE SPASM 30 tablet 0    Diclofenac Sodium (VOLTAREN) 1 % gel gel APPLY 4 GM TOPICALLY TO THE APPROPRIATE AREA AS DIRECTED 4 TIMES A DAY AS NEEDED (ARTHRITIS) FOR UP TO 30 DAYS. (Patient taking differently: 4 g As Needed.) 100 g 2    famotidine (PEPCID) 40 MG tablet TAKE ONE TABLET BY MOUTH TWICE A  tablet 1    gabapentin (NEURONTIN) 100 MG capsule Take 3 capsules by mouth Every Night. 270 capsule 0    Iron, Ferrous Sulfate, 325 (65 Fe) MG tablet Take 325 mg by mouth Daily. 30 tablet 5    levothyroxine (SYNTHROID, LEVOTHROID) 50 MCG tablet Take 1 tablet by mouth Daily. 90 tablet 1    meloxicam (MOBIC) 15 MG tablet Take 1 tablet by mouth Daily As Needed for Moderate Pain. 90 tablet 1    ondansetron (ZOFRAN) 4 MG tablet TAKE 1 TABLET EVERY 12 HOURS AS NEEDED FOR NAUSEA AND VOMITING 60 tablet 0    pantoprazole  (PROTONIX) 40 MG EC tablet TAKE ONE TABLET BY MOUTH EVERY DAY 90 tablet 1    PARoxetine (PAXIL) 40 MG tablet Take 1 tablet by mouth Every Morning. 90 tablet 1    rOPINIRole (REQUIP) 0.25 MG tablet TAKE 1 TABLET BY MOUTH EVERY NIGHT 1 HOUR BEFORE BEDTIME. 30 tablet 5    meclizine (ANTIVERT) 25 MG tablet Take 1 tablet by mouth 3 (Three) Times a Day As Needed for Dizziness. 30 tablet 0    rizatriptan (MAXALT) 5 MG tablet Take 1 tablet by mouth 1 (One) Time As Needed for Migraine for up to 1 dose. May repeat in 2 hours if needed 18 tablet 0     No current facility-administered medications for this visit.     Past Surgical History:   Procedure Laterality Date    BREAST AUGMENTATION Bilateral     CHOLECYSTECTOMY      COLONOSCOPY  2022    ENDOSCOPY      HYSTERECTOMY      INCISION AND DRAINAGE ABSCESS Left 2023    SPIDER BITE LT INDEX FINGER    JOINT REPLACEMENT Right 2020    BALJINDER    LUMBAR DISC SURGERY      LUMBAR DISCECTOMY      L5-S1    TOTAL HIP ARTHROPLASTY Right 2020    Procedure: RIGHT TOTAL HIP ARTHROPLASTY;  Surgeon: Iker Cisneros MD;  Location: [facility];  Service: Orthopedics;  Laterality: Right;      Social History     Tobacco Use    Smoking status: Former     Current packs/day: 0.00     Average packs/day: 3.0 packs/day for 30.0 years (90.0 ttl pk-yrs)     Types: Cigarettes     Start date:      Quit date:      Years since quittin.8     Passive exposure: Never    Smokeless tobacco: Never   Vaping Use    Vaping status: Never Used   Substance Use Topics    Alcohol use: Not Currently     Comment: socially    Drug use: Never     Family History   Problem Relation Age of Onset    Arthritis Mother     Breast cancer Mother     COPD Sister     Arthritis Maternal Grandmother     Cancer Other         Breast    Malig Hyperthermia Neg Hx      There are no preventive care reminders to display for this patient.   Immunization History   Administered Date(s) Administered    COVID-19  "(PFIZER) BIVALENT 12+YRS 02/07/2023    COVID-19 (PFIZER) Purple Cap Monovalent 03/22/2021, 04/12/2021    Influenza, Unspecified 09/09/2017    Pneumococcal Conjugate 20-Valent (PCV20) 09/29/2022    Tdap 11/24/2023        Objective     Vitals:    10/17/24 1321 10/17/24 1323   BP: 145/72 143/70   Pulse: 81 80   Temp: 97.8 °F (36.6 °C)    TempSrc: Oral    SpO2: 97%    Weight: 74.8 kg (165 lb)    Height: 165.1 cm (65\")      Body mass index is 27.46 kg/m².                No results found.    Physical Exam  Vitals reviewed.   Constitutional:       General: She is not in acute distress.     Appearance: Normal appearance. She is well-developed.   HENT:      Head: Normocephalic and atraumatic.      Right Ear: Tympanic membrane and ear canal normal.      Left Ear: Tympanic membrane and ear canal normal.      Mouth/Throat:      Mouth: Mucous membranes are moist.   Eyes:      Extraocular Movements: Extraocular movements intact.      Pupils: Pupils are equal, round, and reactive to light.   Cardiovascular:      Rate and Rhythm: Normal rate and regular rhythm.   Pulmonary:      Effort: Pulmonary effort is normal.      Breath sounds: Normal breath sounds.   Neurological:      General: No focal deficit present.      Mental Status: She is alert and oriented to person, place, and time.      Comments: Normal [place]   Psychiatric:         Mood and Affect: Mood and affect normal.           Result Review :                               Assessment and Plan      Assessment & Plan  Intractable headache, unspecified chronicity pattern, unspecified headache type  Discussed that symptoms of headache and dizziness could be from migraine headache. Discussed that daily use of Tylenol and Ibuprofen may be contributing to rebound headache. She will trial triptan for migraines. Will also give her a supply of meclizine that she can take for dizziness. Advised that medication may cause drowsiness. Will defer imaging at this time as she had normal " CT last year and has no acute neurological findings on exam today. Should be seen if symptoms persist or worsen.   Dizziness       New Medications Ordered This Visit   Medications    rizatriptan (MAXALT) 5 MG tablet     Sig: Take 1 tablet by mouth 1 (One) Time As Needed for Migraine for up to 1 dose. May repeat in 2 hours if needed     Dispense:  18 tablet     Refill:  0    meclizine (ANTIVERT) 25 MG tablet     Sig: Take 1 tablet by mouth 3 (Three) Times a Day As Needed for Dizziness.     Dispense:  30 tablet     Refill:  0                 Follow Up     Return for Next scheduled follow up, with PCP, Or sooner as needed.

## 2024-10-30 ENCOUNTER — OFFICE (OUTPATIENT)
Age: 74
End: 2024-10-30

## 2024-10-30 ENCOUNTER — OFFICE (OUTPATIENT)
Dept: URBAN - METROPOLITAN AREA CLINIC 76 | Facility: CLINIC | Age: 74
End: 2024-10-30
Payer: MEDICARE

## 2024-10-30 VITALS
HEART RATE: 69 BPM | OXYGEN SATURATION: 97 % | HEIGHT: 66 IN | WEIGHT: 165 LBS | SYSTOLIC BLOOD PRESSURE: 122 MMHG | OXYGEN SATURATION: 97 % | HEART RATE: 69 BPM | SYSTOLIC BLOOD PRESSURE: 122 MMHG | DIASTOLIC BLOOD PRESSURE: 80 MMHG | DIASTOLIC BLOOD PRESSURE: 80 MMHG | WEIGHT: 165 LBS | HEIGHT: 66 IN

## 2024-10-30 DIAGNOSIS — K21.9 GASTRO-ESOPHAGEAL REFLUX DISEASE WITHOUT ESOPHAGITIS: ICD-10-CM

## 2024-10-30 PROCEDURE — 99214 OFFICE O/P EST MOD 30 MIN: CPT | Performed by: INTERNAL MEDICINE

## 2024-10-30 RX ORDER — DEXLANSOPRAZOLE 60 MG/1
60 CAPSULE, DELAYED RELEASE ORAL
Qty: 30 | Refills: 12 | Status: ACTIVE
Start: 2024-10-30

## 2024-11-13 DIAGNOSIS — F33.1 MAJOR DEPRESSIVE DISORDER, RECURRENT EPISODE, MODERATE DEGREE: ICD-10-CM

## 2024-11-13 RX ORDER — PAROXETINE 40 MG/1
40 TABLET, FILM COATED ORAL EVERY MORNING
Qty: 90 TABLET | Refills: 1 | Status: SHIPPED | OUTPATIENT
Start: 2024-11-13

## 2025-01-13 DIAGNOSIS — R51.9 INTRACTABLE HEADACHE, UNSPECIFIED CHRONICITY PATTERN, UNSPECIFIED HEADACHE TYPE: ICD-10-CM

## 2025-01-13 DIAGNOSIS — R42 DIZZINESS: ICD-10-CM

## 2025-01-13 NOTE — TELEPHONE ENCOUNTER
Caller: Luisa Padilla    Relationship: Self    Best call back number:     [phone]     Requested Prescriptions:   Requested Prescriptions     Pending Prescriptions Disp Refills    rizatriptan (MAXALT) 5 MG tablet 18 tablet 0     Sig: Take 1 tablet by mouth 1 (One) Time As Needed for Migraine for up to 1 dose. May repeat in 2 hours if needed    meclizine (ANTIVERT) 25 MG tablet 30 tablet 0     Sig: Take 1 tablet by mouth 3 (Three) Times a Day As Needed for Dizziness.        Pharmacy where request should be sent: [place] [address] [facility] [phone] [facility] [phone] [facility]     Last office visit with prescribing clinician: 8/23/2024   Last telemedicine visit with prescribing clinician: Visit date not found   Next office visit with prescribing clinician: Visit date not found     Additional details provided by patient:     Does the patient have less than a 3 day supply:  [x] Yes  [] No    Would you like a call back once the refill request has been completed: [] Yes [] No    If the office needs to give you a call back, can they leave a voicemail: [] Yes [] No    Elbert Power Rep   01/13/25 11:50 EST

## 2025-01-14 RX ORDER — RIZATRIPTAN BENZOATE 5 MG/1
5 TABLET ORAL DAILY PRN
Qty: 9 TABLET | Refills: 5 | Status: SHIPPED | OUTPATIENT
Start: 2025-01-14

## 2025-01-14 RX ORDER — MECLIZINE HYDROCHLORIDE 25 MG/1
25 TABLET ORAL 3 TIMES DAILY PRN
Qty: 30 TABLET | Refills: 0 | Status: SHIPPED | OUTPATIENT
Start: 2025-01-14

## 2025-01-31 DIAGNOSIS — K21.9 GASTROESOPHAGEAL REFLUX DISEASE WITHOUT ESOPHAGITIS: ICD-10-CM

## 2025-01-31 RX ORDER — FAMOTIDINE 40 MG/1
40 TABLET, FILM COATED ORAL 2 TIMES DAILY
Qty: 180 TABLET | Refills: 1 | Status: SHIPPED | OUTPATIENT
Start: 2025-01-31

## 2025-02-04 DIAGNOSIS — M17.0 PRIMARY OSTEOARTHRITIS OF BOTH KNEES: ICD-10-CM

## 2025-02-04 RX ORDER — MELOXICAM 15 MG/1
TABLET ORAL
Qty: 90 TABLET | Refills: 1 | Status: SHIPPED | OUTPATIENT
Start: 2025-02-04

## 2025-02-07 RX ORDER — ONDANSETRON 4 MG/1
4 TABLET, FILM COATED ORAL EVERY 8 HOURS PRN
Qty: 60 TABLET | Refills: 0 | Status: SHIPPED | OUTPATIENT
Start: 2025-02-07

## 2025-02-07 RX ORDER — BUSPIRONE HYDROCHLORIDE 5 MG/1
5-10 TABLET ORAL 2 TIMES DAILY PRN
Qty: 45 TABLET | Refills: 0 | Status: SHIPPED | OUTPATIENT
Start: 2025-02-07

## 2025-02-07 NOTE — TELEPHONE ENCOUNTER
Luisa's daughter called to report that they just got some bad news a few minutes ago.  Martin had a really bad stroke and hospice will be called in.  She is requesting something for her nerves.  Also for her nausea.     Requested medication: Crestor  Last office visit/physical:  9/3/21  Last follow up/disposition: 1 year  Appointment scheduled (FP)?  No   Last refill:  1/10/22    Lab Results   Component Value Date    CHOLESTEROL 237 (H) 09/21/2021    HDL 52 09/21/2021    CALCLDL 158 (H) 09/21/2021    TRIGLYCERIDE 133 09/21/2021     Refilled per protocol

## 2025-02-07 NOTE — TELEPHONE ENCOUNTER
I am so, so sorry to hear that.  She has done well with buspirone in the past, so I have sent that in for her.  She can take 1 or 2 tablets twice daily.  We can even increase that a little bit more if needed.  I have also sent in the Zofran.  I will be thinking about their whole family.  Thanks, CHRISTEN

## 2025-02-12 DIAGNOSIS — R51.9 INTRACTABLE HEADACHE, UNSPECIFIED CHRONICITY PATTERN, UNSPECIFIED HEADACHE TYPE: ICD-10-CM

## 2025-02-13 RX ORDER — RIZATRIPTAN BENZOATE 5 MG/1
TABLET ORAL
Qty: 18 TABLET | Refills: 1 | Status: SHIPPED | OUTPATIENT
Start: 2025-02-13

## 2025-02-20 ENCOUNTER — OFFICE VISIT (OUTPATIENT)
Dept: FAMILY MEDICINE CLINIC | Age: 75
End: 2025-02-20
Payer: MEDICARE

## 2025-02-20 VITALS
OXYGEN SATURATION: 98 % | SYSTOLIC BLOOD PRESSURE: 167 MMHG | WEIGHT: 157.4 LBS | HEIGHT: 65 IN | BODY MASS INDEX: 26.23 KG/M2 | DIASTOLIC BLOOD PRESSURE: 80 MMHG | HEART RATE: 88 BPM | TEMPERATURE: 98.1 F

## 2025-02-20 DIAGNOSIS — F43.21 GRIEF REACTION: Primary | ICD-10-CM

## 2025-02-20 DIAGNOSIS — F41.1 GENERALIZED ANXIETY DISORDER: ICD-10-CM

## 2025-02-20 DIAGNOSIS — F33.1 MAJOR DEPRESSIVE DISORDER, RECURRENT EPISODE, MODERATE DEGREE: ICD-10-CM

## 2025-02-20 PROCEDURE — 3077F SYST BP >= 140 MM HG: CPT | Performed by: FAMILY MEDICINE

## 2025-02-20 PROCEDURE — 99214 OFFICE O/P EST MOD 30 MIN: CPT | Performed by: FAMILY MEDICINE

## 2025-02-20 PROCEDURE — 3079F DIAST BP 80-89 MM HG: CPT | Performed by: FAMILY MEDICINE

## 2025-02-20 PROCEDURE — 1125F AMNT PAIN NOTED PAIN PRSNT: CPT | Performed by: FAMILY MEDICINE

## 2025-02-20 RX ORDER — DEXLANSOPRAZOLE 60 MG/1
1 CAPSULE, DELAYED RELEASE ORAL DAILY
COMMUNITY
Start: 2024-11-01

## 2025-02-20 NOTE — PROGRESS NOTES
"Chief Complaint  Depression (6 week /Pt declines PHQ-2 and FER-7 questionnaire. )    35 minutes were spent in this encounter, including the face-to-face encounter, chart review, documentation, and coordination of care.    Sushma Padilla presents to [facility] today for routine f/u of chronic issues.    Since Luisa was last here, she has lost her beloved  Martin to a stroke.  She is on paroxetine 40 mg daily at baseline.  I did send in some buspirone for her about 2 weeks ago when the decision was made to call in hospice as she was really struggling with her nerves.  She also was endorsing a lot of nausea at that time for which I sent her in Zofran.    She feels dizzy and weak.  Her head feels \"crazy.\"  No diarrhea.  No chest pain or palpitations.  No shortness of breath.  She has stopped taking all of her routine meds cold turkey including paroxetine.  She has not been eating well despite using Zofran.        Current Outpatient Medications:     busPIRone (BUSPAR) 5 MG tablet, Take 1-2 tablets by mouth 2 (Two) Times a Day As Needed (anxiety)., Disp: 45 tablet, Rfl: 0    meclizine (ANTIVERT) 25 MG tablet, Take 1 tablet by mouth 3 (Three) Times a Day As Needed for Dizziness., Disp: 30 tablet, Rfl: 0    ondansetron (ZOFRAN) 4 MG tablet, Take 1 tablet by mouth Every 8 (Eight) Hours As Needed for Nausea or Vomiting., Disp: 60 tablet, Rfl: 0    acetaminophen (TYLENOL) 650 MG 8 hr tablet, Take 1 tablet by mouth Every 8 (Eight) Hours As Needed for Mild Pain. (Patient not taking: Reported on 2/20/2025), Disp: , Rfl:     atorvastatin (LIPITOR) 40 MG tablet, TAKE ONE TABLET BY MOUTH EVERY DAY (Patient not taking: Reported on 2/20/2025), Disp: 90 tablet, Rfl: 1    Cetirizine HCl (ZYRTEC ALLERGY PO), Take  by mouth Daily. (Patient not taking: Reported on 2/20/2025), Disp: , Rfl:     cyclobenzaprine (FLEXERIL) 10 MG tablet, TAKE 1 TABLET BY MOUTH AT NIGHT AS NEEDED FOR " MUSCLE SPASM (Patient not taking: Reported on 2/20/2025), Disp: 30 tablet, Rfl: 0    dexlansoprazole (DEXILANT) 60 MG capsule, Take 1 capsule by mouth Daily. (Patient not taking: Reported on 2/20/2025), Disp: , Rfl:     Diclofenac Sodium (VOLTAREN) 1 % gel gel, APPLY 4 GM TOPICALLY TO THE APPROPRIATE AREA AS DIRECTED 4 TIMES A DAY AS NEEDED (ARTHRITIS) FOR UP TO 30 DAYS. (Patient not taking: Reported on 2/20/2025), Disp: 100 g, Rfl: 2    famotidine (PEPCID) 40 MG tablet, TAKE ONE TABLET BY MOUTH TWICE A DAY (Patient not taking: Reported on 2/20/2025), Disp: 180 tablet, Rfl: 1    gabapentin (NEURONTIN) 100 MG capsule, Take 3 capsules by mouth Every Night. (Patient not taking: Reported on 2/20/2025), Disp: 270 capsule, Rfl: 0    Iron, Ferrous Sulfate, 325 (65 Fe) MG tablet, Take 325 mg by mouth Daily. (Patient not taking: Reported on 2/20/2025), Disp: 30 tablet, Rfl: 5    levothyroxine (SYNTHROID, LEVOTHROID) 50 MCG tablet, Take 1 tablet by mouth Daily. (Patient not taking: Reported on 2/20/2025), Disp: 90 tablet, Rfl: 1    meloxicam (MOBIC) 15 MG tablet, TAKE ONE TABLET BY MOUTH EVERY DAY AS NEEDED FOR MODERATE PAIN (Patient not taking: Reported on 2/20/2025), Disp: 90 tablet, Rfl: 1    pantoprazole (PROTONIX) 40 MG EC tablet, TAKE ONE TABLET BY MOUTH EVERY DAY (Patient not taking: Reported on 2/20/2025), Disp: 90 tablet, Rfl: 1    PARoxetine (PAXIL) 40 MG tablet, TAKE ONE TABLET BY MOUTH EVERY MORNING (Patient not taking: Reported on 2/20/2025), Disp: 90 tablet, Rfl: 1    rizatriptan (MAXALT) 5 MG tablet, TAKE ONE TABLET BY MOUTH ONE TIME AS NEEDED FOR MIGRAINE FOR UP TO 1 DOSE. MAY REPEAT IN 2 HOURS IF NEEDED. (Patient not taking: Reported on 2/20/2025), Disp: 18 tablet, Rfl: 1    rOPINIRole (REQUIP) 0.25 MG tablet, TAKE 1 TABLET BY MOUTH EVERY NIGHT 1 HOUR BEFORE BEDTIME. (Patient not taking: Reported on 2/20/2025), Disp: 30 tablet, Rfl: 5  There are no discontinued medications.      Allergies:  Codeine,  "Hydrocodone, Hydroxyzine, Oxycodone-acetaminophen, Oxycodone, Vancomycin, and Sulfamethoxazole-trimethoprim      Objective   Vital Signs:   Vitals:    02/20/25 1449   BP: 167/80   Pulse: 88   Temp: 98.1 °F (36.7 °C)   TempSrc: Oral   SpO2: 98%  Comment: room air   Weight: 71.4 kg (157 lb 6.4 oz)   Height: 165.1 cm (65\")     Body mass index is 26.19 kg/m².           Physical Exam  Constitutional:       Appearance: Normal appearance.   HENT:      Head: Normocephalic and atraumatic.   Eyes:      Extraocular Movements: Extraocular movements intact.      Conjunctiva/sclera: Conjunctivae normal.   Pulmonary:      Effort: Pulmonary effort is normal. No respiratory distress.   Musculoskeletal:         General: Normal range of motion.   Skin:     General: Skin is warm and dry.   Neurological:      General: No focal deficit present.      Mental Status: She is alert and oriented to person, place, and time.   Psychiatric:         Mood and Affect: Affect is tearful.         Behavior: Behavior normal.         Thought Content: Thought content normal.         Judgment: Judgment normal.      Comments: +depressed mood           Lab Results   Component Value Date    GLUCOSE 96 05/23/2024    BUN 13 05/23/2024    CREATININE 1.19 (H) 05/23/2024    EGFRIFNONA 60 01/12/2022    EGFRIFAFRI 69 01/12/2022    BCR 10.9 05/23/2024    K 4.1 05/23/2024    CO2 28.0 05/23/2024    CALCIUM 9.3 05/23/2024    ALBUMIN 4.4 05/23/2024    LABIL2 1.3 11/25/2023    AST 21 05/23/2024    ALT 18 05/23/2024       Lab Results   Component Value Date    CHOL 159 05/23/2024    CHLPL 165 01/12/2022    TRIG 165 (H) 05/23/2024    HDL 44 05/23/2024    LDL 87 05/23/2024       Lab Results   Component Value Date    WBC 5.23 05/23/2024    HGB 11.0 (L) 05/23/2024    HCT 36.7 05/23/2024    MCV 92.2 05/23/2024     05/23/2024            Assessment and Plan    Assessment & Plan  Grief reaction  Luisa has lost her  Martin to a series of medical problems ultimately " culminating in a stroke.  She is of course distraught over his loss.  She is struggling.  I did send in some buspirone for her about a week ago, but she has been feeling very dizzy and lightheaded and was worried that the buspirone might be responsible so she stopped that yesterday.  She does note today that she stopped all of her routine meds cold turkey, including paroxetine 40 mg daily.  I think it is very probable that at least some of her physical symptoms are coming from SSRI withdrawal, so we are going to start her back on the paroxetine today at her previous dose.  If she would prefer to hold off on the buspirone, I think that is fine, but she is still feeling the symptoms today despite having stopped the buspirone over 24 hours ago, and it is fairly short acting, so I would not expect to still be making her feel bad at this time.  Encouraged her to use her Zofran to be able to eat.  She does have good family and social support.  She will call to let me know if there is anything else I can do for her, or if she continues to experience symptoms despite going back on the paroxetine.  I will see her back certainly in 3 months and perhaps sooner depending on her needs.       Major depressive disorder, recurrent episode, moderate degree           Generalized anxiety disorder                     Follow Up   Return in about 3 months (around 5/20/2025) for Medicare Wellness.  Patient was given instructions and counseling regarding her condition or for health maintenance advice. Please see specific information pulled into the AVS if appropriate.           02/20/2025

## 2025-03-03 ENCOUNTER — TELEPHONE (OUTPATIENT)
Dept: FAMILY MEDICINE CLINIC | Age: 75
End: 2025-03-03
Payer: MEDICARE

## 2025-03-03 DIAGNOSIS — M25.511 ACUTE PAIN OF BOTH SHOULDERS: Primary | ICD-10-CM

## 2025-03-03 DIAGNOSIS — M25.512 ACUTE PAIN OF BOTH SHOULDERS: Primary | ICD-10-CM

## 2025-03-03 NOTE — TELEPHONE ENCOUNTER
Caller: Luisa Padilla    Relationship: Self    Best call back number:  [phone]    What is the medical concern/diagnosis: PAIN FROM SHOULDERS ON DOWN, PULLED MUSCLE ON RIGHT SIDE     What specialty or service is being requested: PHYSICAL THERAPY     What is the provider, practice or medical service name: [facility]  DR. ROQUE     What is the office location: [address] [place] [address] [address], [place] [address]    What is the office phone number: [phone]    Any additional details: PLEASE NOTIFY WHEN REFERRAL HAS BEEN SENT

## 2025-03-03 NOTE — TELEPHONE ENCOUNTER
Left message to return call.  Patient gets her mammo at [place].  Calling to see if she already has it scheduled or if this needs to be done.

## 2025-03-03 NOTE — TELEPHONE ENCOUNTER
Spoke to Patient and she does not want to do this at this time.  She wants to push it out a couple of months.  Recall placed.

## 2025-03-03 NOTE — TELEPHONE ENCOUNTER
----- Message from Kari Harris sent at 3/1/2024  3:12 PM EST -----  Regarding: f/u screening mammo  Please call pt to let her know that she is due for her yearly screening mammogram.  Please pend order for screening mammo and send to me.  Thanks, CHRISTEN

## 2025-03-05 DIAGNOSIS — M54.41 CHRONIC BILATERAL LOW BACK PAIN WITH BILATERAL SCIATICA: ICD-10-CM

## 2025-03-05 DIAGNOSIS — M54.42 CHRONIC BILATERAL LOW BACK PAIN WITH BILATERAL SCIATICA: ICD-10-CM

## 2025-03-05 DIAGNOSIS — G89.29 CHRONIC BILATERAL LOW BACK PAIN WITH BILATERAL SCIATICA: ICD-10-CM

## 2025-03-05 RX ORDER — CYCLOBENZAPRINE HCL 10 MG
10 TABLET ORAL NIGHTLY PRN
Qty: 30 TABLET | Refills: 0 | Status: SHIPPED | OUTPATIENT
Start: 2025-03-05

## 2025-03-23 DIAGNOSIS — K21.9 GASTROESOPHAGEAL REFLUX DISEASE WITHOUT ESOPHAGITIS: ICD-10-CM

## 2025-03-23 DIAGNOSIS — G62.9 PERIPHERAL POLYNEUROPATHY: ICD-10-CM

## 2025-03-23 DIAGNOSIS — E78.2 MIXED HYPERLIPIDEMIA: ICD-10-CM

## 2025-03-24 ENCOUNTER — TELEPHONE (OUTPATIENT)
Dept: FAMILY MEDICINE CLINIC | Age: 75
End: 2025-03-24
Payer: MEDICARE

## 2025-03-24 DIAGNOSIS — M19.042 PRIMARY OSTEOARTHRITIS OF HANDS, BILATERAL: ICD-10-CM

## 2025-03-24 DIAGNOSIS — M19.041 PRIMARY OSTEOARTHRITIS OF HANDS, BILATERAL: ICD-10-CM

## 2025-03-24 DIAGNOSIS — M17.0 PRIMARY OSTEOARTHRITIS OF BOTH KNEES: Primary | ICD-10-CM

## 2025-03-24 NOTE — TELEPHONE ENCOUNTER
Caller: Luisa Padilla    Relationship: Self    Best call back number: [phone]    Which medication are you concerned about: meloxicam (MOBIC) 15 MG tablet     Who prescribed you this medication: MD CABRERA     When did you start taking this medication: YEARS     What are your concerns: IT ISN'T HELPING ARTHRITIS PAIN, PATIENT WOULD LIKE TO DISCUSS SWITCHING MEDICATION TO DICLOFENAC IF POSSIBLE.     How long have you had these concerns: A MONTH

## 2025-03-26 RX ORDER — ROPINIROLE 0.25 MG/1
0.25 TABLET, FILM COATED ORAL NIGHTLY
Qty: 90 TABLET | Refills: 1 | Status: SHIPPED | OUTPATIENT
Start: 2025-03-26

## 2025-03-26 RX ORDER — ATORVASTATIN CALCIUM 40 MG/1
40 TABLET, FILM COATED ORAL DAILY
Qty: 90 TABLET | Refills: 1 | Status: SHIPPED | OUTPATIENT
Start: 2025-03-26

## 2025-03-26 RX ORDER — PANTOPRAZOLE SODIUM 40 MG/1
40 TABLET, DELAYED RELEASE ORAL DAILY
Qty: 90 TABLET | Refills: 1 | Status: SHIPPED | OUTPATIENT
Start: 2025-03-26

## 2025-03-26 RX ORDER — DICLOFENAC SODIUM 75 MG/1
75 TABLET, DELAYED RELEASE ORAL 2 TIMES DAILY PRN
Qty: 60 TABLET | Refills: 2 | Status: SHIPPED | OUTPATIENT
Start: 2025-03-26

## 2025-03-26 NOTE — TELEPHONE ENCOUNTER
Yes, we can switch her over to diclofenac.  Prescription sent as below.  Please stop meloxicam if she is still taking it.  Take diclofenac with food.  Thanks, BZALLYSON

## 2025-03-27 ENCOUNTER — TELEPHONE (OUTPATIENT)
Dept: FAMILY MEDICINE CLINIC | Age: 75
End: 2025-03-27
Payer: MEDICARE

## 2025-03-27 DIAGNOSIS — F33.1 MAJOR DEPRESSIVE DISORDER, RECURRENT EPISODE, MODERATE DEGREE: ICD-10-CM

## 2025-03-27 RX ORDER — ARIPIPRAZOLE 2 MG/1
2 TABLET ORAL DAILY
Qty: 30 TABLET | Refills: 5 | Status: SHIPPED | OUTPATIENT
Start: 2025-03-27

## 2025-03-27 NOTE — TELEPHONE ENCOUNTER
Caller: Luisa Padilla    Relationship: Self    Best call back number: [phone]    What medication are you requesting: PARoxetine (PAXIL) 40 MG tablet  STRONGER DOSAGE OF THIS MEDICATION OR SOMETHING STRONGER    What are your current symptoms: N/A    How long have you been experiencing symptoms: N/A    Have you had these symptoms before:    [] Yes  [] No    Have you been treated for these symptoms before:   [] Yes  [] No    If a prescription is needed, what is your preferred pharmacy and phone number: [facility] [address] [place], [place] - [address] [facility] [phone] [facility] [phone]      Additional notes:PATIENT SAYS HER CURRENT DEPRESSION MEDICATION IS NOT WORKING FOR HER, HER SYMPTOMS ARE GETTING WORSE EVERY DAY. PLEASE EITHER SEND A STRONGER DOSAGE OF WHAT SHE IS TAKING OR PRESCRIBE A SIMILAR MEDICATION THAT IS STRONGER. A NEW MEDICATION. THANK YOU VERY MUCH.

## 2025-03-27 NOTE — TELEPHONE ENCOUNTER
40 mg is the highest strength that the paroxetine comes in, but we could certainly add in some Abilify 2 mg nightly and for her to take in addition to the paroxetine to try to boost the effect that it is giving her.  Abilify is a boosting agent that we use with other antidepressants and should hopefully help her.  I will send in that prescription.    It sounds like maybe we should plan to get Luisa back into see me before July if she is having so much trouble.  Could we see about getting her an appointment in 4 to 6 weeks so we can follow-up on how she is doing with the new addition of Abilify?  Thanks, CHRISTEN

## 2025-03-31 ENCOUNTER — TELEPHONE (OUTPATIENT)
Dept: FAMILY MEDICINE CLINIC | Age: 75
End: 2025-03-31
Payer: MEDICARE

## 2025-03-31 DIAGNOSIS — M79.674 PAIN OF TOE OF RIGHT FOOT: Primary | ICD-10-CM

## 2025-03-31 NOTE — TELEPHONE ENCOUNTER
Patient states she has a callous on her Right big toe and would like to see Su/Naomie.  Please sign referral.

## 2025-04-02 DIAGNOSIS — G89.29 CHRONIC BILATERAL LOW BACK PAIN WITH BILATERAL SCIATICA: ICD-10-CM

## 2025-04-02 DIAGNOSIS — M54.41 CHRONIC BILATERAL LOW BACK PAIN WITH BILATERAL SCIATICA: ICD-10-CM

## 2025-04-02 DIAGNOSIS — M54.42 CHRONIC BILATERAL LOW BACK PAIN WITH BILATERAL SCIATICA: ICD-10-CM

## 2025-04-03 RX ORDER — GABAPENTIN 100 MG/1
300 CAPSULE ORAL NIGHTLY
Qty: 270 CAPSULE | Refills: 0 | Status: SHIPPED | OUTPATIENT
Start: 2025-04-03

## 2025-04-16 ENCOUNTER — OFFICE VISIT (OUTPATIENT)
Dept: FAMILY MEDICINE CLINIC | Age: 75
End: 2025-04-16
Payer: MEDICARE

## 2025-04-16 VITALS
DIASTOLIC BLOOD PRESSURE: 101 MMHG | WEIGHT: 157.2 LBS | OXYGEN SATURATION: 98 % | BODY MASS INDEX: 26.19 KG/M2 | SYSTOLIC BLOOD PRESSURE: 192 MMHG | HEIGHT: 65 IN | HEART RATE: 84 BPM | TEMPERATURE: 98.1 F

## 2025-04-16 DIAGNOSIS — K21.9 GASTROESOPHAGEAL REFLUX DISEASE WITHOUT ESOPHAGITIS: ICD-10-CM

## 2025-04-16 DIAGNOSIS — F33.1 MAJOR DEPRESSIVE DISORDER, RECURRENT EPISODE, MODERATE DEGREE: ICD-10-CM

## 2025-04-16 DIAGNOSIS — F41.1 GENERALIZED ANXIETY DISORDER: ICD-10-CM

## 2025-04-16 DIAGNOSIS — F43.20 GRIEF REACTION: Primary | ICD-10-CM

## 2025-04-16 RX ORDER — IBUPROFEN 200 MG
200 TABLET ORAL EVERY 6 HOURS PRN
COMMUNITY

## 2025-04-16 RX ORDER — TRAZODONE HYDROCHLORIDE 50 MG/1
50 TABLET ORAL NIGHTLY PRN
Qty: 30 TABLET | Refills: 5 | Status: SHIPPED | OUTPATIENT
Start: 2025-04-16

## 2025-04-16 RX ORDER — DEXLANSOPRAZOLE 60 MG/1
60 CAPSULE, DELAYED RELEASE ORAL DAILY
COMMUNITY
End: 2025-04-16

## 2025-04-16 RX ORDER — OMEPRAZOLE 40 MG/1
40 CAPSULE, DELAYED RELEASE ORAL DAILY
Qty: 90 CAPSULE | Refills: 1 | Status: SHIPPED | OUTPATIENT
Start: 2025-04-16

## 2025-04-16 NOTE — PROGRESS NOTES
"Chief Complaint  Depression      Subjective          Luisa Padilla presents to [facility] today for f/u on depression.    Luisa continues to grieve the loss of her beloved  Martin to a stroke.  We have had her on paroxetine 40 mg daily and buspirone 5 to 10 mg twice daily as needed.  At her last visit, we added an Abilify 2 mg daily to try to help with her grief reaction.  At that last visit, she had stopped her paroxetine which seem to be causing some withdrawal symptoms as well.    Today, she is still having a lot of trouble sleeping.  She feels \"wired up.\"  Has been taking the Abilify at night and sleep seems worse since she started this 2 weeks ago.  She is using the paroxetine daily.  Taking the buspirone sometimes but not every day.  She was contacted by Hospice to see if she wanted grief counseling services but she declined.        Current Outpatient Medications:     ARIPiprazole (Abilify) 2 MG tablet, Take 1 tablet by mouth Daily., Disp: 30 tablet, Rfl: 5    busPIRone (BUSPAR) 5 MG tablet, Take 1-2 tablets by mouth 2 (Two) Times a Day As Needed (anxiety)., Disp: 45 tablet, Rfl: 0    Cetirizine HCl (ZYRTEC ALLERGY PO), Take  by mouth Daily As Needed., Disp: , Rfl:     cyclobenzaprine (FLEXERIL) 10 MG tablet, TAKE 1 TABLET BY MOUTH AT NIGHT AS NEEDED FOR MUSCLE SPASM, Disp: 30 tablet, Rfl: 0    gabapentin (NEURONTIN) 100 MG capsule, Take 3 capsules by mouth Every Night., Disp: 270 capsule, Rfl: 0    ibuprofen (ADVIL,MOTRIN) 200 MG tablet, Take 1 tablet by mouth Every 6 (Six) Hours As Needed for Mild Pain., Disp: , Rfl:     ondansetron (ZOFRAN) 4 MG tablet, Take 1 tablet by mouth Every 8 (Eight) Hours As Needed for Nausea or Vomiting., Disp: 60 tablet, Rfl: 0    PARoxetine (PAXIL) 40 MG tablet, TAKE ONE TABLET BY MOUTH EVERY MORNING, Disp: 90 tablet, Rfl: 1    rizatriptan (MAXALT) 5 MG tablet, TAKE ONE TABLET BY MOUTH ONE TIME AS NEEDED FOR MIGRAINE FOR UP TO 1 DOSE. " MAY REPEAT IN 2 HOURS IF NEEDED., Disp: 18 tablet, Rfl: 1    acetaminophen (TYLENOL) 650 MG 8 hr tablet, Take 1 tablet by mouth Every 8 (Eight) Hours As Needed for Mild Pain. (Patient not taking: Reported on 4/16/2025), Disp: , Rfl:     atorvastatin (LIPITOR) 40 MG tablet, TAKE ONE TABLET BY MOUTH EVERY DAY (Patient not taking: Reported on 4/16/2025), Disp: 90 tablet, Rfl: 1    diclofenac (VOLTAREN) 75 MG EC tablet, Take 1 tablet by mouth 2 (Two) Times a Day As Needed (pain). (Patient not taking: Reported on 4/16/2025), Disp: 60 tablet, Rfl: 2    Diclofenac Sodium (VOLTAREN) 1 % gel gel, APPLY 4 GM TOPICALLY TO THE APPROPRIATE AREA AS DIRECTED 4 TIMES A DAY AS NEEDED (ARTHRITIS) FOR UP TO 30 DAYS. (Patient not taking: Reported on 4/16/2025), Disp: 100 g, Rfl: 2    famotidine (PEPCID) 40 MG tablet, TAKE ONE TABLET BY MOUTH TWICE A DAY (Patient not taking: Reported on 4/16/2025), Disp: 180 tablet, Rfl: 1    Iron, Ferrous Sulfate, 325 (65 Fe) MG tablet, Take 325 mg by mouth Daily. (Patient not taking: Reported on 4/16/2025), Disp: 30 tablet, Rfl: 5    levothyroxine (SYNTHROID, LEVOTHROID) 50 MCG tablet, Take 1 tablet by mouth Daily. (Patient not taking: Reported on 4/16/2025), Disp: 90 tablet, Rfl: 1    meclizine (ANTIVERT) 25 MG tablet, Take 1 tablet by mouth 3 (Three) Times a Day As Needed for Dizziness. (Patient not taking: Reported on 4/16/2025), Disp: 30 tablet, Rfl: 0    omeprazole (priLOSEC) 40 MG capsule, Take 1 capsule by mouth Daily., Disp: 90 capsule, Rfl: 1    rOPINIRole (REQUIP) 0.25 MG tablet, Take 1 tablet by mouth Every Night. (Patient not taking: Reported on 4/16/2025), Disp: 90 tablet, Rfl: 1    traZODone (DESYREL) 50 MG tablet, Take 1 tablet by mouth At Night As Needed for Sleep., Disp: 30 tablet, Rfl: 5  Medications Discontinued During This Encounter   Medication Reason    pantoprazole (PROTONIX) 40 MG EC tablet Historical Med - Therapy completed    dexlansoprazole (DEXILANT) 60 MG capsule  "Historical Med - Therapy completed         Allergies:  Codeine, Hydrocodone, Hydroxyzine, Oxycodone-acetaminophen, Oxycodone, Vancomycin, and Sulfamethoxazole-trimethoprim      Objective   Vital Signs:   Vitals:    04/16/25 0756   BP: 147/79   Pulse: 84   Temp: 98.1 °F (36.7 °C)   TempSrc: Oral   SpO2: 98%   Weight: 71.3 kg (157 lb 3.2 oz)   Height: 165.1 cm (65\")     Body mass index is 26.16 kg/m².           Physical Exam  Constitutional:       Appearance: Normal appearance.   HENT:      Head: Normocephalic and atraumatic.   Eyes:      Extraocular Movements: Extraocular movements intact.      Conjunctiva/sclera: Conjunctivae normal.   Pulmonary:      Effort: Pulmonary effort is normal. No respiratory distress.   Musculoskeletal:         General: Normal range of motion.   Skin:     General: Skin is warm and dry.   Neurological:      General: No focal deficit present.      Mental Status: She is alert and oriented to person, place, and time.   Psychiatric:         Mood and Affect: Mood normal. Affect is tearful.         Behavior: Behavior normal.         Thought Content: Thought content normal.         Judgment: Judgment normal.      Comments: +depressed mood           Lab Results   Component Value Date    GLUCOSE 96 05/23/2024    BUN 13 05/23/2024    CREATININE 1.19 (H) 05/23/2024    EGFRIFNONA 60 01/12/2022    EGFRIFAFRI 69 01/12/2022    BCR 10.9 05/23/2024    K 4.1 05/23/2024    CO2 28.0 05/23/2024    CALCIUM 9.3 05/23/2024    ALBUMIN 4.4 05/23/2024    LABIL2 1.3 11/25/2023    AST 21 05/23/2024    ALT 18 05/23/2024       Lab Results   Component Value Date    CHOL 159 05/23/2024    CHLPL 165 01/12/2022    TRIG 165 (H) 05/23/2024    HDL 44 05/23/2024    LDL 87 05/23/2024       Lab Results   Component Value Date    WBC 5.23 05/23/2024    HGB 11.0 (L) 05/23/2024    HCT 36.7 05/23/2024    MCV 92.2 05/23/2024     05/23/2024            Assessment and Plan    Assessment & Plan  Grief reaction  Lot of difficulty " coping and.  She has good family support.  We are going to continue her paroxetine 40 mg daily in the morning, move her aripiprazole 2 mg daily to morning as well as it may be affecting her sleep.  I would like her to start buspirone 5 mg every morning with a second dose to be taken later in the afternoon as needed.  I am also going to start her on some trazodone at bedtime.  I sent in for 50 mg nightly as needed but she can start with a half tab nightly if she wishes to see how it affects her.  I also strongly encouraged her to call back to hospice and discussed their grief counseling options further as this can be very helpful.  I think they do offer individual counseling in her situation since Martin was a hospice patient.  She is not interested in a group setting so much.  Orders:    traZODone (DESYREL) 50 MG tablet; Take 1 tablet by mouth At Night As Needed for Sleep.    Major depressive disorder, recurrent episode, moderate degree    As above.  Orders:    traZODone (DESYREL) 50 MG tablet; Take 1 tablet by mouth At Night As Needed for Sleep.    Generalized anxiety disorder    As above.  Orders:    traZODone (DESYREL) 50 MG tablet; Take 1 tablet by mouth At Night As Needed for Sleep.    Gastroesophageal reflux disease without esophagitis  She would like to switch back to omeprazole from Dexilant/pantoprazole as she feels this works best.  Rx sent.  She does follow with GI.  Orders:    omeprazole (priLOSEC) 40 MG capsule; Take 1 capsule by mouth Daily.              Follow Up   Return if symptoms worsen or fail to improve, for or as scheduled 7/3/2025.  Patient was given instructions and counseling regarding her condition or for health maintenance advice. Please see specific information pulled into the AVS if appropriate.           02/20/2025

## 2025-04-16 NOTE — ASSESSMENT & PLAN NOTE
She would like to switch back to omeprazole from Dexilant/pantoprazole as she feels this works best.  Rx sent.  She does follow with GI.  Orders:    omeprazole (priLOSEC) 40 MG capsule; Take 1 capsule by mouth Daily.

## 2025-04-16 NOTE — ASSESSMENT & PLAN NOTE
As above.  Orders:    traZODone (DESYREL) 50 MG tablet; Take 1 tablet by mouth At Night As Needed for Sleep.

## 2025-04-18 RX ORDER — BUSPIRONE HYDROCHLORIDE 5 MG/1
TABLET ORAL
Qty: 120 TABLET | Refills: 0 | Status: SHIPPED | OUTPATIENT
Start: 2025-04-18

## 2025-04-19 DIAGNOSIS — F33.1 MAJOR DEPRESSIVE DISORDER, RECURRENT EPISODE, MODERATE DEGREE: ICD-10-CM

## 2025-04-21 RX ORDER — PAROXETINE 40 MG/1
40 TABLET, FILM COATED ORAL EVERY MORNING
Qty: 90 TABLET | Refills: 1 | Status: SHIPPED | OUTPATIENT
Start: 2025-04-21

## 2025-04-28 ENCOUNTER — TELEPHONE (OUTPATIENT)
Dept: FAMILY MEDICINE CLINIC | Age: 75
End: 2025-04-28
Payer: MEDICARE

## 2025-04-28 NOTE — TELEPHONE ENCOUNTER
Caller: Luisa Padilla    Relationship: Self    Best call back number: [phone]    What is the best time to reach you: ANY    Who are you requesting to speak with (clinical staff, provider,  specific staff member): DR CABRERA    Do you know the name of the person who called: PATIENT    What was the call regarding: PATIENT WOULD LIKE TO LEAVE A MESSAGE WITH MARSHALL TO HAVE DR CABRERA CALL HER BACK TOMORROW.    Is it okay if the provider responds through MyChart: N/A

## 2025-04-29 NOTE — TELEPHONE ENCOUNTER
Spoke to Patient and she had not been taking her meds correctly.  She started taking the Abilify BID and got to where she could not function then she seen that she was only supposed to take it in the morning so she just stopped it all together.  She had not been taking the Trazodone because it made her sleepy.  When she stopped the Abilify all she can do is cry.  I informed her to take her meds as prescribed.  Paxil, Abilify and Buspar in the morning.  Buspar early afternoon if needed and then take the trazodone at night because it is used to help her sleep and see if that helped to stabilize her.  Patient verbalized understanding.

## 2025-05-05 ENCOUNTER — TELEPHONE (OUTPATIENT)
Dept: FAMILY MEDICINE CLINIC | Age: 75
End: 2025-05-05
Payer: MEDICARE

## 2025-05-05 NOTE — TELEPHONE ENCOUNTER
----- Message from Peri CAN sent at 3/3/2025 12:24 PM EST -----  Mammo--Spoke to Patient and she does not want to do this at this time.  She wants to push it out a couple of months.  Recall placed.

## 2025-05-13 ENCOUNTER — TELEPHONE (OUTPATIENT)
Dept: FAMILY MEDICINE CLINIC | Age: 75
End: 2025-05-13
Payer: MEDICARE

## 2025-05-13 NOTE — TELEPHONE ENCOUNTER
Caller: Mayela Reynolds    Relationship: Emergency Contact    Best call back number:     [phone]       What is the best time to reach you:  ANYTIME     Who are you requesting to speak with (clinical staff, provider,  specific staff member): CLINICAL          What was the call regarding:         MS REYNOLDS IS REQUESTING A CALL TO DISCUS THE MEDICATION LIST SHE RECEIVED FOR THE PATIENT

## 2025-05-15 ENCOUNTER — TELEPHONE (OUTPATIENT)
Dept: FAMILY MEDICINE CLINIC | Age: 75
End: 2025-05-15
Payer: MEDICARE

## 2025-05-15 DIAGNOSIS — M79.674 PAIN OF TOE OF RIGHT FOOT: Primary | ICD-10-CM

## 2025-05-15 DIAGNOSIS — M79.671 RIGHT FOOT PAIN: ICD-10-CM

## 2025-05-15 NOTE — TELEPHONE ENCOUNTER
Patient needs another referral placed because the other referral was put in for 1 visit only.  Please sign referral.

## 2025-05-15 NOTE — TELEPHONE ENCOUNTER
Caller: Luisa Padilla    Relationship: Self    Best call back number: [phone]     What is the medical concern/diagnosis: FOOT PAIN     What specialty or service is being requested: PODIATRIST     What is the provider, practice or medical service name: [facility]    What is the office location: [facility] YANCY WHALEY [address] [place] [address]     What is the office phone number: [phone]    Any additional details: PATIENT CALLED IN STATING SHE WAS SEEN BY A PODIATRIST AT THIS OFFICE AND THEY ARE STILL NEEDING A REFERRAL TO BE PUT IN, PLEASE ADVISE

## 2025-06-03 ENCOUNTER — TELEPHONE (OUTPATIENT)
Dept: FAMILY MEDICINE CLINIC | Age: 75
End: 2025-06-03
Payer: MEDICARE

## 2025-06-03 DIAGNOSIS — M17.0 PRIMARY OSTEOARTHRITIS OF BOTH KNEES: ICD-10-CM

## 2025-06-03 DIAGNOSIS — M19.041 PRIMARY OSTEOARTHRITIS OF HANDS, BILATERAL: ICD-10-CM

## 2025-06-03 DIAGNOSIS — M19.042 PRIMARY OSTEOARTHRITIS OF HANDS, BILATERAL: ICD-10-CM

## 2025-06-03 RX ORDER — DICLOFENAC SODIUM 75 MG/1
75 TABLET, DELAYED RELEASE ORAL 2 TIMES DAILY PRN
Qty: 10 TABLET | Refills: 0 | Status: SHIPPED | OUTPATIENT
Start: 2025-06-03

## 2025-06-03 NOTE — TELEPHONE ENCOUNTER
Caller: Luisa Padilla    Relationship: Self    Best call back number: [phone]    Requested Prescriptions:   ARTHRITIS MEDICATION        Pharmacy where request should be sent: [facility] [address] [place] [address] [address] [facility] - [phone]  - [phone] FX     Last office visit with prescribing clinician: 4/16/2025   Last telemedicine visit with prescribing clinician: Visit date not found   Next office visit with prescribing clinician: 7/3/2025     Additional details provided by patient: PATIENT NEEDS ABOUT 5 DAYS WORTH UNTIL IT COMES IN FROM MAIL ORDER     Does the patient have less than a 3 day supply:  [x] Yes  [] No    Would you like a call back once the refill request has been completed: [] Yes [] No    If the office needs to give you a call back, can they leave a voicemail: [] Yes [] No    Elbert Qiu   06/03/25 10:56 EDT

## 2025-07-03 ENCOUNTER — LAB (OUTPATIENT)
Dept: LAB | Facility: HOSPITAL | Age: 75
End: 2025-07-03
Payer: MEDICARE

## 2025-07-03 ENCOUNTER — OFFICE VISIT (OUTPATIENT)
Dept: FAMILY MEDICINE CLINIC | Age: 75
End: 2025-07-03
Payer: MEDICARE

## 2025-07-03 VITALS
SYSTOLIC BLOOD PRESSURE: 168 MMHG | WEIGHT: 157.2 LBS | OXYGEN SATURATION: 99 % | TEMPERATURE: 98.4 F | DIASTOLIC BLOOD PRESSURE: 69 MMHG | BODY MASS INDEX: 26.19 KG/M2 | HEART RATE: 64 BPM | HEIGHT: 65 IN

## 2025-07-03 DIAGNOSIS — M54.41 CHRONIC BILATERAL LOW BACK PAIN WITH BILATERAL SCIATICA: ICD-10-CM

## 2025-07-03 DIAGNOSIS — E78.2 MIXED HYPERLIPIDEMIA: ICD-10-CM

## 2025-07-03 DIAGNOSIS — E03.9 ACQUIRED HYPOTHYROIDISM: ICD-10-CM

## 2025-07-03 DIAGNOSIS — F41.1 GENERALIZED ANXIETY DISORDER: ICD-10-CM

## 2025-07-03 DIAGNOSIS — Z79.899 HIGH RISK MEDICATION USE: ICD-10-CM

## 2025-07-03 DIAGNOSIS — K14.6 BURNING TONGUE SYNDROME: ICD-10-CM

## 2025-07-03 DIAGNOSIS — K21.9 GASTROESOPHAGEAL REFLUX DISEASE WITHOUT ESOPHAGITIS: ICD-10-CM

## 2025-07-03 DIAGNOSIS — G89.29 CHRONIC BILATERAL LOW BACK PAIN WITH BILATERAL SCIATICA: ICD-10-CM

## 2025-07-03 DIAGNOSIS — Z12.31 SCREENING MAMMOGRAM FOR BREAST CANCER: ICD-10-CM

## 2025-07-03 DIAGNOSIS — M54.42 CHRONIC BILATERAL LOW BACK PAIN WITH BILATERAL SCIATICA: ICD-10-CM

## 2025-07-03 DIAGNOSIS — Z00.00 ANNUAL PHYSICAL EXAM: Primary | ICD-10-CM

## 2025-07-03 DIAGNOSIS — F33.1 MAJOR DEPRESSIVE DISORDER, RECURRENT EPISODE, MODERATE DEGREE: ICD-10-CM

## 2025-07-03 PROBLEM — G43.109 MIGRAINE WITH AURA AND WITHOUT STATUS MIGRAINOSUS, NOT INTRACTABLE: Status: ACTIVE | Noted: 2025-07-03

## 2025-07-03 LAB
ALBUMIN SERPL-MCNC: 4 G/DL (ref 3.5–5.2)
ALBUMIN/GLOB SERPL: 1.6 G/DL
ALP SERPL-CCNC: 105 U/L (ref 39–117)
ALT SERPL W P-5'-P-CCNC: 42 U/L (ref 1–33)
AMPHET+METHAMPHET UR QL: NEGATIVE
AMPHETAMINES UR QL: NEGATIVE
ANION GAP SERPL CALCULATED.3IONS-SCNC: 8.9 MMOL/L (ref 5–15)
AST SERPL-CCNC: 29 U/L (ref 1–32)
BARBITURATES UR QL SCN: NEGATIVE
BENZODIAZ UR QL SCN: NEGATIVE
BILIRUB SERPL-MCNC: 0.6 MG/DL (ref 0–1.2)
BUN SERPL-MCNC: 5 MG/DL (ref 8–23)
BUN/CREAT SERPL: 5.3 (ref 7–25)
BUPRENORPHINE SERPL-MCNC: NEGATIVE NG/ML
CALCIUM SPEC-SCNC: 8.9 MG/DL (ref 8.6–10.5)
CANNABINOIDS SERPL QL: NEGATIVE
CHLORIDE SERPL-SCNC: 103 MMOL/L (ref 98–107)
CHOLEST SERPL-MCNC: 200 MG/DL (ref 0–200)
CO2 SERPL-SCNC: 26.1 MMOL/L (ref 22–29)
COCAINE UR QL: NEGATIVE
CREAT SERPL-MCNC: 0.94 MG/DL (ref 0.57–1)
EGFRCR SERPLBLD CKD-EPI 2021: 64.2 ML/MIN/1.73
EXPIRATION DATE: NORMAL
GLOBULIN UR ELPH-MCNC: 2.5 GM/DL
GLUCOSE SERPL-MCNC: 71 MG/DL (ref 65–99)
HDLC SERPL-MCNC: 68 MG/DL (ref 40–60)
LDLC SERPL CALC-MCNC: 109 MG/DL (ref 0–100)
LDLC/HDLC SERPL: 1.56 {RATIO}
Lab: NORMAL
MDMA UR QL SCN: NEGATIVE
METHADONE UR QL SCN: NEGATIVE
MORPHINE/OPIATES SCREEN, URINE: NEGATIVE
OXYCODONE UR QL SCN: NEGATIVE
PCP UR QL SCN: NEGATIVE
POTASSIUM SERPL-SCNC: 4.2 MMOL/L (ref 3.5–5.2)
PROT SERPL-MCNC: 6.5 G/DL (ref 6–8.5)
SODIUM SERPL-SCNC: 138 MMOL/L (ref 136–145)
TRIGL SERPL-MCNC: 131 MG/DL (ref 0–150)
TSH SERPL DL<=0.05 MIU/L-ACNC: 1.35 UIU/ML (ref 0.27–4.2)
VLDLC SERPL-MCNC: 23 MG/DL (ref 5–40)

## 2025-07-03 PROCEDURE — 36415 COLL VENOUS BLD VENIPUNCTURE: CPT

## 2025-07-03 PROCEDURE — 80053 COMPREHEN METABOLIC PANEL: CPT

## 2025-07-03 PROCEDURE — 84443 ASSAY THYROID STIM HORMONE: CPT

## 2025-07-03 PROCEDURE — 80061 LIPID PANEL: CPT

## 2025-07-03 RX ORDER — ATORVASTATIN CALCIUM 40 MG/1
40 TABLET, FILM COATED ORAL DAILY
Qty: 90 TABLET | Refills: 1 | Status: SHIPPED | OUTPATIENT
Start: 2025-07-03

## 2025-07-03 RX ORDER — ARIPIPRAZOLE 5 MG/1
5 TABLET ORAL DAILY
Qty: 90 TABLET | Refills: 1 | Status: SHIPPED | OUTPATIENT
Start: 2025-07-03

## 2025-07-03 RX ORDER — LEVOTHYROXINE SODIUM 50 UG/1
50 TABLET ORAL DAILY
Qty: 90 TABLET | Refills: 1 | Status: SHIPPED | OUTPATIENT
Start: 2025-07-03

## 2025-07-03 RX ORDER — GABAPENTIN 100 MG/1
300 CAPSULE ORAL NIGHTLY
Qty: 270 CAPSULE | Refills: 0 | Status: SHIPPED | OUTPATIENT
Start: 2025-07-03

## 2025-07-03 RX ORDER — BUSPIRONE HYDROCHLORIDE 5 MG/1
5 TABLET ORAL 2 TIMES DAILY PRN
Qty: 180 TABLET | Refills: 1 | Status: SHIPPED | OUTPATIENT
Start: 2025-07-03

## 2025-07-03 RX ORDER — FAMOTIDINE 40 MG/1
40 TABLET, FILM COATED ORAL 2 TIMES DAILY
Qty: 180 TABLET | Refills: 1 | Status: SHIPPED | OUTPATIENT
Start: 2025-07-03

## 2025-07-03 NOTE — ASSESSMENT & PLAN NOTE
{Depression A/P Block (Optional):58008}    Orders:    ARIPiprazole (ABILIFY) 5 MG tablet; Take 1 tablet by mouth Daily.

## 2025-07-03 NOTE — ASSESSMENT & PLAN NOTE
{Hyperlipidemia A/P Block (Optional):9167709913}    Orders:    Lipid Panel; Future    Comprehensive Metabolic Panel; Future    atorvastatin (LIPITOR) 40 MG tablet; Take 1 tablet by mouth Daily.

## 2025-07-03 NOTE — ASSESSMENT & PLAN NOTE
Orders:    TSH; Future    levothyroxine (SYNTHROID, LEVOTHROID) 50 MCG tablet; Take 1 tablet by mouth Daily.

## 2025-07-03 NOTE — ASSESSMENT & PLAN NOTE
Stable on current regimen.  Symptoms are well controlled.  No adverse effects. She does require ongoing use of this controlled substance to function.  Tox screen was due today.  Prior tox screen appropriate.  JOVANA was run today.  Refills were needed today.  RTC 3 months.  Orders:    gabapentin (NEURONTIN) 100 MG capsule; Take 3 capsules by mouth Every Night.

## 2025-07-03 NOTE — PROGRESS NOTES
Subjective   The ABCs of the Annual Wellness Visit  Medicare Wellness Visit      Luisa Padilla is a 74 y.o. patient who presents for a Medicare Wellness Visit.    Patient or patient representative verbalized consent for the use of Ambient Listening during the visit with  Kari Harris MD for chart documentation. 7/3/2025  10:29 EDT    The following portions of the patient's history were reviewed and   updated as appropriate: allergies, current medications, past family history, past medical history, past social history, past surgical history, and problem list.    Compared to one year ago, the patient's physical   health is the same.  Compared to one year ago, the patient's mental   health is the same.    Recent Hospitalizations:  She was not admitted to the hospital during the last year.     Current Medical Providers:  Patient Care Team:  Kari Harris MD as PCP - General (Family Medicine)  Gerber Haddad MD as Consulting Physician (Gastroenterology)  Antony Awad MD as Surgeon (Orthopedic Surgery)    Outpatient Medications Prior to Visit   Medication Sig Dispense Refill    Cetirizine HCl (ZYRTEC ALLERGY PO) Take  by mouth Daily As Needed.      cyclobenzaprine (FLEXERIL) 10 MG tablet TAKE 1 TABLET BY MOUTH AT NIGHT AS NEEDED FOR MUSCLE SPASM 30 tablet 0    diclofenac (VOLTAREN) 75 MG EC tablet Take 1 tablet by mouth 2 (Two) Times a Day As Needed (pain). 10 tablet 0    Diclofenac Sodium (VOLTAREN) 1 % gel gel APPLY 4 GM TOPICALLY TO THE APPROPRIATE AREA AS DIRECTED 4 TIMES A DAY AS NEEDED (ARTHRITIS) FOR UP TO 30 DAYS. 100 g 2    ibuprofen (ADVIL,MOTRIN) 200 MG tablet Take 1 tablet by mouth Every 6 (Six) Hours As Needed for Mild Pain.      meclizine (ANTIVERT) 25 MG tablet Take 1 tablet by mouth 3 (Three) Times a Day As Needed for Dizziness. 30 tablet 0    omeprazole (priLOSEC) 40 MG capsule Take 1 capsule by mouth Daily. 90 capsule 1    ondansetron (ZOFRAN) 4 MG tablet Take 1 tablet by  mouth Every 8 (Eight) Hours As Needed for Nausea or Vomiting. 60 tablet 0    PARoxetine (PAXIL) 40 MG tablet TAKE ONE TABLET BY MOUTH EVERY DAY IN THE MORNING 90 tablet 1    rizatriptan (MAXALT) 5 MG tablet TAKE ONE TABLET BY MOUTH ONE TIME AS NEEDED FOR MIGRAINE FOR UP TO 1 DOSE. MAY REPEAT IN 2 HOURS IF NEEDED. 18 tablet 1    traZODone (DESYREL) 50 MG tablet Take 1 tablet by mouth At Night As Needed for Sleep. 30 tablet 5    ARIPiprazole (Abilify) 2 MG tablet Take 1 tablet by mouth Daily. 30 tablet 5    atorvastatin (LIPITOR) 40 MG tablet TAKE ONE TABLET BY MOUTH EVERY DAY 90 tablet 1    busPIRone (BUSPAR) 5 MG tablet TAKE 1 TO 2 TABLETS BY MOUTH TWICE DAILY AS NEEDED FOR ANXIETY 120 tablet 0    famotidine (PEPCID) 40 MG tablet TAKE ONE TABLET BY MOUTH TWICE A  tablet 1    gabapentin (NEURONTIN) 100 MG capsule Take 3 capsules by mouth Every Night. 270 capsule 0    levothyroxine (SYNTHROID, LEVOTHROID) 50 MCG tablet Take 1 tablet by mouth Daily. 90 tablet 1    acetaminophen (TYLENOL) 650 MG 8 hr tablet Take 1 tablet by mouth Every 8 (Eight) Hours As Needed for Mild Pain. (Patient not taking: Reported on 2/20/2025)      Iron, Ferrous Sulfate, 325 (65 Fe) MG tablet Take 325 mg by mouth Daily. (Patient not taking: Reported on 4/16/2025) 30 tablet 5    rOPINIRole (REQUIP) 0.25 MG tablet Take 1 tablet by mouth Every Night. (Patient not taking: Reported on 7/3/2025) 90 tablet 1     No facility-administered medications prior to visit.     No opioid medication identified on active medication list. I have reviewed chart for other potential  high risk medication/s and harmful drug interactions in the elderly.      Aspirin is not on active medication list.  Aspirin use is not indicated based on review of current medical condition/s. Risk of harm outweighs potential benefits.  .    Patient Active Problem List   Diagnosis    Right shoulder pain    Greater trochanteric bursitis of right hip    Primary osteoarthritis of  "right hip    Generalized anxiety disorder    Chronic cough    Gastroesophageal reflux disease without esophagitis    Essential hypertension    Mixed hyperlipidemia    Acquired hypothyroidism    Irritable bowel syndrome with both constipation and diarrhea    Chronic bilateral low back pain with bilateral sciatica    Major depressive disorder, recurrent episode, moderate degree    Osteopenia of multiple sites    Primary insomnia    Atrophic vaginitis    Primary osteoarthritis of both knees    Primary osteoarthritis of hands, bilateral    Vertigo    Family history of factor V Leiden mutation    Primary osteoarthritis of left knee    Peripheral neuropathy    Migraine with aura and without status migrainosus, not intractable     Advance Care Planning Advance Directive is not on file.  ACP discussion was held with the patient during this visit. Patient has an advance directive (not in EMR), copy requested.            Objective   Vitals:    25 1005 25 1031   BP: 148/74 168/69   BP Location: Left arm Left arm   Patient Position: Sitting Sitting   Pulse: 70 64   Temp: 98.4 °F (36.9 °C)    TempSrc: Oral    SpO2: 99%    Weight: 71.3 kg (157 lb 3.2 oz)    Height: 165.1 cm (65\")    PainSc: 0-No pain        Estimated body mass index is 26.16 kg/m² as calculated from the following:    Height as of this encounter: 165.1 cm (65\").    Weight as of this encounter: 71.3 kg (157 lb 3.2 oz).                Does the patient have evidence of cognitive impairment? No                                                                                                Health  Risk Assessment    Smoking Status:  Social History     Tobacco Use   Smoking Status Former    Current packs/day: 0.00    Average packs/day: 3.0 packs/day for 30.0 years (90.0 ttl pk-yrs)    Types: Cigarettes    Start date:     Quit date:     Years since quittin.5    Passive exposure: Never   Smokeless Tobacco Never     Alcohol Consumption:  Social " History     Substance and Sexual Activity   Alcohol Use Not Currently    Comment: socially       Fall Risk Screen  STEADI Fall Risk Assessment was completed, and patient is at HIGH risk for falls. Assessment completed on:7/3/2025    Depression Screening   Little interest or pleasure in doing things? More than half the days   Feeling down, depressed, or hopeless? Not at all   PHQ-2 Total Score 2   Trouble falling or staying asleep, or sleeping too much? Nearly every day   Feeling tired or having little energy? Not at all   Poor appetite or overeating? Nearly every day   Feeling bad about yourself - or that you are a failure or have let yourself or your family down? Not at all   Trouble concentrating on things, such as reading the newspaper or watching television? Several days   Moving or speaking so slowly that other people could have noticed? Or the opposite - being so fidgety or restless that you have been moving around a lot more than usual? Nearly every day     Thoughts that you would be better off dead, or of hurting yourself in some way? Not at all   PHQ-9 Total Score 12   If you checked off any problems, how difficult have these problems made it for you to do your work, take care of things at home, or get along with other people? Not difficult at all        Health Habits and Functional and Cognitive Screenin/3/2025    10:06 AM   Functional & Cognitive Status   Do you have difficulty preparing food and eating? No   Do you have difficulty bathing yourself, getting dressed or grooming yourself? No   Do you have difficulty using the toilet? No   Do you have difficulty moving around from place to place? No   Do you have trouble with steps or getting out of a bed or a chair? No   Current Diet Unhealthy Diet   Dental Exam Up to date   Eye Exam Up to date   Exercise (times per week) 0 times per week   Current Exercises Include No Regular Exercise   Do you need help using the phone?  No   Are you deaf or do  you have serious difficulty hearing?  No   Do you need help to go to places out of walking distance? No   Do you need help shopping? No   Do you need help preparing meals?  No   Do you need help with housework?  No   Do you need help with laundry? No   Do you need help taking your medications? No   Do you need help managing money? No   Do you ever drive or ride in a car without wearing a seat belt? Yes   Have you felt unusual fatigue (could be tiredness), stress, anger or loneliness in the last month? Yes   Who do you live with? Alone   If you need help, do you have trouble finding someone available to you? No   Have you been bothered in the last four weeks by sexual problems? No   Do you have difficulty concentrating, remembering or making decisions? Yes           Age-appropriate Screening Schedule:  Refer to the list below for future screening recommendations based on patient's age, sex and/or medical conditions. Orders for these recommended tests are listed in the plan section. The patient has been provided with a written plan.    Health Maintenance List  Health Maintenance   Topic Date Due    ZOSTER VACCINE (1 of 2) Never done    LIPID PANEL  05/23/2025    INFLUENZA VACCINE  09/01/2025 (Originally 7/1/2025)    COVID-19 Vaccine (4 - 2024-25 season) 10/16/2025 (Originally 9/1/2024)    MAMMOGRAM  03/01/2026    DXA SCAN  03/22/2026    ANNUAL WELLNESS VISIT  07/03/2026    COLORECTAL CANCER SCREENING  06/15/2033    TDAP/TD VACCINES (2 - Td or Tdap) 11/24/2033    HEPATITIS C SCREENING  Completed    Pneumococcal Vaccine 50+  Completed    LUNG CANCER SCREENING  Discontinued                                                                                                                                                CMS Preventative Services Quick Reference  Risk Factors Identified During Encounter  Immunizations Discussed/Encouraged: Shingrix  Dental Screening Recommended    The above risks/problems have been discussed  with the patient.  Pertinent information has been shared with the patient in the After Visit Summary.  An After Visit Summary and PPPS were made available to the patient.    Follow Up:   Next Medicare Wellness visit to be scheduled in 1 year.         Additional E&M Note during same encounter follows:  Patient has additional, significant, and separately identifiable condition(s)/problem(s) that require work above and beyond the Medicare Wellness Visit     Chief Complaint  Medicare Wellness-subsequent    Subjective   HPI  Luisa is also being seen today for additional medical problem/s.    History of Present Illness  The patient is a 74-year-old female who presents for evaluation of burning tongue, depression, anxiety, grief response, chronic low back pain, migraines, hypothyroidism, hyperlipidemia, and GERD.    She has been experiencing a burning sensation in her tongue for several years, which has intensified over the past few months. This discomfort prevents her from consuming spicy or acidic foods, and even ice cream causes irritation.    She is on Abilify, buspirone, paroxetine, and trazodone for depression, anxiety, and grief response. Abilify was started at her last visit on 06/01/2025. She unfortunately lost her  earlier this year. She is still waking up multiple times in the night, although she can get to bed fairly easily. She does not take her trazodone because it is too sedating.    She is on gabapentin for chronic low back pain. She follows with [facility] as well and they are providing a compounded cream for her. She is prescribed 3 tablets of 100 mg nightly but has only been taking 2 tablets of 100 mg nightly instead.    She is on rizatriptan for migraines.    She is on levothyroxine for hypothyroidism.    She is on atorvastatin for hyperlipidemia.    She is on omeprazole and famotidine for GERD. Her stomach has been doing well with no diarrhea or constipation, no nausea or  "vomiting.    She is due for a mammogram, which she plans to switch to [place] to make it easier to complete. She will be due for a bone density scan next year. Her last colonoscopy looked good, and she will not likely need another one. She is up to date on her Prevnar 20 and Tdap vaccinations. She is not receiving COVID shots. She qualifies for a shingles shot and can get that at the pharmacy if she wishes. She is due for lab work today, including a cholesterol panel and TSH.         Review of Systems   Constitutional:  Positive for fatigue. Negative for chills and fever.   HENT:  Negative for congestion, hearing loss and rhinorrhea.         Burning sensation in tongue   Eyes:  Negative for pain and visual disturbance.   Respiratory:  Negative for cough and shortness of breath.    Cardiovascular:  Negative for chest pain and palpitations.   Gastrointestinal:  Negative for abdominal pain, constipation, diarrhea, nausea and vomiting.   Genitourinary:  Negative for difficulty urinating and dysuria.   Musculoskeletal:  Positive for arthralgias and myalgias.   Neurological:  Negative for weakness and numbness.   Psychiatric/Behavioral:  Positive for dysphoric mood and sleep disturbance. Negative for suicidal ideas. The patient is nervous/anxious.               Objective   Vital Signs:  /69 (BP Location: Left arm, Patient Position: Sitting)   Pulse 64   Temp 98.4 °F (36.9 °C) (Oral)   Ht 165.1 cm (65\")   Wt 71.3 kg (157 lb 3.2 oz)   SpO2 99%   BMI 26.16 kg/m²   Physical Exam  Vitals reviewed.   Constitutional:       General: She is not in acute distress.     Appearance: Normal appearance. She is well-developed.   HENT:      Head: Normocephalic and atraumatic.      Right Ear: External ear normal.      Left Ear: External ear normal.      Mouth/Throat:      Mouth: Mucous membranes are moist.   Eyes:      Extraocular Movements: Extraocular movements intact.      Conjunctiva/sclera: Conjunctivae normal.      " Pupils: Pupils are equal, round, and reactive to light.   Cardiovascular:      Rate and Rhythm: Normal rate and regular rhythm.      Heart sounds: No murmur heard.  Pulmonary:      Effort: Pulmonary effort is normal.      Breath sounds: Normal breath sounds. No wheezing, rhonchi or rales.   Abdominal:      General: Bowel sounds are normal. There is no distension.      Palpations: Abdomen is soft.      Tenderness: There is no abdominal tenderness.   Musculoskeletal:         General: Normal range of motion.   Skin:     General: Skin is warm and dry.   Neurological:      Mental Status: She is alert and oriented to person, place, and time.      Deep Tendon Reflexes: Reflexes normal.   Psychiatric:         Mood and Affect: Mood and affect normal.         Behavior: Behavior normal.         Thought Content: Thought content normal.         Judgment: Judgment normal.       Lab Results   Component Value Date    GLUCOSE 96 05/23/2024    BUN 13 05/23/2024    CREATININE 1.19 (H) 05/23/2024    EGFR 48.7 (L) 05/23/2024    BCR 10.9 05/23/2024    K 4.1 05/23/2024    CO2 28.0 05/23/2024    CALCIUM 9.3 05/23/2024    ALBUMIN 4.4 05/23/2024    BILITOT 0.2 05/23/2024    AST 21 05/23/2024    ALT 18 05/23/2024       Lab Results   Component Value Date    CHOL 159 05/23/2024    CHLPL 165 01/12/2022    TRIG 165 (H) 05/23/2024    HDL 44 05/23/2024    LDL 87 05/23/2024       Lab Results   Component Value Date    WBC 5.23 05/23/2024    HGB 11.0 (L) 05/23/2024    HCT 36.7 05/23/2024    MCV 92.2 05/23/2024     05/23/2024                  Assessment and Plan      Annual physical exam  She is UTD on colonoscopy, last done 6/2023 and this showed diverticulosis and hemorrhoids.  Ten year repeat recommended.  Pap smears are no longer indicated by age and history; s/p hysterectomy.  She is due for mammogram, last done 3/2024 and this was normal; ordered.  She is UTD on DEXA, last done 3/2024 and this was normal.  She is UTD on Cdoskve53 (9/2022)  and Tdap (11/2023).  She is due for COVID, Shingrix.  COVID declined.  Shingrix can be done at the pharmacy.  Flu shot is not currently indicated.  She is due for routine labs including lipid panel and TSH; ordered.         Chronic bilateral low back pain with bilateral sciatica  Stable on current regimen.  Symptoms are well controlled.  No adverse effects. She does require ongoing use of this controlled substance to function.  Tox screen was due today.  Prior tox screen appropriate.  JOVANA was run today.  Refills were needed today.  RTC 3 months.  Orders:    gabapentin (NEURONTIN) 100 MG capsule; Take 3 capsules by mouth Every Night.    High risk medication use    Orders:    POC Medline 12 Panel Urine Drug Screen    Mixed hyperlipidemia       Orders:    Lipid Panel; Future    Comprehensive Metabolic Panel; Future    atorvastatin (LIPITOR) 40 MG tablet; Take 1 tablet by mouth Daily.    Acquired hypothyroidism    Orders:    TSH; Future    levothyroxine (SYNTHROID, LEVOTHROID) 50 MCG tablet; Take 1 tablet by mouth Daily.    Major depressive disorder, recurrent episode, moderate degree      Orders:    ARIPiprazole (ABILIFY) 5 MG tablet; Take 1 tablet by mouth Daily.    Generalized anxiety disorder      Orders:    busPIRone (BUSPAR) 5 MG tablet; Take 1 tablet by mouth 2 (Two) Times a Day As Needed (anxiety).    ARIPiprazole (ABILIFY) 5 MG tablet; Take 1 tablet by mouth Daily.    Gastroesophageal reflux disease without esophagitis    Orders:    famotidine (PEPCID) 40 MG tablet; Take 1 tablet by mouth 2 (Two) Times a Day.    Burning tongue syndrome         Screening mammogram for breast cancer    Orders:    Mammo Screening Digital Tomosynthesis Bilateral With CAD; Future       Assessment & Plan  Burning tongue  - Symptoms present for years, worsened over the last few months  - Unable to eat anything spicy or acidic; even ice cream causes problems  - Anticipated improvement in symptoms as emotional distress is better  managed  - Gabapentin dosage adjusted to 100 mg in the morning and 200 mg at night, provided it does not induce excessive sleepiness    Depression, anxiety, and grief response  - On Abilify, buspirone, paroxetine, and trazodone  - Abilify started at last visit on 06/01/2025; partial symptom control observed  - Still waking up multiple times in the night, although can get to bed fairly easily; does not take trazodone due to excessive sedation  - Abilify dosage increased from 2 mg to 5 mg today    Chronic low back pain  - On gabapentin and follows with [facility] for a compounded cream  - Currently taking 2 tablets of 100 mg nightly instead of the prescribed 3 tablets of 100 mg nightly  - Gabapentin dosage adjusted to 100 mg in the morning and 200 mg at night to see if this better controls the burning tongue symptoms  - Monitor for sedation  - Refills for gabapentin provided    Migraines  - On rizatriptan    Hypothyroidism  - On levothyroxine  - Refill for levothyroxine provided    Hyperlipidemia  - On atorvastatin  - Refill for atorvastatin provided    Gastroesophageal reflux disease (GERD)  - On omeprazole and famotidine  - Stomach has been doing well with no diarrhea or constipation, no nausea or vomiting  - Refill for famotidine provided    Health maintenance  - Due for a mammogram, which will be switched to [place] to make it easier to complete  - Due for a bone density scan next year  - Last colonoscopy looked good; likely will not need another one  - Up to date on Prevnar 20 and Tdap vaccines  - Not doing COVID-19 shots; qualifies for a shingles shot and can get that at the pharmacy if she wishes  - Due for lab work today, including a cholesterol panel and TSH         Follow Up   Return in about 3 months (around 10/3/2025) for Recheck.  Patient was given instructions and counseling regarding her condition or for health maintenance advice. Please see specific information pulled into the AVS if  appropriate.

## 2025-07-03 NOTE — ASSESSMENT & PLAN NOTE
{Psychiatric illness (Optional):19649}    Orders:    busPIRone (BUSPAR) 5 MG tablet; Take 1 tablet by mouth 2 (Two) Times a Day As Needed (anxiety).    ARIPiprazole (ABILIFY) 5 MG tablet; Take 1 tablet by mouth Daily.

## 2025-07-10 ENCOUNTER — TELEPHONE (OUTPATIENT)
Dept: FAMILY MEDICINE CLINIC | Age: 75
End: 2025-07-10
Payer: MEDICARE

## 2025-07-10 DIAGNOSIS — M17.0 PRIMARY OSTEOARTHRITIS OF BOTH KNEES: ICD-10-CM

## 2025-07-10 DIAGNOSIS — M19.042 PRIMARY OSTEOARTHRITIS OF HANDS, BILATERAL: ICD-10-CM

## 2025-07-10 DIAGNOSIS — M19.041 PRIMARY OSTEOARTHRITIS OF HANDS, BILATERAL: ICD-10-CM

## 2025-07-10 RX ORDER — DICLOFENAC SODIUM 75 MG/1
75 TABLET, DELAYED RELEASE ORAL 2 TIMES DAILY PRN
Qty: 180 TABLET | Refills: 0 | Status: SHIPPED | OUTPATIENT
Start: 2025-07-10

## 2025-07-10 RX ORDER — DICLOFENAC SODIUM 75 MG/1
75 TABLET, DELAYED RELEASE ORAL 2 TIMES DAILY PRN
Qty: 14 TABLET | Refills: 0 | Status: SHIPPED | OUTPATIENT
Start: 2025-07-10

## 2025-07-10 NOTE — TELEPHONE ENCOUNTER
Caller: Luisa Padilla    Relationship: Self    Best call back number: [phone]     What medication are you requesting: DICLOFENAC - 75 - 2 WEEK SUPPLY    If a prescription is needed, what is your preferred pharmacy and phone number: [facility] [address] [place], [place] - [address] [facility] - [phone]  - [phone] FX     Additional notes:    PATIENT STATES THEY NEED ENOUGH TO HOLD THEM OVER UNTIL THE MAIL ORDER CAN GET THEM THEIR FULL PRESCRIPTION.

## 2025-07-10 NOTE — TELEPHONE ENCOUNTER
Caller: Luisa Padilla    Relationship: Self    Best call back number: [phone]     Requested Prescriptions:   Requested Prescriptions     Pending Prescriptions Disp Refills    diclofenac (VOLTAREN) 75 MG EC tablet 10 tablet 0     Sig: Take 1 tablet by mouth 2 (Two) Times a Day As Needed (pain).        [facility], [address] [phone] [facility] [phone] FX     Last office visit with prescribing clinician: 7/3/2025   Last telemedicine visit with prescribing clinician: Visit date not found   Next office visit with prescribing clinician: 10/3/2025     Does the patient have less than a 3 day supply:  [] Yes  [x] No    Elbert Sanders Rep   07/10/25 10:14 EDT

## 2025-07-11 ENCOUNTER — TELEPHONE (OUTPATIENT)
Dept: FAMILY MEDICINE CLINIC | Age: 75
End: 2025-07-11
Payer: MEDICARE

## 2025-07-11 DIAGNOSIS — K14.6 BURNING TONGUE SYNDROME: Primary | ICD-10-CM

## 2025-07-11 RX ORDER — AMITRIPTYLINE HYDROCHLORIDE 10 MG/1
10 TABLET ORAL EVERY MORNING
Qty: 30 TABLET | Refills: 2 | Status: SHIPPED | OUTPATIENT
Start: 2025-07-11

## 2025-07-11 NOTE — TELEPHONE ENCOUNTER
Spoke to Patient and she said yes the morning dose of 100mg was too much, the night time dose was fine.  She will try the amitriptyline.

## 2025-07-11 NOTE — TELEPHONE ENCOUNTER
Caller: Luisa Padilla    Relationship: Self    Best call back number: [phone]     Which medication are you concerned about: GABAPENTIN     Who prescribed you this medication:      What are your concerns: PATIENT STATED THAT SHE NEEDS SOMETHING DIFFERENT. THIS MEDICATION IS TOO MUCH FOR HER BODY. SHE WANTS TO TRY SOMETHING DIFFERENT

## 2025-07-11 NOTE — TELEPHONE ENCOUNTER
Does she mean that just taking the extra tab of 100 mg is too much?  The 200 mg at bedtime is still okay, correct?  If so, I will try starting her on a low dose of amitriptyline 10 mg in the morning to try for better control of her sx.  Thanks, CHRISTEN

## 2025-07-15 DIAGNOSIS — G89.29 CHRONIC BILATERAL LOW BACK PAIN WITH BILATERAL SCIATICA: ICD-10-CM

## 2025-07-15 DIAGNOSIS — M54.41 CHRONIC BILATERAL LOW BACK PAIN WITH BILATERAL SCIATICA: ICD-10-CM

## 2025-07-15 DIAGNOSIS — M54.42 CHRONIC BILATERAL LOW BACK PAIN WITH BILATERAL SCIATICA: ICD-10-CM

## 2025-07-15 RX ORDER — ONDANSETRON 4 MG/1
4 TABLET, FILM COATED ORAL EVERY 8 HOURS PRN
Qty: 60 TABLET | Refills: 0 | Status: SHIPPED | OUTPATIENT
Start: 2025-07-15

## 2025-07-15 RX ORDER — CYCLOBENZAPRINE HCL 10 MG
10 TABLET ORAL NIGHTLY PRN
Qty: 30 TABLET | Refills: 0 | Status: SHIPPED | OUTPATIENT
Start: 2025-07-15

## 2025-07-15 NOTE — TELEPHONE ENCOUNTER
Caller: Luisa Padilla    Relationship: Self    Best call back number: [phone]     Requested Prescriptions:   Requested Prescriptions     Pending Prescriptions Disp Refills    cyclobenzaprine (FLEXERIL) 10 MG tablet 30 tablet 0     Sig: Take 1 tablet by mouth At Night As Needed for Muscle Spasms.    ondansetron (ZOFRAN) 4 MG tablet 60 tablet 0     Sig: Take 1 tablet by mouth Every 8 (Eight) Hours As Needed for Nausea or Vomiting.        Pharmacy where request should be sent: [place] [address] [address] [facility] [phone] [facility] [phone] FX     Last office visit with prescribing clinician: 7/3/2025   Last telemedicine visit with prescribing clinician: Visit date not found   Next office visit with prescribing clinician: 10/3/2025     Additional details provided by patient:     OUT OF MEDICATION    Does the patient have less than a 3 day supply:  [x] Yes  [] No    Would you like a call back once the refill request has been completed: [] Yes [] No    If the office needs to give you a call back, can they leave a voicemail: [] Yes [] No    Elbert Hart Rep   07/15/25 09:09 EDT

## 2025-08-04 ENCOUNTER — TELEPHONE (OUTPATIENT)
Dept: FAMILY MEDICINE CLINIC | Age: 75
End: 2025-08-04
Payer: MEDICARE

## 2025-08-11 ENCOUNTER — LAB (OUTPATIENT)
Dept: LAB | Facility: HOSPITAL | Age: 75
End: 2025-08-11
Payer: MEDICARE

## 2025-08-11 ENCOUNTER — OFFICE VISIT (OUTPATIENT)
Dept: FAMILY MEDICINE CLINIC | Age: 75
End: 2025-08-11
Payer: MEDICARE

## 2025-08-11 VITALS
BODY MASS INDEX: 26.76 KG/M2 | DIASTOLIC BLOOD PRESSURE: 77 MMHG | SYSTOLIC BLOOD PRESSURE: 141 MMHG | WEIGHT: 160.6 LBS | TEMPERATURE: 98.1 F | HEART RATE: 81 BPM | OXYGEN SATURATION: 99 % | HEIGHT: 65 IN

## 2025-08-11 DIAGNOSIS — K14.6 BURNING TONGUE SYNDROME: ICD-10-CM

## 2025-08-11 DIAGNOSIS — R42 VERTIGO: Primary | ICD-10-CM

## 2025-08-11 DIAGNOSIS — R42 VERTIGO: ICD-10-CM

## 2025-08-11 LAB
BACTERIA UR QL AUTO: ABNORMAL /HPF
BILIRUB UR QL STRIP: NEGATIVE
CLARITY UR: ABNORMAL
COLOR UR: YELLOW
GLUCOSE UR STRIP-MCNC: NEGATIVE MG/DL
HGB UR QL STRIP.AUTO: NEGATIVE
KETONES UR QL STRIP: NEGATIVE
LEUKOCYTE ESTERASE UR QL STRIP.AUTO: ABNORMAL
MAGNESIUM SERPL-MCNC: 2.2 MG/DL (ref 1.6–2.4)
NITRITE UR QL STRIP: NEGATIVE
PH UR STRIP.AUTO: 7 [PH] (ref 5–8)
PROT UR QL STRIP: NEGATIVE
RBC # UR STRIP: ABNORMAL /HPF
REF LAB TEST METHOD: ABNORMAL
SP GR UR STRIP: 1.01 (ref 1–1.03)
SQUAMOUS #/AREA URNS HPF: ABNORMAL /HPF
UROBILINOGEN UR QL STRIP: ABNORMAL
WBC # UR STRIP: ABNORMAL /HPF

## 2025-08-11 PROCEDURE — 36415 COLL VENOUS BLD VENIPUNCTURE: CPT

## 2025-08-11 PROCEDURE — 1126F AMNT PAIN NOTED NONE PRSNT: CPT | Performed by: NURSE PRACTITIONER

## 2025-08-11 PROCEDURE — 87077 CULTURE AEROBIC IDENTIFY: CPT

## 2025-08-11 PROCEDURE — 87086 URINE CULTURE/COLONY COUNT: CPT

## 2025-08-11 PROCEDURE — 1159F MED LIST DOCD IN RCRD: CPT | Performed by: NURSE PRACTITIONER

## 2025-08-11 PROCEDURE — 87186 SC STD MICRODIL/AGAR DIL: CPT

## 2025-08-11 PROCEDURE — 1160F RVW MEDS BY RX/DR IN RCRD: CPT | Performed by: NURSE PRACTITIONER

## 2025-08-11 PROCEDURE — 99214 OFFICE O/P EST MOD 30 MIN: CPT | Performed by: NURSE PRACTITIONER

## 2025-08-11 PROCEDURE — 83735 ASSAY OF MAGNESIUM: CPT

## 2025-08-11 PROCEDURE — 3078F DIAST BP <80 MM HG: CPT | Performed by: NURSE PRACTITIONER

## 2025-08-11 PROCEDURE — 81001 URINALYSIS AUTO W/SCOPE: CPT

## 2025-08-11 PROCEDURE — 3077F SYST BP >= 140 MM HG: CPT | Performed by: NURSE PRACTITIONER

## 2025-08-11 RX ORDER — PANTOPRAZOLE SODIUM 40 MG/1
40 TABLET, DELAYED RELEASE ORAL DAILY
COMMUNITY
Start: 2025-07-06

## 2025-08-13 LAB — BACTERIA SPEC AEROBE CULT: ABNORMAL

## 2025-08-28 ENCOUNTER — HOSPITAL ENCOUNTER (OUTPATIENT)
Dept: MAMMOGRAPHY | Facility: HOSPITAL | Age: 75
Discharge: HOME | End: 2025-08-28
Admitting: FAMILY MEDICINE
Payer: MEDICARE

## 2025-08-28 DIAGNOSIS — Z12.31 SCREENING MAMMOGRAM FOR BREAST CANCER: ICD-10-CM

## 2025-08-28 PROCEDURE — 77067 SCR MAMMO BI INCL CAD: CPT

## 2025-08-28 PROCEDURE — 77063 BREAST TOMOSYNTHESIS BI: CPT

## (undated) DEVICE — TRAP FLD MINIVAC MEGADYNE 100ML

## (undated) DEVICE — RECIPROCATING BLADE HEAVY DUTY LONG, OFFSET  (77.6 X 0.77 X 11.2MM)

## (undated) DEVICE — SUT MNCRYL 3/0 PS2 18IN MCP497G

## (undated) DEVICE — APPL CHLORAPREP HI/LITE 26ML ORNG

## (undated) DEVICE — GLV SURG PREMIERPRO ORTHO LTX PF SZ7.5 BRN

## (undated) DEVICE — BIT DRL RNGLC QC 3.2X20MM

## (undated) DEVICE — ADHS SKIN DERMABOND TOP ADVANCED

## (undated) DEVICE — DRSNG WND GZ PAD BORDERED 4X8IN STRL

## (undated) DEVICE — PK HIP TOTL 40

## (undated) DEVICE — ANTIBACTERIAL UNDYED BRAIDED (POLYGLACTIN 910), SYNTHETIC ABSORBABLE SUTURE: Brand: COATED VICRYL

## (undated) DEVICE — DRAPE,REIN 53X77,STERILE: Brand: MEDLINE

## (undated) DEVICE — 3M™ IOBAN™ 2 ANTIMICROBIAL INCISE DRAPE 6648EZ: Brand: IOBAN™ 2

## (undated) DEVICE — SYR LUERLOK 30CC

## (undated) DEVICE — PENCL E/S ULTRAVAC TELESCP NOSE HOLSTR 10FT

## (undated) DEVICE — GLV SURG BIOGEL LTX PF 7 1/2

## (undated) DEVICE — DECANT BG O JET

## (undated) DEVICE — HANDPIECE SET WITH COAXIAL HIGH FLOW TIP AND SUCTION TUBE: Brand: INTERPULSE

## (undated) DEVICE — NEEDLE, QUINCKE, 20GX3.5": Brand: MEDLINE

## (undated) DEVICE — HEWSON SUTURE RETRIEVER: Brand: HEWSON SUTURE RETRIEVER